# Patient Record
Sex: MALE | Race: WHITE | NOT HISPANIC OR LATINO | Employment: FULL TIME | URBAN - METROPOLITAN AREA
[De-identification: names, ages, dates, MRNs, and addresses within clinical notes are randomized per-mention and may not be internally consistent; named-entity substitution may affect disease eponyms.]

---

## 2017-03-02 ENCOUNTER — TELEPHONE (OUTPATIENT)
Dept: FAMILY MEDICINE | Facility: CLINIC | Age: 52
End: 2017-03-02

## 2017-03-02 NOTE — TELEPHONE ENCOUNTER
Call Regarding Preventive Health Screening Colonoscopy    Attempt 1    Message     Comments: Per patient states he doesn't have the money for it yet, will call back      Outreach   Yanique Velazquez

## 2017-11-01 ENCOUNTER — TELEPHONE (OUTPATIENT)
Dept: FAMILY MEDICINE | Facility: CLINIC | Age: 52
End: 2017-11-01

## 2017-11-01 DIAGNOSIS — F41.1 GAD (GENERALIZED ANXIETY DISORDER): ICD-10-CM

## 2017-11-01 NOTE — TELEPHONE ENCOUNTER
Routing refill request to provider for review/approval because:  Felipa given x1 and patient did not follow up, please advise  A break in medication  Patient needs to be seen because it has been more than 1 year since last office visit.

## 2017-11-02 RX ORDER — ESCITALOPRAM OXALATE 10 MG/1
TABLET ORAL
Qty: 30 TABLET | Refills: 0 | Status: SHIPPED | OUTPATIENT
Start: 2017-11-02 | End: 2017-11-30

## 2017-11-30 ENCOUNTER — OFFICE VISIT (OUTPATIENT)
Dept: FAMILY MEDICINE | Facility: CLINIC | Age: 52
End: 2017-11-30
Payer: COMMERCIAL

## 2017-11-30 VITALS
RESPIRATION RATE: 16 BRPM | TEMPERATURE: 98 F | WEIGHT: 224 LBS | SYSTOLIC BLOOD PRESSURE: 127 MMHG | OXYGEN SATURATION: 96 % | HEART RATE: 74 BPM | HEIGHT: 70 IN | BODY MASS INDEX: 32.07 KG/M2 | DIASTOLIC BLOOD PRESSURE: 78 MMHG

## 2017-11-30 DIAGNOSIS — Z00.01 ENCOUNTER FOR ROUTINE ADULT MEDICAL EXAM WITH ABNORMAL FINDINGS: Primary | ICD-10-CM

## 2017-11-30 DIAGNOSIS — Z12.11 SPECIAL SCREENING FOR MALIGNANT NEOPLASMS, COLON: ICD-10-CM

## 2017-11-30 DIAGNOSIS — F41.1 GAD (GENERALIZED ANXIETY DISORDER): ICD-10-CM

## 2017-11-30 DIAGNOSIS — E78.5 HYPERLIPIDEMIA LDL GOAL <160: ICD-10-CM

## 2017-11-30 DIAGNOSIS — Z12.5 SPECIAL SCREENING FOR MALIGNANT NEOPLASM OF PROSTATE: ICD-10-CM

## 2017-11-30 LAB
CHOLEST SERPL-MCNC: 231 MG/DL
HDLC SERPL-MCNC: 52 MG/DL
LDLC SERPL CALC-MCNC: 146 MG/DL
NONHDLC SERPL-MCNC: 179 MG/DL
TRIGL SERPL-MCNC: 164 MG/DL

## 2017-11-30 PROCEDURE — G0103 PSA SCREENING: HCPCS | Performed by: PHYSICIAN ASSISTANT

## 2017-11-30 PROCEDURE — 99396 PREV VISIT EST AGE 40-64: CPT | Performed by: PHYSICIAN ASSISTANT

## 2017-11-30 PROCEDURE — 36415 COLL VENOUS BLD VENIPUNCTURE: CPT | Performed by: PHYSICIAN ASSISTANT

## 2017-11-30 PROCEDURE — 80061 LIPID PANEL: CPT | Performed by: PHYSICIAN ASSISTANT

## 2017-11-30 RX ORDER — ESCITALOPRAM OXALATE 10 MG/1
10 TABLET ORAL DAILY
Qty: 90 TABLET | Refills: 1 | Status: SHIPPED | OUTPATIENT
Start: 2017-11-30 | End: 2018-06-11

## 2017-11-30 RX ORDER — ATORVASTATIN CALCIUM 10 MG/1
10 TABLET, FILM COATED ORAL DAILY
Qty: 90 TABLET | Refills: 1 | Status: CANCELLED | OUTPATIENT
Start: 2017-11-30

## 2017-11-30 NOTE — LETTER
December 19, 2017      Harjit Medina  [address]  [facility] [address]        Dear ,    We are writing to inform you of your test results.    Your recent psa came back nice and normal. Your cholesterol numbers are still elevated. As discussed at your appt , start taking low dose lipitor. Also , work on a Lower fat, higher fiber diet and consistent exercise. I'd like your cholesterol numbers rechecked in 2-3 mos.    Resulted Orders   Lipid panel reflex to direct LDL Fasting   Result Value Ref Range    Cholesterol 231 (H) <200 mg/dL      Comment:      Desirable:       <200 mg/dl    Triglycerides 164 (H) <150 mg/dL      Comment:      Borderline high:  150-199 mg/dl  High:             200-499 mg/dl  Very high:       >499 mg/dl  Fasting specimen      HDL Cholesterol 52 >39 mg/dL    LDL Cholesterol Calculated 146 (H) <100 mg/dL      Comment:      Above desirable:  100-129 mg/dl  Borderline High:  130-159 mg/dL  High:             160-189 mg/dL  Very high:       >189 mg/dl      Non HDL Cholesterol 179 (H) <130 mg/dL      Comment:      Above Desirable:  130-159 mg/dl  Borderline high:  160-189 mg/dl  High:             190-219 mg/dl  Very high:       >219 mg/dl     PSA, screen   Result Value Ref Range    PSA 0.88 0 - 4 ug/L      Comment:      Assay Method:  Chemiluminescence using Siemens Vista analyzer       If you have any questions or concerns, please call the clinic at [phone].       Sincerely,    Piter Maynard PA-C/char

## 2017-11-30 NOTE — PROGRESS NOTES
SUBJECTIVE:   CC: Harjit Medina is an 52 year old male who presents for preventative health visit.     Healthy Habits:    Do you get at least three servings of calcium containing foods daily (dairy, green leafy vegetables, etc.)? yes    Amount of exercise or daily activities, outside of work: 6 day(s) per week    Problems taking medications regularly Yes never picked up lipid medication    Medication side effects: No    Have you had an eye exam in the past two years? yes    Do you see a dentist twice per year? yes    Do you have sleep apnea, excessive snoring or daytime drowsiness?no          Hyperlipidemia Follow-Up      Rate your low fat/cholesterol diet?: good    Taking statin?  Never picked up lipid medication last year    Other lipid medications/supplements?:  none      Never took atorvastatin. Will see what his cholesterol looks like today. He's been watching his diet and eating healthier. Working out routinely.     Today's PHQ-2 Score: PHQ-2 ( 1999 [employer]) 11/30/2017 11/5/2015   Q1: Little interest or pleasure in doing things 1 0   Q2: Feeling down, depressed or hopeless 1 0   PHQ-2 Score 2 0         Abuse: Current or Past(Physical, Sexual or Emotional)- No  Do you feel safe in your environment - Yes  Social History   Substance Use Topics     Smoking status: Current Every Day Smoker     Types: Cigarettes     Smokeless tobacco: Not on file     Alcohol use Yes     The patient does not drink >3 drinks per day nor >7 drinks per week.    Last PSA:   PSA   Date Value Ref Range Status   11/14/2016 0.68 0 - 4 ug/L Final     Comment:     Assay Method:  Chemiluminescence using Siemens Vista analyzer       Reviewed orders with patient. Reviewed health maintenance and updated orders accordingly - Yes      Reviewed and updated as needed this visit by clinical staff  Tobacco  Allergies  Meds         Reviewed and updated as needed this visit by Provider              ROS:  C: NEGATIVE for fever, chills, change in  "weight  I: NEGATIVE for worrisome rashes, moles or lesions  E: NEGATIVE for vision changes or irritation  ENT: NEGATIVE for ear, mouth and throat problems  R: NEGATIVE for significant cough or SOB  CV: NEGATIVE for chest pain, palpitations or peripheral edema  GI: NEGATIVE for nausea, abdominal pain, heartburn, or change in bowel habits   male: negative for dysuria, hematuria, decreased urinary stream, erectile dysfunction, urethral discharge  M: NEGATIVE for significant arthralgias or myalgia  N: NEGATIVE for weakness, dizziness or paresthesias  P: NEGATIVE for changes in mood or affect    OBJECTIVE:   /78  Pulse 74  Temp 98  F (36.7  C) (Tympanic)  Resp 16  Ht 5' 10\" (1.778 m)  Wt 224 lb (101.6 kg)  SpO2 96%  BMI 32.14 kg/m2  EXAM:  GENERAL: healthy, alert and no distress  EYES: Eyes grossly normal to inspection, PERRL and conjunctivae and sclerae normal  HENT: ear canals and TM's normal, nose and mouth without ulcers or lesions  NECK: no adenopathy, no asymmetry, masses, or scars and thyroid normal to palpation  RESP: lungs clear to auscultation - no rales, rhonchi or wheezes  CV: regular rate and rhythm, normal S1 S2, no S3 or S4, no murmur, click or rub, no peripheral edema and peripheral pulses strong  ABDOMEN: soft, nontender, no hepatosplenomegaly, no masses and bowel sounds normal  MS: no gross musculoskeletal defects noted, no edema  SKIN: no suspicious lesions or rashes  NEURO: Normal strength and tone, mentation intact and speech normal  PSYCH: mentation appears normal, affect normal/bright    ASSESSMENT/PLAN:       ICD-10-CM    1. Encounter for routine adult medical exam with abnormal findings Z00.01 Basic metabolic panel  (Ca, Cl, CO2, Creat, Gluc, K, Na, BUN)     TSH   2. Special screening for malignant neoplasms, colon Z12.11 GASTROENTEROLOGY ADULT REF PROCEDURE ONLY   3. Hyperlipidemia LDL goal <160 E78.5 Lipid panel reflex to direct LDL Fasting   4. NAYELY (generalized anxiety disorder) " "F41.1 escitalopram (LEXAPRO) 10 MG tablet   5. Special screening for malignant neoplasm of prostate Z12.5 PSA, screen       COUNSELING:  Reviewed preventive health counseling, as reflected in patient instructions       Regular exercise       Healthy diet/nutrition         reports that he has been smoking Cigarettes.  He does not have any smokeless tobacco history on file.  Tobacco Cessation Action Plan: pt to work on quitting again  Estimated body mass index is 32.14 kg/(m^2) as calculated from the following:    Height as of this encounter: 5' 10\" (1.778 m).    Weight as of this encounter: 224 lb (101.6 kg).         Counseling Resources:  ATP IV Guidelines  Pooled Cohorts Equation Calculator  FRAX Risk Assessment  ICSI Preventive Guidelines  Dietary Guidelines for Americans, 2010  USDA's MyPlate  ASA Prophylaxis  Lung CA Screening    Piter Maynard PA-C  [facility] [place]  "

## 2017-11-30 NOTE — MR AVS SNAPSHOT
After Visit Summary   11/30/2017    Harjit Medina    MRN: [id number]           Patient Information     Date Of Birth          1965        Visit Information        Provider Department      11/30/2017 9:00 AM Piter Maynard PA-C [facility]        Today's Diagnoses     Encounter for routine adult medical exam with abnormal findings    -  1    Special screening for malignant neoplasms, colon        Hyperlipidemia LDL goal <160        NAYELY (generalized anxiety disorder)        Special screening for malignant neoplasm of prostate          Care Instructions      Preventive Health Recommendations  Male Ages 50 - 64    Yearly exam:             See your health care provider every year in order to  o   Review health changes.   o   Discuss preventive care.    o   Review your medicines if your doctor has prescribed any.     Have a cholesterol test every 5 years, or more frequently if you are at risk for high cholesterol/heart disease.     Have a diabetes test (fasting glucose) every three years. If you are at risk for diabetes, you should have this test more often.     Have a colonoscopy at age 50, or have a yearly FIT test (stool test). These exams will check for colon cancer.      Talk with your health care provider about whether or not a prostate cancer screening test (PSA) is right for you.    You should be tested each year for STDs (sexually transmitted diseases), if you re at risk.     Shots: Get a flu shot each year. Get a tetanus shot every 10 years.     Nutrition:    Eat at least 5 servings of fruits and vegetables daily.     Eat whole-grain bread, whole-wheat pasta and brown rice instead of white grains and rice.     Talk to your provider about Calcium and Vitamin D.     Lifestyle    Exercise for at least 150 minutes a week (30 minutes a day, 5 days a week). This will help you control your weight and prevent disease.     Limit alcohol to one drink per day.     No smoking.      Wear sunscreen to prevent skin cancer.     See your dentist every six months for an exam and cleaning.     See your eye doctor every 1 to 2 years.            Follow-ups after your visit        Additional Services     GASTROENTEROLOGY ADULT REF PROCEDURE ONLY       Last Lab Result: Creatinine (mg/dL)       Date                     Value                 11/14/2016               1.00             ----------  There is no height or weight on file to calculate BMI.     Needed:  No  Language:  English    Patient will be contacted to schedule procedure.     Please be aware that coverage of these services is subject to the terms and limitations of your health insurance plan.  Call member services at your health plan with any benefit or coverage questions.  Any procedures must be performed at a [facility] facility OR coordinated by your clinic's referral office.    Please bring the following with you to your appointment:    (1) Any X-Rays, CTs or MRIs which have been performed.  Contact the facility where they were done to arrange for  prior to your scheduled appointment.    (2) List of current medications   (3) This referral request   (4) Any documents/labs given to you for this referral                  Who to contact     Normal or non-critical lab and imaging results will be communicated to you by MyChart, letter or phone within 4 business days after the clinic has received the results. If you do not hear from us within 7 days, please contact the clinic through [employer] or phone. If you have a critical or abnormal lab result, we will notify you by phone as soon as possible.  Submit refill requests through [employer] or call your pharmacy and they will forward the refill request to us. Please allow 3 business days for your refill to be completed.          If you need to speak with a  for additional information , please call: [phone]             Additional Information About Your Visit    "     [employer] Information     [employer] lets you send messages to your doctor, view your test results, renew your prescriptions, schedule appointments and more. To sign up, go to [email or web address] . Click on \"Log in\" on the left side of the screen, which will take you to the Welcome page. Then click on \"Sign up Now\" on the right side of the page.     You will be asked to enter the access code listed below, as well as some personal information. Please follow the directions to create your username and password.     Your access code is: [email or web address]  Expires: 2018  9:28 AM     Your access code will  in 90 days. If you need help or a new code, please call your [facility] clinic or [phone].        Care EveryWhere ID     This is your Care EveryWhere ID. This could be used by other organizations to access your [facility] medical records  [id number]        Your Vitals Were     Pulse Temperature Respirations Height Pulse Oximetry BMI (Body Mass Index)    74 98  F (36.7  C) (Tympanic) 16 5' 10\" (1.778 m) 96% 32.14 kg/m2       Blood Pressure from Last 3 Encounters:   17 127/78   16 118/72   16 125/86    Weight from Last 3 Encounters:   17 224 lb (101.6 kg)   16 233 lb (105.7 kg)   11/05/15 228 lb (103.4 kg)              We Performed the Following     Eye Exam - HIM Scan     GASTROENTEROLOGY ADULT REF PROCEDURE ONLY     Lipid panel reflex to direct LDL Fasting     PSA, screen          Today's Medication Changes          These changes are accurate as of: 17  9:28 AM.  If you have any questions, ask your nurse or doctor.               These medicines have changed or have updated prescriptions.        Dose/Directions    escitalopram 10 MG tablet   Commonly known as:  LEXAPRO   This may have changed:    - how much to take  - how to take this  - when to take this  - additional instructions   Used for:  NAYELY (generalized anxiety disorder)   Changed by:  Piter Maynard PA-C     "    Dose:  10 mg   Take 1 tablet (10 mg) by mouth daily 1 tablet daily   Quantity:  90 tablet   Refills:  1            Where to get your medicines      These medications were sent to [id number] - [place], [place] - [address] AT [facility] [address])  [address], [place] [address]     Phone:  [phone]     escitalopram 10 MG tablet                Primary Care Provider Office Phone # Fax #    Piter Maynard PA-C [phone]       [address]  [place] [address]        Equal Access to Services     [facility]: Hadii aad ku hadasho Soomaali, waaxda luqadaha, qaybta kaalmada adeegyada, waxlaly annin hayaan adedimitrios meléndez . So [facility] [phone].    ATENCIÓN: Si habla español, tiene a villanueva disposición servicios gratuitos de asistencia lingüística. [facility] [phone].    We comply with applicable federal civil rights laws and [place] laws. We do not discriminate on the basis of race, color, national origin, age, disability, sex, sexual orientation, or gender identity.            Thank you!     Thank you for choosing [facility]  for your care. Our goal is always to provide you with excellent care. Hearing back from our patients is one way we can continue to improve our services. Please take a few minutes to complete the written survey that you may receive in the mail after your visit with us. Thank you!             Your Updated Medication List - Protect others around you: Learn how to safely use, store and throw away your medicines at www.disposemymeds.org.          This list is accurate as of: 11/30/17  9:28 AM.  Always use your most recent med list.                   Brand Name Dispense Instructions for use Diagnosis    atorvastatin 10 MG tablet    LIPITOR    90 tablet    Take 1 tablet (10 mg) by mouth daily    Hyperlipidemia LDL goal <160       escitalopram 10 MG tablet    LEXAPRO    90 tablet    Take 1 tablet (10 mg) by mouth  daily 1 tablet daily    NAYELY (generalized anxiety disorder)       order for DME     1 Device    Equipment being ordered: CPAP and all necessary supplies    BARRINGTON (obstructive sleep apnea)

## 2017-12-01 LAB — PSA SERPL-ACNC: 0.88 UG/L (ref 0–4)

## 2017-12-04 ENCOUNTER — HOSPITAL ENCOUNTER (OUTPATIENT)
Facility: AMBULATORY SURGERY CENTER | Age: 52
End: 2017-12-04
Attending: SURGERY | Admitting: SURGERY
Payer: COMMERCIAL

## 2017-12-28 VITALS — WEIGHT: 223.99 LBS | HEIGHT: 70 IN | BODY MASS INDEX: 32.07 KG/M2

## 2017-12-28 RX ORDER — LIDOCAINE 40 MG/G
CREAM TOPICAL
Status: CANCELLED | OUTPATIENT
Start: 2017-12-28

## 2018-01-04 ENCOUNTER — SURGERY (OUTPATIENT)
Age: 53
End: 2018-01-04

## 2018-01-04 ENCOUNTER — HOSPITAL ENCOUNTER (OUTPATIENT)
Facility: AMBULATORY SURGERY CENTER | Age: 53
Discharge: HOME | End: 2018-01-04
Attending: SURGERY | Admitting: SURGERY
Payer: COMMERCIAL

## 2018-01-04 VITALS
RESPIRATION RATE: 16 BRPM | OXYGEN SATURATION: 96 % | TEMPERATURE: 97.4 F | DIASTOLIC BLOOD PRESSURE: 79 MMHG | SYSTOLIC BLOOD PRESSURE: 116 MMHG

## 2018-01-04 PROCEDURE — G8907 PT DOC NO EVENTS ON DISCHARG: HCPCS

## 2018-01-04 PROCEDURE — 99152 MOD SED SAME PHYS/QHP 5/>YRS: CPT | Performed by: SURGERY

## 2018-01-04 PROCEDURE — 45380 COLONOSCOPY AND BIOPSY: CPT

## 2018-01-04 PROCEDURE — G8918 PT W/O PREOP ORDER IV AB PRO: HCPCS

## 2018-01-04 PROCEDURE — 88305 TISSUE EXAM BY PATHOLOGIST: CPT | Performed by: SURGERY

## 2018-01-04 PROCEDURE — 45380 COLONOSCOPY AND BIOPSY: CPT | Mod: PT | Performed by: SURGERY

## 2018-01-04 RX ORDER — FENTANYL CITRATE 50 UG/ML
INJECTION, SOLUTION INTRAMUSCULAR; INTRAVENOUS PRN
Status: DISCONTINUED | OUTPATIENT
Start: 2018-01-04 | End: 2018-01-04 | Stop reason: HOSPADM

## 2018-01-04 RX ORDER — LIDOCAINE 40 MG/G
CREAM TOPICAL
Status: DISCONTINUED | OUTPATIENT
Start: 2018-01-04 | End: 2018-01-05 | Stop reason: HOSPADM

## 2018-01-04 RX ADMIN — FENTANYL CITRATE 50 MCG: 50 INJECTION, SOLUTION INTRAMUSCULAR; INTRAVENOUS at 08:29

## 2018-01-04 RX ADMIN — FENTANYL CITRATE 100 MCG: 50 INJECTION, SOLUTION INTRAMUSCULAR; INTRAVENOUS at 08:26

## 2018-01-04 RX ADMIN — FENTANYL CITRATE 50 MCG: 50 INJECTION, SOLUTION INTRAMUSCULAR; INTRAVENOUS at 08:35

## 2018-01-08 LAB — COPATH REPORT: NORMAL

## 2018-01-15 LAB — COLONOSCOPY: NORMAL

## 2018-02-19 DIAGNOSIS — F41.1 GAD (GENERALIZED ANXIETY DISORDER): ICD-10-CM

## 2018-02-19 RX ORDER — ESCITALOPRAM OXALATE 10 MG/1
TABLET ORAL
Qty: 30 TABLET | Refills: 0 | OUTPATIENT
Start: 2018-02-19

## 2018-02-19 NOTE — TELEPHONE ENCOUNTER
"Requested Prescriptions   Pending Prescriptions Disp Refills     escitalopram (LEXAPRO) 10 MG tablet [Pharmacy Med Name: ESCITALOPRAM 10MG TABLETS] 30 tablet 0    Last Written Prescription Date:  11/2/17  Last Fill Quantity: 30,  # refills: 0   Last office visit: 11/30/2017 with prescribing provider:  11/30/17   Future Office Visit:   Sig: TAKE ONE-HALF TABLET BY MOUTH DAILY FOR 1 WEEK, THEN INCREASE TO 1 TABLET DAILY THEREAFTER    SSRIs Protocol Passed    2/19/2018  1:40 PM       Passed - Recent or future visit with authorizing provider    Patient had office visit in the last year or has a visit in the next 30 days with authorizing provider.  See \"Patient Info\" tab in inbasket, or \"Choose Columns\" in Meds & Orders section of the refill encounter.            Passed - Patient is age 18 or older        "

## 2018-02-20 NOTE — TELEPHONE ENCOUNTER
"Call to [facility] to discuss our records ,yes she see refill , she apologized \"not sure how that happened \"  "

## 2018-04-26 ENCOUNTER — TELEPHONE (OUTPATIENT)
Dept: FAMILY MEDICINE | Facility: CLINIC | Age: 53
End: 2018-04-26

## 2018-04-26 DIAGNOSIS — K57.32 DIVERTICULITIS OF COLON: ICD-10-CM

## 2018-04-26 RX ORDER — CIPROFLOXACIN 500 MG/1
500 TABLET, FILM COATED ORAL 2 TIMES DAILY
Qty: 28 TABLET | Refills: 0 | Status: SHIPPED | OUTPATIENT
Start: 2018-04-26 | End: 2019-05-01

## 2018-04-26 NOTE — TELEPHONE ENCOUNTER
Spoke with patient and he reports you have seen him for this in the past, (9/2016 OV)  he is asking if you would send in medication for this problem.  The symptoms of cramping and irregular bowel movement started last night.  Pharmacy pended,

## 2018-04-26 NOTE — TELEPHONE ENCOUNTER
Patient notified and voiced understanding and agreement.  Knows to be seen if symptoms change, worsen or persist.  Anel Ramirez RN

## 2018-06-11 DIAGNOSIS — E78.5 HYPERLIPIDEMIA LDL GOAL <160: Primary | ICD-10-CM

## 2018-06-11 DIAGNOSIS — F41.1 GAD (GENERALIZED ANXIETY DISORDER): ICD-10-CM

## 2018-06-11 NOTE — TELEPHONE ENCOUNTER
"Requested Prescriptions   Pending Prescriptions Disp Refills     escitalopram (LEXAPRO) 10 MG tablet [Pharmacy Med Name: ESCITALOPRAM 10MG TABLETS] 90 tablet 0    Last Written Prescription Date:  2-19-18  Last Fill Quantity: 90,  # refills: 0   Last office visit: 11/30/2017 with prescribing provider:  11-30-17   Future Office Visit:   Sig: TAKE 1 TABLET(10 MG) BY MOUTH DAILY    SSRIs Protocol Passed    6/11/2018  8:18 AM       Passed - Recent (12 mo) or future (30 days) visit within the authorizing provider's specialty    Patient had office visit in the last 12 months or has a visit in the next 30 days with authorizing provider or within the authorizing provider's specialty.  See \"Patient Info\" tab in inbasket, or \"Choose Columns\" in Meds & Orders section of the refill encounter.           Passed - Patient is age 18 or older        "

## 2018-06-11 NOTE — LETTER
[facility]  [address] [facility] [address]  Phone: [phone]        June 13, 2018      Harjit DONNA Medina                                                                                                             [address] [facility] [address]        Dear Mr. Medina,    We are concerned about your health care. We recently provided you with a medication refill. Many medications require routine follow-up with your Doctor.      At this time we ask that: You schedule a routine office visit with your physician to follow your Anxiety.    Your prescription: Has been refilled for 1 month so you may have time for the above noted follow-up.      Thank you,    Piter Maynard's Care Team

## 2018-06-12 RX ORDER — ESCITALOPRAM OXALATE 10 MG/1
TABLET ORAL
Qty: 30 TABLET | Refills: 0 | Status: SHIPPED | OUTPATIENT
Start: 2018-06-12 | End: 2018-07-11

## 2018-06-12 NOTE — TELEPHONE ENCOUNTER
Routing refill request to provider for review/approval because:  No flowsheet data found.    Also overdue for recheck fasting lipids. Call to schedule lab appt. And complete NAYELY-7    Lab Results   Component Value Date    CHOL 231 11/30/2017     Lab Results   Component Value Date    HDL 52 11/30/2017     Lab Results   Component Value Date     11/30/2017     Lab Results   Component Value Date    TRIG 164 11/30/2017     Lab Results   Component Value Date    CHOLHDLRATIO 5.4 11/05/2015

## 2018-07-11 DIAGNOSIS — F41.1 GAD (GENERALIZED ANXIETY DISORDER): ICD-10-CM

## 2018-07-11 NOTE — TELEPHONE ENCOUNTER
"Requested Prescriptions   Pending Prescriptions Disp Refills     escitalopram (LEXAPRO) 10 MG tablet [Pharmacy Med Name: ESCITALOPRAM 10MG TABLETS] 30 tablet 0    Last Written Prescription Date:  6-12-18  Last Fill Quantity: 30,  # refills: 0   Last office visit: 11/30/2017 with prescribing provider:  11-30-17   Future Office Visit:   Sig: TAKE 1 TABLET(10 MG) BY MOUTH DAILY    SSRIs Protocol Passed    7/11/2018 10:22 AM       Passed - Recent (12 mo) or future (30 days) visit within the authorizing provider's specialty    Patient had office visit in the last 12 months or has a visit in the next 30 days with authorizing provider or within the authorizing provider's specialty.  See \"Patient Info\" tab in inbasket, or \"Choose Columns\" in Meds & Orders section of the refill encounter.           Passed - Patient is age 18 or older        "

## 2018-07-11 NOTE — TELEPHONE ENCOUNTER
Routing refill request to provider for review/approval because:  Labs not current:  No isaias 7 listed, please call to get info.   No flowsheet data found.

## 2018-07-12 RX ORDER — ESCITALOPRAM OXALATE 10 MG/1
TABLET ORAL
Qty: 90 TABLET | Refills: 0 | Status: SHIPPED | OUTPATIENT
Start: 2018-07-12 | End: 2018-10-20

## 2018-07-12 ASSESSMENT — ANXIETY QUESTIONNAIRES
3. WORRYING TOO MUCH ABOUT DIFFERENT THINGS: SEVERAL DAYS
5. BEING SO RESTLESS THAT IT IS HARD TO SIT STILL: SEVERAL DAYS
7. FEELING AFRAID AS IF SOMETHING AWFUL MIGHT HAPPEN: NOT AT ALL
1. FEELING NERVOUS, ANXIOUS, OR ON EDGE: NOT AT ALL
GAD7 TOTAL SCORE: 6
2. NOT BEING ABLE TO STOP OR CONTROL WORRYING: MORE THAN HALF THE DAYS
6. BECOMING EASILY ANNOYED OR IRRITABLE: MORE THAN HALF THE DAYS

## 2018-07-12 ASSESSMENT — PATIENT HEALTH QUESTIONNAIRE - PHQ9: 5. POOR APPETITE OR OVEREATING: NOT AT ALL

## 2018-07-13 ASSESSMENT — ANXIETY QUESTIONNAIRES: GAD7 TOTAL SCORE: 6

## 2018-10-20 DIAGNOSIS — F41.1 GAD (GENERALIZED ANXIETY DISORDER): ICD-10-CM

## 2018-10-20 NOTE — LETTER
October 24, 2018       Harjit Medina                                                                                                              [address] [facility] [address]        Dear Mr. Medina,    We are concerned about your health care. We recently provided you with a medication refill. Many medications require routine follow-up with your Doctor.      At this time we ask that: You schedule a routine office visit with your physician to follow your Anxiety.    Your prescription: Has been refilled for 1 month so you may have time for the above noted follow-up.      Thank you,    Piter Maynard's Care Team

## 2018-10-22 NOTE — TELEPHONE ENCOUNTER
Routing refill request to provider for review/approval because:  Patient needs to be seen because:  Patient advised to be seen and no showed appoinment

## 2018-10-22 NOTE — TELEPHONE ENCOUNTER
"Requested Prescriptions   Pending Prescriptions Disp Refills     escitalopram (LEXAPRO) 10 MG tablet [Pharmacy Med Name: ESCITALOPRAM 10MG TABLETS] 90 tablet 0    Last Written Prescription Date:  07/26/18  Last Fill Quantity: 90,  # refills: 0   Last office visit: 11/30/2017 with prescribing provider:  TERRANCE Maynard  Future Office Visit:     Sig: TAKE 1 TABLET(10 MG) BY MOUTH DAILY    SSRIs Protocol Failed    10/20/2018 10:17 AM   NAYELY-7 SCORE 7/12/2018   Total Score 6     No flowsheet data found.      Failed - Recent (12 mo) or future (30 days) visit within the authorizing provider's specialty    Patient had office visit in the last 12 months or has a visit in the next 30 days with authorizing provider or within the authorizing provider's specialty.  See \"Patient Info\" tab in inbasket, or \"Choose Columns\" in Meds & Orders section of the refill encounter.             Passed - Patient is age 18 or older          "

## 2018-10-23 RX ORDER — ESCITALOPRAM OXALATE 10 MG/1
TABLET ORAL
Qty: 30 TABLET | Refills: 0 | Status: SHIPPED | OUTPATIENT
Start: 2018-10-23 | End: 2019-05-01

## 2019-05-01 ENCOUNTER — OFFICE VISIT (OUTPATIENT)
Dept: FAMILY MEDICINE | Facility: CLINIC | Age: 54
End: 2019-05-01
Payer: COMMERCIAL

## 2019-05-01 VITALS
HEART RATE: 68 BPM | OXYGEN SATURATION: 99 % | BODY MASS INDEX: 35.07 KG/M2 | DIASTOLIC BLOOD PRESSURE: 84 MMHG | TEMPERATURE: 97.8 F | SYSTOLIC BLOOD PRESSURE: 128 MMHG | RESPIRATION RATE: 16 BRPM | HEIGHT: 70 IN | WEIGHT: 245 LBS

## 2019-05-01 DIAGNOSIS — M76.62 ACHILLES TENDINITIS, LEFT LEG: ICD-10-CM

## 2019-05-01 DIAGNOSIS — H91.90 PERCEIVED HEARING CHANGES: ICD-10-CM

## 2019-05-01 DIAGNOSIS — F41.1 GAD (GENERALIZED ANXIETY DISORDER): ICD-10-CM

## 2019-05-01 DIAGNOSIS — Z13.1 SCREENING FOR DIABETES MELLITUS: ICD-10-CM

## 2019-05-01 DIAGNOSIS — Z00.00 ROUTINE GENERAL MEDICAL EXAMINATION AT A HEALTH CARE FACILITY: Primary | ICD-10-CM

## 2019-05-01 DIAGNOSIS — E66.01 MORBID OBESITY (H): ICD-10-CM

## 2019-05-01 DIAGNOSIS — E78.5 HYPERLIPIDEMIA LDL GOAL <160: ICD-10-CM

## 2019-05-01 DIAGNOSIS — Z12.5 SCREENING PSA (PROSTATE SPECIFIC ANTIGEN): ICD-10-CM

## 2019-05-01 LAB
ANION GAP SERPL CALCULATED.3IONS-SCNC: 9 MMOL/L (ref 3–14)
BUN SERPL-MCNC: 14 MG/DL (ref 7–30)
CALCIUM SERPL-MCNC: 9.2 MG/DL (ref 8.5–10.1)
CHLORIDE SERPL-SCNC: 103 MMOL/L (ref 94–109)
CHOLEST SERPL-MCNC: 214 MG/DL
CO2 SERPL-SCNC: 25 MMOL/L (ref 20–32)
CREAT SERPL-MCNC: 0.91 MG/DL (ref 0.66–1.25)
GFR SERPL CREATININE-BSD FRML MDRD: >90 ML/MIN/{1.73_M2}
GLUCOSE SERPL-MCNC: 88 MG/DL (ref 70–99)
HDLC SERPL-MCNC: 44 MG/DL
LDLC SERPL CALC-MCNC: 119 MG/DL
NONHDLC SERPL-MCNC: 170 MG/DL
POTASSIUM SERPL-SCNC: 4.7 MMOL/L (ref 3.4–5.3)
PSA SERPL-ACNC: 0.68 UG/L (ref 0–4)
SODIUM SERPL-SCNC: 137 MMOL/L (ref 133–144)
TRIGL SERPL-MCNC: 254 MG/DL

## 2019-05-01 PROCEDURE — 99396 PREV VISIT EST AGE 40-64: CPT | Performed by: PHYSICIAN ASSISTANT

## 2019-05-01 PROCEDURE — 80061 LIPID PANEL: CPT | Performed by: PHYSICIAN ASSISTANT

## 2019-05-01 PROCEDURE — G0103 PSA SCREENING: HCPCS | Performed by: PHYSICIAN ASSISTANT

## 2019-05-01 PROCEDURE — 36415 COLL VENOUS BLD VENIPUNCTURE: CPT | Performed by: PHYSICIAN ASSISTANT

## 2019-05-01 PROCEDURE — 80048 BASIC METABOLIC PNL TOTAL CA: CPT | Performed by: PHYSICIAN ASSISTANT

## 2019-05-01 PROCEDURE — 99213 OFFICE O/P EST LOW 20 MIN: CPT | Mod: 25 | Performed by: PHYSICIAN ASSISTANT

## 2019-05-01 RX ORDER — ESCITALOPRAM OXALATE 10 MG/1
10 TABLET ORAL DAILY
Qty: 90 TABLET | Refills: 1 | Status: SHIPPED | OUTPATIENT
Start: 2019-05-01 | End: 2019-05-01

## 2019-05-01 ASSESSMENT — MIFFLIN-ST. JEOR: SCORE: 1954.62

## 2019-05-01 NOTE — PROGRESS NOTES
SUBJECTIVE:   CC: Harjit Medina is an 53 year old male who presents for preventive health visit.     Healthy Habits:    Do you get at least three servings of calcium containing foods daily (dairy, green leafy vegetables, etc.)? yes    Amount of exercise or daily activities, outside of work: 0 day(s) per week    Problems taking medications regularly No    Medication side effects: No    Have you had an eye exam in the past two years? yes    Do you see a dentist twice per year? yes    Do you have sleep apnea, excessive snoring or daytime drowsiness?yes      Depression and Anxiety Follow-Up    Status since last visit: stable refill medication today    Other associated symptoms:None    Complicating factors:     Significant life event: No     Current substance abuse: None  Doing well.   Still noting some irritability.   Noting some hearing changes.       No flowsheet data found.  NAYELY-7 SCORE 7/12/2018   Total Score 6       PHQ-9  English  PHQ-9   Any Language  NAYELY-7  Suicide Assessment Five-step Evaluation and Treatment (SAFE-T)    Today's PHQ-2 Score:   PHQ-2 ( 1999 [employer]) 11/30/2017 11/5/2015   Q1: Little interest or pleasure in doing things 1 0   Q2: Feeling down, depressed or hopeless 1 0   PHQ-2 Score 2 0       Abuse: Current or Past(Physical, Sexual or Emotional)- No  Do you feel safe in your environment? Yes    Social History     Tobacco Use     Smoking status: Former Smoker     Packs/day: 1.00     Types: Cigarettes     Smokeless tobacco: Former User     Quit date: 9/1/2018   Substance Use Topics     Alcohol use: Yes     Comment: couple days per week     If you drink alcohol do you typically have >3 drinks per day or >7 drinks per week? No                      Last PSA:   PSA   Date Value Ref Range Status   11/30/2017 0.88 0 - 4 ug/L Final     Comment:     Assay Method:  Chemiluminescence using Siemens Vista analyzer       Reviewed orders with patient. Reviewed health maintenance and updated orders  "accordingly - Yes      Reviewed and updated as needed this visit by clinical staff  Tobacco  Allergies  Meds  Med Hx  Surg Hx  Fam Hx  Soc Hx        Reviewed and updated as needed this visit by Provider            ROS:  CONSTITUTIONAL: NEGATIVE for fever, chills, change in weight  INTEGUMENTARY/SKIN: NEGATIVE for worrisome rashes, moles or lesions  EYES: NEGATIVE for vision changes or irritation  ENT: NEGATIVE for ear, mouth and throat problems  RESP: NEGATIVE for significant cough or SOB  CV: NEGATIVE for chest pain, palpitations or peripheral edema  GI: NEGATIVE for nausea, abdominal pain, heartburn, or change in bowel habits   male: negative for dysuria, hematuria, decreased urinary stream, erectile dysfunction, urethral discharge  MUSCULOSKELETAL: NEGATIVE for significant arthralgias or myalgia  NEURO: NEGATIVE for weakness, dizziness or paresthesias  PSYCHIATRIC: NEGATIVE for changes in mood or affect    OBJECTIVE:   /84   Pulse 68   Temp 97.8  F (36.6  C) (Tympanic)   Resp 16   Ht 1.765 m (5' 9.5\")   Wt 111.1 kg (245 lb)   SpO2 99%   BMI 35.66 kg/m    EXAM:  GENERAL: healthy, alert and no distress  EYES: Eyes grossly normal to inspection, PERRL and conjunctivae and sclerae normal  HENT: ear canals and TM's normal, nose and mouth without ulcers or lesions  NECK: no adenopathy, no asymmetry, masses, or scars and thyroid normal to palpation  RESP: lungs clear to auscultation - no rales, rhonchi or wheezes  CV: regular rate and rhythm, normal S1 S2, no S3 or S4, no murmur, click or rub, no peripheral edema and peripheral pulses strong  ABDOMEN: soft, nontender, no hepatosplenomegaly, no masses and bowel sounds normal  MS: no gross musculoskeletal defects noted, no edema  SKIN: no suspicious lesions or rashes  NEURO: Normal strength and tone, mentation intact and speech normal  PSYCH: mentation appears normal, affect normal/bright  Left achilles tendon pain. No thickening. " "      ASSESSMENT/PLAN:       ICD-10-CM    1. Routine general medical examination at a health care facility Z00.00    2. NAYELY (generalized anxiety disorder) F41.1 FLUoxetine (PROZAC) 20 MG capsule     DISCONTINUED: escitalopram (LEXAPRO) 10 MG tablet   3. Screening PSA (prostate specific antigen) Z12.5 PSA, screen   4. Screening for diabetes mellitus Z13.1 Basic metabolic panel  (Ca, Cl, CO2, Creat, Gluc, K, Na, BUN)   5. Hyperlipidemia LDL goal <160 E78.5 Lipid panel reflex to direct LDL Fasting   6. Perceived hearing changes H90.5 AUDIOLOGY ADULT REFERRAL   7. Morbid obesity (H) E66.01    8. Achilles tendinitis, left leg M76.62 PODIATRY/FOOT & ANKLE SURGERY REFERRAL       COUNSELING:  Reviewed preventive health counseling, as reflected in patient instructions       Regular exercise       Healthy diet/nutrition    BP Readings from Last 1 Encounters:   05/01/19 128/84     Estimated body mass index is 35.66 kg/m  as calculated from the following:    Height as of this encounter: 1.765 m (5' 9.5\").    Weight as of this encounter: 111.1 kg (245 lb).      Weight management plan: Discussed healthy diet and exercise guidelines    Non smoker. Quit in 8 months ago.    Counseling Resources:  ATP IV Guidelines  Pooled Cohorts Equation Calculator  FRAX Risk Assessment  ICSI Preventive Guidelines  Dietary Guidelines for Americans, 2010  USDA's MyPlate  ASA Prophylaxis  Lung CA Screening    Piter Maynard PA-C  [facility]  "

## 2019-05-01 NOTE — LETTER
May 29, 2019      Harjit Medina  [address]  [place] [address]        Dear ,    We are writing to inform you of your test results.    Your triglyceride levels continue to be minimally elevated, but your cholesterol numbers have really improved (by 30 plus points). At this time continue to really work on a Lower fat, higher fiber diet and consistent exercise.   The rest of your lab work came back nice and normal.     Resulted Orders   Lipid panel reflex to direct LDL Fasting   Result Value Ref Range    Cholesterol 214 (H) <200 mg/dL      Comment:      Desirable:       <200 mg/dl    Triglycerides 254 (H) <150 mg/dL      Comment:      Borderline high:  150-199 mg/dl  High:             200-499 mg/dl  Very high:       >499 mg/dl  Fasting specimen      HDL Cholesterol 44 >39 mg/dL    LDL Cholesterol Calculated 119 (H) <100 mg/dL      Comment:      Above desirable:  100-129 mg/dl  Borderline High:  130-159 mg/dL  High:             160-189 mg/dL  Very high:       >189 mg/dl      Non HDL Cholesterol 170 (H) <130 mg/dL      Comment:      Above Desirable:  130-159 mg/dl  Borderline high:  160-189 mg/dl  High:             190-219 mg/dl  Very high:       >219 mg/dl     Basic metabolic panel  (Ca, Cl, CO2, Creat, Gluc, K, Na, BUN)   Result Value Ref Range    Sodium 137 133 - 144 mmol/L    Potassium 4.7 3.4 - 5.3 mmol/L    Chloride 103 94 - 109 mmol/L    Carbon Dioxide 25 20 - 32 mmol/L    Anion Gap 9 3 - 14 mmol/L    Glucose 88 70 - 99 mg/dL      Comment:      Fasting specimen    Urea Nitrogen 14 7 - 30 mg/dL    Creatinine 0.91 0.66 - 1.25 mg/dL    GFR Estimate >90 >60 mL/min/[1.73_m2]      Comment:      Non  GFR Calc  Starting 12/18/2018, serum creatinine based estimated GFR (eGFR) will be   calculated using the Chronic Kidney Disease Epidemiology Collaboration   (CKD-EPI) equation.      GFR Estimate If Black >90 >60 mL/min/[1.73_m2]      Comment:       GFR  Calc  Starting 12/18/2018, serum creatinine based estimated GFR (eGFR) will be   calculated using the Chronic Kidney Disease Epidemiology Collaboration   (CKD-EPI) equation.      Calcium 9.2 8.5 - 10.1 mg/dL   PSA, screen   Result Value Ref Range    PSA 0.68 0 - 4 ug/L      Comment:      Assay Method:  Chemiluminescence using Siemens Vista analyzer       If you have any questions or concerns, please call the clinic at the number listed above.       Sincerely,        Piter Maynard PA-C/char

## 2019-05-08 ENCOUNTER — OFFICE VISIT (OUTPATIENT)
Dept: AUDIOLOGY | Facility: CLINIC | Age: 54
End: 2019-05-08
Attending: PHYSICIAN ASSISTANT
Payer: COMMERCIAL

## 2019-05-08 DIAGNOSIS — H90.3 SENSORINEURAL HEARING LOSS (SNHL) OF BOTH EARS: Primary | ICD-10-CM

## 2019-05-08 PROCEDURE — 92557 COMPREHENSIVE HEARING TEST: CPT | Performed by: AUDIOLOGIST

## 2019-05-08 PROCEDURE — 92567 TYMPANOMETRY: CPT | Performed by: AUDIOLOGIST

## 2019-05-08 NOTE — PROGRESS NOTES
AUDIOLOGY REPORT    SUMMARY: Audiology visit completed. See audiogram for results.    RECOMMENDATIONS: Follow-up with ENT.    Jeanette Holman  Doctor of Audiology  [place] License # [id number]

## 2019-05-28 ENCOUNTER — OFFICE VISIT (OUTPATIENT)
Dept: OTOLARYNGOLOGY | Facility: CLINIC | Age: 54
End: 2019-05-28
Payer: COMMERCIAL

## 2019-05-28 VITALS — HEART RATE: 85 BPM | SYSTOLIC BLOOD PRESSURE: 134 MMHG | OXYGEN SATURATION: 96 % | DIASTOLIC BLOOD PRESSURE: 80 MMHG

## 2019-05-28 DIAGNOSIS — H90.3 SENSORINEURAL HEARING LOSS (SNHL) OF BOTH EARS: Primary | ICD-10-CM

## 2019-05-28 PROCEDURE — 99202 OFFICE O/P NEW SF 15 MIN: CPT | Performed by: OTOLARYNGOLOGY

## 2019-05-28 ASSESSMENT — ENCOUNTER SYMPTOMS
SPUTUM PRODUCTION: 0
DIZZINESS: 0
STRIDOR: 0
BLURRED VISION: 0
NAUSEA: 0
BRUISES/BLEEDS EASILY: 0
HEARTBURN: 0
VOMITING: 0
DOUBLE VISION: 0
HEMOPTYSIS: 0
TINGLING: 0
COUGH: 0
TREMORS: 0
SINUS PAIN: 0
HEADACHES: 0
CONSTITUTIONAL NEGATIVE: 1

## 2019-05-28 ASSESSMENT — PAIN SCALES - GENERAL: PAINLEVEL: NO PAIN (0)

## 2019-05-28 NOTE — LETTER
5/28/2019         RE: Harjit Medina  [address]  [place] [address]        Dear Colleague,    Thank you for referring your patient, Harjit Medina, to the [facility]. Please see a copy of my visit note below.    HPI    This is a 53 year old patient who has been having hearing impairment for the past several years. States that hearing loss is stable, has bilateral tinnitus. Denies any aural fullness, pressure, ear drainage, dizziness or vertigo. He has no hx of trauma, noise exposure, environmental allergies, ototoxic medication or family hx of hearing impairment.   His hearing test: sloping to severe high frequency SNHL bilaterally, excellent recognition. He has a hx of BARRINGTON and CPAP user.    Review of Systems   Constitutional: Negative.    HENT: Positive for hearing loss and tinnitus. Negative for congestion, ear discharge, ear pain, nosebleeds and sinus pain.    Eyes: Negative for blurred vision and double vision.   Respiratory: Negative for cough, hemoptysis, sputum production and stridor.    Gastrointestinal: Negative for heartburn, nausea and vomiting.   Skin: Negative.    Neurological: Negative for dizziness, tingling, tremors and headaches.   Endo/Heme/Allergies: Negative for environmental allergies. Does not bruise/bleed easily.         Physical Exam   Constitutional: He appears well-developed and well-nourished.   HENT:   Head: Normocephalic and atraumatic.   Right Ear: Tympanic membrane, external ear and ear canal normal. No drainage, swelling or tenderness. No middle ear effusion. Decreased hearing is noted.   Left Ear: Tympanic membrane, external ear and ear canal normal. No drainage, swelling or tenderness.  No middle ear effusion. Decreased hearing is noted.   Nose: Nose normal. No mucosal edema, rhinorrhea or septal deviation.   Mouth/Throat: Uvula is midline, oropharynx is clear and moist and mucous membranes are normal.   Eyes: Pupils are equal, round,  and reactive to light.   Neck: Normal range of motion. Neck supple.     No Spontaneous nystagmus.    A/P  This pleasant patient is having sloping to severe high frequency SNHL bilaterally, excellent recognition. He states that he did have an MRI several years ago that came back normal. Options discussed. His questions were answered. He will try HAs for his hearing impairment.       Again, thank you for allowing me to participate in the care of your patient.        Sincerely,        Deloris Garcia MD

## 2019-05-28 NOTE — NURSING NOTE
Harjit Medina's goals for this visit include:   Chief Complaint   Patient presents with     Consult     Hearing loss       He requests these members of his care team be copied on today's visit information: PCP    PCP: Piter Maynard    Referring Provider:  No referring provider defined for this encounter.    /80 (BP Location: Right arm, Patient Position: Chair, Cuff Size: Adult Large)   Pulse 85   SpO2 96%     Do you need any medication refills at today's visit? None    Anika Carrizales, [facility]

## 2019-05-28 NOTE — PROGRESS NOTES
HPI    This is a 53 year old patient who has been having hearing impairment for the past several years. States that hearing loss is stable, has bilateral tinnitus. Denies any aural fullness, pressure, ear drainage, dizziness or vertigo. He has no hx of trauma, noise exposure, environmental allergies, ototoxic medication or family hx of hearing impairment.   His hearing test: sloping to severe high frequency SNHL bilaterally, excellent recognition. He has a hx of BARRINGTON and CPAP user.    Review of Systems   Constitutional: Negative.    HENT: Positive for hearing loss and tinnitus. Negative for congestion, ear discharge, ear pain, nosebleeds and sinus pain.    Eyes: Negative for blurred vision and double vision.   Respiratory: Negative for cough, hemoptysis, sputum production and stridor.    Gastrointestinal: Negative for heartburn, nausea and vomiting.   Skin: Negative.    Neurological: Negative for dizziness, tingling, tremors and headaches.   Endo/Heme/Allergies: Negative for environmental allergies. Does not bruise/bleed easily.         Physical Exam   Constitutional: He appears well-developed and well-nourished.   HENT:   Head: Normocephalic and atraumatic.   Right Ear: Tympanic membrane, external ear and ear canal normal. No drainage, swelling or tenderness. No middle ear effusion. Decreased hearing is noted.   Left Ear: Tympanic membrane, external ear and ear canal normal. No drainage, swelling or tenderness.  No middle ear effusion. Decreased hearing is noted.   Nose: Nose normal. No mucosal edema, rhinorrhea or septal deviation.   Mouth/Throat: Uvula is midline, oropharynx is clear and moist and mucous membranes are normal.   Eyes: Pupils are equal, round, and reactive to light.   Neck: Normal range of motion. Neck supple.     No Spontaneous nystagmus.    A/P  This pleasant patient is having sloping to severe high frequency SNHL bilaterally, excellent recognition. He states that he did have an MRI several years  ago that came back normal. Options discussed. His questions were answered. He will try HAs for his hearing impairment.

## 2019-05-29 ENCOUNTER — OFFICE VISIT (OUTPATIENT)
Dept: AUDIOLOGY | Facility: CLINIC | Age: 54
End: 2019-05-29
Payer: COMMERCIAL

## 2019-05-29 DIAGNOSIS — H90.3 SENSORINEURAL HEARING LOSS (SNHL) OF BOTH EARS: Primary | ICD-10-CM

## 2019-05-29 PROCEDURE — 92591 HC HEARING AID EXAM BINAURAL: CPT | Performed by: AUDIOLOGIST

## 2019-05-29 NOTE — PROGRESS NOTES
AUDIOLOGY REPORT    SUBJECTIVE: Harjit Medina is a 53 year old male was seen in the Audiology Clinic at  [facility] on 5/29/19 to discuss concerns with hearing and functional communication difficulties. Harjit has been seen previously on 5/08/2019, and results revealed a high frequency sensorineural hearing loss bilaterally.  The patient was medically evaluated and determined to be cleared for binaural hearing aids by Kedar Garcia MD. Harjit notes difficulty with communication in a variety of listening situations.    OBJECTIVE:  Abuse Screening:  Do you feel unsafe at home or work/school? No  Do you feel threatened by someone? No  Does anyone try to keep you from having contact with others, or doing things outside of your home? No  Physical signs of abuse present? No    Patient is a hearing aid candidate. Patient would like to move forward with a hearing aid evaluation today. Therefore, the patient was presented with different options for amplification to help aid in communication. Discussed styles, levels of technology and monaural vs. binaural fitting.     The hearing aid(s) mutually chosen were:  Binaural: Unitron Moxi D Fit 7  COLOR: sylvia  BATTERY SIZE: 312  EARMOLD/TIPS: RITE Size 1  CANAL/ LENGTH: 1      ASSESSMENT:     ICD-10-CM    1. Sensorineural hearing loss (SNHL) of both ears H90.3 HEARING AID EXAM BINAURAL       Reviewed purchase information and warranty information with patient. The 45 day trial period was explained to patient. The patient was given a copy of the [place] [employer] consumer brochure on purchasing hearing instruments. Patient risk factors have been provided to the patient in writing prior to the sale of the hearing aid per FDA regulation. The risk factors are also available in the User Instructional Booklet to be presented on the day of the hearing aid fitting. Hearing aid(s) ordered. Hearing aid evaluation completed.    PLAN: Harjit  is scheduled to return in 2-3 weeks for a hearing aid fitting and programming. Purchase agreement will be completed on that date. Please contact this clinic with any questions or concerns.      Zainab Holman.  Doctor of Audiology  [place] License # [id number]

## 2019-06-25 ENCOUNTER — TELEPHONE (OUTPATIENT)
Dept: AUDIOLOGY | Facility: CLINIC | Age: 54
End: 2019-06-25

## 2019-06-25 NOTE — TELEPHONE ENCOUNTER
[facility] Call Center    Phone Message    May a detailed message be left on voicemail: yes    Reason for Call: Other: Patient called and stated he has made several attemps to  hearing aides. Patient states, he was advised he would receive a call when hearing aides are availible for . Patient states he has been waiting since May and no call recieved. Patient request call back, please advise.      Action Taken: Message routed to:  Adult Clinics: ENT p [phone]

## 2019-06-27 NOTE — TELEPHONE ENCOUNTER
Called patient back and apologized for the delay in connecting back.    Informed patient that Lopez Josesito is out of office this week but will be back on Monday. Patient reports that he has not received his hearing aids and has not heard anything. Writer informed that she will notify Lopez as soon as he returns and we will look into . Patient states he is okay to wait till Monday and is appreciative of the call back.    No further questions or concerns.

## 2019-07-10 ENCOUNTER — OFFICE VISIT (OUTPATIENT)
Dept: AUDIOLOGY | Facility: CLINIC | Age: 54
End: 2019-07-10
Payer: COMMERCIAL

## 2019-07-10 DIAGNOSIS — H90.3 BILATERAL SENSORINEURAL HEARING LOSS: Primary | ICD-10-CM

## 2019-07-10 PROCEDURE — V5011 HEARING AID FITTING/CHECKING: HCPCS | Performed by: AUDIOLOGIST

## 2019-07-10 PROCEDURE — V5020 CONFORMITY EVALUATION: HCPCS | Performed by: AUDIOLOGIST

## 2019-07-10 PROCEDURE — 92593 HC HEARING AID CHECK, BINAURAL: CPT | Performed by: AUDIOLOGIST

## 2019-07-10 PROCEDURE — V5261 HEARING AID, DIGIT, BIN, BTE: HCPCS | Performed by: AUDIOLOGIST

## 2019-07-10 PROCEDURE — V5160 DISPENSING FEE BINAURAL: HCPCS | Performed by: AUDIOLOGIST

## 2019-07-10 NOTE — PROGRESS NOTES
AUDIOLOGY REPORT    SUBJECTIVE: Harjit Medina, a 53 year old male, was seen in the Audiology Clinic at [facility] today for a Binaural hearing aid fitting. Previous results have revealed a bilateralsensorineural hearing loss. The patient was given medical clearance to pursue amplification by  Kedar Garcia MD.      OBJECTIVE:  Prior to fitting, a hearing aid check was performed to ensure device functionality. The hearing aid conformity evaluation was completed.The hearing aids were placed and they provided a good fit. Real-ear-probe-microphone measurements were completed on the [employer] system and were a good match to NAL-NL2 target with soft sounds audible, moderate sounds comfortable, and loud sounds below discomfort. UCLs are verified through maximum power output measures and demonstrate appropriate limiting of loud inputs. Mr. Medina was oriented to proper hearing aid use, care, cleaning (no water, dry brush), batteries (size 312, insertion/removal, toxicity, low-battery signal), aid insertion/removal, user booklet, warranty information, storage cases, and other hearing aid details. The patient confirmed understanding of hearing aid use and care, and showed proper insertion of hearing aid and batteries while in the office today. Mr. Medina reported good volume and sound quality today.    EAR(S) FIT: Binaural  MA HEARING AID MAKE: Right: Unitron; Left: Unitron  MA HEARING AID MODEL #: Right:  ; Left:    HEARING AID STYLE: Right: BTE; Left: BTE  DOME SIZE: Right:  medium; Left::  medium   LENGTH: Right:  1; Left:  1  SERIAL NUMBERS: Right: [id number]; Left: [id number]  WARRANTY END DATE: Right: 6/28/2023; Left:: 6/28/2023      CHARGES:   Hearing Aid Check: Binaural, 51745, $81.00  Dispensing Fee: Binaural, , $500.00  Fit/Orientation: Binaural, , $322.00  Hearing Aid Conformity Evaluation: , Qty:2 $174  Hearing Aid Digital: Binaural, BTE,   $4523    ASSESSMENT: Binaural hearing aid fitting completed today. Verification measures were performed. The 45 day trial period was explained to patient, and they expressed understanding. Mr. Medina signed the Hearing Aid Purchase Agreement and was given a copy, as well as details on his hearing aids. Patient was counseled that exact out of pocket amounts cannot be determined for hearing aid claims being sent to insurance. Any insurance coverage information presented to the patient is an estimate only, and is not a guarantee of payment. Patient has been advised to check with their own insurance.    PLAN: Mr. Medina will return for follow-up in 2-3 weeks for a hearing aid review appointment. Please call this clinic with questions regarding today's appointment.    Zainab Holman.  Doctor of Audiology  [place] License # [id number]

## 2019-07-10 NOTE — PATIENT INSTRUCTIONS

## 2019-08-07 ENCOUNTER — OFFICE VISIT (OUTPATIENT)
Dept: AUDIOLOGY | Facility: CLINIC | Age: 54
End: 2019-08-07
Payer: COMMERCIAL

## 2019-08-07 DIAGNOSIS — H90.3 SENSORINEURAL HEARING LOSS (SNHL) OF BOTH EARS: Primary | ICD-10-CM

## 2019-08-07 PROCEDURE — V5299 HEARING SERVICE: HCPCS | Performed by: AUDIOLOGIST

## 2019-08-07 NOTE — PROGRESS NOTES
AUDIOLOGY REPORT    SUBJECTIVE:Harjit Medina is a 53 year old male who was seen in the Audiology Clinic at the [facility] on 8/7/2019  for a follow-up check regarding the fitting of new hearing aids. Previous results have revealed a mild to severe high frequency sensorineural hearing loss bilaterally.  The patient has been seen previously in this clinic and was fit with Unitron Moxi D hearing aids on 7/10/2019.  Harjit reports good sound quality with the hearing aid(s).     OBJECTIVE:   The International Outcome Inventory-Hearing Aids (IOI-HA) was administered today.The patient s responses to the 7 questions can be compared to normative data relative to how others are performing with their hearing aids, as well as focusing audiologic care and counseling.This patient s Quality of Life score (Question 7) was 5, which is above normative average.    Reviewed 45 day trial period, care, cleaning (no water, dry brush), batteries (size 13) insertion/removal, toxicity, low-battery signal), aid insertion/removal, volume adjustment (if applicable), user booklet, warranty information, storage cases, and other hearing aid details.         No charge visit today (in warranty hearing aid check).     ASSESSMENT: A follow-up appointment for hearing aid fitting was completed today. IOI-HA administered today. Changes to hearing aid was completed as outlined above.     PLAN:Harjit will return for follow-up as needed, or at least every 4-6 months for cleaning and assessment of hearing aid.  . Please call this clinic with any questions regarding today s appointment.    Zainab Holman.  Doctor of Audiology  [place] License # [id number]

## 2020-02-24 ENCOUNTER — TELEPHONE (OUTPATIENT)
Dept: FAMILY MEDICINE | Facility: CLINIC | Age: 55
End: 2020-02-24

## 2020-02-24 NOTE — TELEPHONE ENCOUNTER
Patient was due for a follow up appointment 11/1/19.  Last Office Visit 5/1/19.     I assisted patient in scheduling a Depression follow up and to discuss Chantix with Piter Maynard PA-C on 3/4/20.     Molly Gunderson RN BSN

## 2020-03-03 NOTE — PROGRESS NOTES
Subjective     Harjit Medina is a 54 year old male who presents to clinic today for the following health issues:    HPI   Depression Followup    How are you doing with your depression since your last visit? No change    Are you having other symptoms that might be associated with depression? No    Have you had a significant life event?  No     Are you feeling anxious or having panic attacks?   No    Do you have any concerns with your use of alcohol or other drugs? No  Doing well overall  Still noting some irritability. Denies insomnia. No depression . Not suicidal. No panic attacks.   Social History     Tobacco Use     Smoking status: Current Every Day Smoker     Packs/day: 1.00     Types: Cigarettes     Smokeless tobacco: Former User     Quit date: 9/1/2018   Substance Use Topics     Alcohol use: Yes     Comment: couple days per week     Drug use: No     No flowsheet data found.  NAYELY-7 SCORE 7/12/2018   Total Score 6     No flowsheet data found.  NAYELY-7  7/12/2018   1. Feeling nervous, anxious, or on edge 0   2. Not being able to stop or control worrying 2   3. Worrying too much about different things 1   4. Trouble relaxing 0   5. Being so restless that it is hard to sit still 1   6. Becoming easily annoyed or irritable 2   7. Feeling afraid, as if something awful might happen 0   NAYELY-7 Total Score 6     .    Suicide Assessment Five-step Evaluation and Treatment (SAFE-T)      How many servings of fruits and vegetables do you eat daily?  0-1    On average, how many sweetened beverages do you drink each day (Examples: soda, juice, sweet tea, etc.  Do NOT count diet or artificially sweetened beverages)?   2    How many days per week do you exercise enough to make your heart beat faster? 6    How many minutes a day do you exercise enough to make your heart beat faster? 60 or more    How many days per week do you miss taking your medication? 0    Tobacco Cessation  Wants to try chantix.   Smoking less than a ppd.  "    Reviewed and updated as needed this visit by Provider         Review of Systems   ROS COMP: Constitutional, HEENT, cardiovascular, pulmonary, GI, , musculoskeletal, neuro, skin, endocrine and psych systems are negative, except as otherwise noted.      Objective    /85   Pulse 72   Temp 97.7  F (36.5  C) (Tympanic)   Resp 16   Ht 1.755 m (5' 9.09\")   Wt 106.1 kg (234 lb)   SpO2 96%   BMI 34.46 kg/m    Body mass index is 34.46 kg/m .  Physical Exam   Eye exam - right eye normal lid, conjunctiva, cornea, pupil and fundus, left eye normal lid, conjunctiva, cornea, pupil and fundus.  Thyroid not palpable, not enlarged, no nodules detected.  CHEST:chest clear to IPPA, no tachypnea, retractions or cyanosis and S1, S2 normal, no murmur, no gallop, rate regular.    Harjit was seen today for depression and smoking cessation.    Diagnoses and all orders for this visit:    Encounter for smoking cessation counseling  -     varenicline (CHANTIX AL) 0.5 MG X 11 & 1 MG X 42 tablet; Take 0.5 mg tab daily for 3 days, THEN 0.5 mg tab twice daily for 4 days, THEN 1 mg twice daily.  -     varenicline (CHANTIX) 1 MG tablet; Take 1 tablet (1 mg) by mouth 2 times daily    NAYELY (generalized anxiety disorder)  -     FLUoxetine (PROZAC) 40 MG capsule; Take 1 capsule (40 mg) by mouth daily      Nayely stable for the most part. Will try increasing his prozac from 20 to 40 mg daily.  work on lifestyle modification  Recheck in 6 mos   "

## 2020-03-04 ENCOUNTER — OFFICE VISIT (OUTPATIENT)
Dept: FAMILY MEDICINE | Facility: CLINIC | Age: 55
End: 2020-03-04
Payer: COMMERCIAL

## 2020-03-04 VITALS
DIASTOLIC BLOOD PRESSURE: 85 MMHG | SYSTOLIC BLOOD PRESSURE: 126 MMHG | HEART RATE: 72 BPM | RESPIRATION RATE: 16 BRPM | OXYGEN SATURATION: 96 % | WEIGHT: 234 LBS | TEMPERATURE: 97.7 F | BODY MASS INDEX: 34.66 KG/M2 | HEIGHT: 69 IN

## 2020-03-04 DIAGNOSIS — F41.1 GAD (GENERALIZED ANXIETY DISORDER): ICD-10-CM

## 2020-03-04 DIAGNOSIS — Z71.6 ENCOUNTER FOR SMOKING CESSATION COUNSELING: Primary | ICD-10-CM

## 2020-03-04 PROCEDURE — 99214 OFFICE O/P EST MOD 30 MIN: CPT | Performed by: PHYSICIAN ASSISTANT

## 2020-03-04 RX ORDER — VARENICLINE TARTRATE 1 MG/1
1 TABLET, FILM COATED ORAL 2 TIMES DAILY
Qty: 60 TABLET | Refills: 3 | Status: SHIPPED | OUTPATIENT
Start: 2020-03-04 | End: 2021-05-07

## 2020-03-04 RX ORDER — FLUOXETINE 40 MG/1
40 CAPSULE ORAL DAILY
Qty: 90 CAPSULE | Refills: 1 | Status: SHIPPED | OUTPATIENT
Start: 2020-03-04 | End: 2020-09-25

## 2020-03-04 ASSESSMENT — MIFFLIN-ST. JEOR: SCORE: 1893.29

## 2020-03-04 ASSESSMENT — PATIENT HEALTH QUESTIONNAIRE - PHQ9: SUM OF ALL RESPONSES TO PHQ QUESTIONS 1-9: 2

## 2020-09-25 DIAGNOSIS — F41.1 GAD (GENERALIZED ANXIETY DISORDER): ICD-10-CM

## 2020-09-25 RX ORDER — FLUOXETINE 40 MG/1
40 CAPSULE ORAL DAILY
Qty: 90 CAPSULE | Refills: 1 | Status: SHIPPED | OUTPATIENT
Start: 2020-09-25 | End: 2021-05-12

## 2021-05-07 ENCOUNTER — OFFICE VISIT (OUTPATIENT)
Dept: FAMILY MEDICINE | Facility: CLINIC | Age: 56
End: 2021-05-07
Payer: COMMERCIAL

## 2021-05-07 ENCOUNTER — NURSE TRIAGE (OUTPATIENT)
Dept: NURSING | Facility: CLINIC | Age: 56
End: 2021-05-07

## 2021-05-07 VITALS
TEMPERATURE: 97.6 F | SYSTOLIC BLOOD PRESSURE: 130 MMHG | HEART RATE: 83 BPM | HEIGHT: 69 IN | WEIGHT: 245 LBS | RESPIRATION RATE: 18 BRPM | BODY MASS INDEX: 36.29 KG/M2 | OXYGEN SATURATION: 98 % | DIASTOLIC BLOOD PRESSURE: 84 MMHG

## 2021-05-07 DIAGNOSIS — H10.9 BACTERIAL CONJUNCTIVITIS: Primary | ICD-10-CM

## 2021-05-07 PROCEDURE — 99213 OFFICE O/P EST LOW 20 MIN: CPT | Performed by: NURSE PRACTITIONER

## 2021-05-07 RX ORDER — POLYMYXIN B SULFATE AND TRIMETHOPRIM 1; 10000 MG/ML; [USP'U]/ML
1-2 SOLUTION OPHTHALMIC EVERY 4 HOURS
Qty: 10 ML | Refills: 0 | Status: ON HOLD | OUTPATIENT
Start: 2021-05-07 | End: 2021-11-06

## 2021-05-07 ASSESSMENT — MIFFLIN-ST. JEOR: SCORE: 1936.69

## 2021-05-07 NOTE — TELEPHONE ENCOUNTER
Pt is calling in about waking up today and his left eye had a lot of dried drainage in it, and his sclera is reddened in that left eye. Pt denies fever, or blurry vision. Pt reports the left eye was painful yesterday, but is better today. Pt denies redness of the eyelids. Pt also reports he has had sinus drainage for the past week.   Care advice given, and per protocol pt should be evaluated in the clinic today. Pt agrees with plan, and was transferred to scheduling. Pt was able to get an appointment for today at 220 pm in the clinic. Pt was advised to call back if discharge increases, if eyelid becomes red or swollen, if pain increases, or if symptoms worsen. Pt verbalized understanding.    Elian Cotto RN on 5/7/2021 at 12:27 PM      Reason for Disposition    Lots of yellow or green nasal discharge    Additional Information    Negative: Eye exposure to chemical or fumes    Negative: Redness of white of eye (sclera), but no pus or only a small amount of brief pus    Negative: SEVERE pain (e.g., excruciating)    Negative: Patient sounds very sick or weak to the triager    Negative: MODERATE eye pain (e.g., interferes with normal activities)    Negative: Blurred vision    Negative: Cloudy spot or sore seen on the cornea (clear part of the eye)    Negative: Eyelids are very swollen (shut or almost)    Negative: Eyelid (outer) is very red and painful (or tender to touch)    Negative: Fever > 103 F (39.4 C)    Negative: Discharge from penis    Negative: New or abnormal vaginal discharge    Negative: Using antibiotic eyedrops > 3 days but pus persists    Protocols used: EYE - PUS OR [id number]

## 2021-05-07 NOTE — PROGRESS NOTES
"Assessment & Plan     Bacterial conjunctivitis  Home care instructions were reviewed with the patient. The risks, benefits and treatment options of prescribed medications or other treatments have been discussed with the patient. The patient verbalized their understanding and should call or follow up if no improvement or if they develop further problems.    - trimethoprim-polymyxin b (POLYTRIM) 60358-1.1 UNIT/ML-% ophthalmic solution; Place 1-2 drops into both eyes every 4 hours             BMI:   Estimated body mass index is 36.18 kg/m  as calculated from the following:    Height as of this encounter: 1.753 m (5' 9\").    Weight as of this encounter: 111.1 kg (245 lb).   Weight management plan: Patient was referred to their PCP to discuss a diet and exercise plan.    Patient Instructions       Patient Education     Conjunctivitis Caused by Infection     Wash hands often to help prevent spreading infection.   Infections are caused by viruses or bacteria. Treatment includes keeping your eyes and hands clean. Your healthcare provider may prescribe eye drops and tell you to stay home from work or school if you re contagious. Untreated infections can be serious. It's important to see your provider for a diagnosis.   Viral infections  A cold, flu, or other virus can spread to your eyes. This causes a watery discharge. Your eyes may burn or itch and get red. Your eyelids may also be puffy and sore.   Treatment  Most viral infections go away on their own. Artificial tears and warm compresses can relieve symptoms. Your healthcare provider may also prescribe eye drops. A viral infection can be very contagious and spread quickly. To prevent this, wash your hands often. Use a separate tissue to wipe each eye. Don t touch your eyes or share bedding or towels. Use a new, clean washcloth every time you use one. Throw away eye cosmetics, especially mascara. Never use someone else's eye cosmetics. If you use contact lenses, follow " your healthcare provider's instructions on proper lens care.    Bacterial infections  Bacterial infections often happen in one eye. There may be a watery or a thick discharge from the eye. These infections can cause serious damage to your eye if not treated promptly.   Treatment  Your healthcare provider may prescribe eye drops or ointment to kill the bacteria. Use the medicine for the number of days it is prescribed. Don't stop using it when the symptoms improve. Other tips:     Warm compresses can help keep the eyelids clean.    Wash your hands often to keep the bacteria from spreading.    Use a separate tissue to wipe each eye.    Don't touch your eyes or share bedding or towels.    Use a new, clean washcloth every time you use one.    Throw away eye cosmetics, especially mascara.    Never use someone else's eye cosmetics.    If you use contact lenses, follow your healthcare provider's instructions on proper lens care.  [employer] last reviewed this educational content on 4/1/2020 2000-2021 [employer]. All rights reserved. This information is not intended as a substitute for professional medical care. Always follow your healthcare professional's instructions.               No follow-ups on file.    VIANCA Grady CNP  [facility]    Prisca     Harjit Medina is a 55 year old male who presents to clinic today for the following health issues accompanied by his :    HPI     Eye(s) Problem  Onset/Duration: yesterday   Description:   Location: Left eye.   Pain: YES  Redness: YES  Accompanying Signs & Symptoms:  Discharge/mattering: no  Swelling: YES  Visual changes: no  Fever: no  Nasal Congestion: YES  Bothered by bright lights: YES  History:  Trauma: no  Foreign body exposure: no   Wearing contacts: no  Therapies tried and outcome: Eye drops     Patient states he has been ill for about 3 weeks with a head cold he is congested in sinuses. He has had sinus drainage.  "He is having clear sinus mucus.     He states he started to have pain in the eye (left) that is somewhat painful in back of eye. Denies blurred or vision changes.         Review of Systems   Constitutional, HEENT, cardiovascular, pulmonary, GI, , musculoskeletal, neuro, skin, endocrine and psych systems are negative, except as otherwise noted.      Objective    /84   Pulse 83   Temp 97.6  F (36.4  C) (Temporal)   Resp 18   Ht 1.753 m (5' 9\")   Wt 111.1 kg (245 lb)   SpO2 98%   BMI 36.18 kg/m    Body mass index is 36.18 kg/m .  Physical Exam   GENERAL: healthy, alert and no distress  EYES: PERRL, EOMI, visual fields normal and conjunctiva/corneas- conjunctival injection each eye with purulent drainage  HENT: ear canals and TM's normal, nose and mouth without ulcers or lesions  NECK: no adenopathy, no asymmetry, masses, or scars and thyroid normal to palpation  RESP: lungs clear to auscultation - no rales, rhonchi or wheezes  CV: regular rate and rhythm, normal S1 S2, no S3 or S4, no murmur, click or rub, no peripheral edema and peripheral pulses strong          "

## 2021-05-07 NOTE — PATIENT INSTRUCTIONS
Patient Education     Conjunctivitis Caused by Infection     Wash hands often to help prevent spreading infection.   Infections are caused by viruses or bacteria. Treatment includes keeping your eyes and hands clean. Your healthcare provider may prescribe eye drops and tell you to stay home from work or school if you re contagious. Untreated infections can be serious. It's important to see your provider for a diagnosis.   Viral infections  A cold, flu, or other virus can spread to your eyes. This causes a watery discharge. Your eyes may burn or itch and get red. Your eyelids may also be puffy and sore.   Treatment  Most viral infections go away on their own. Artificial tears and warm compresses can relieve symptoms. Your healthcare provider may also prescribe eye drops. A viral infection can be very contagious and spread quickly. To prevent this, wash your hands often. Use a separate tissue to wipe each eye. Don t touch your eyes or share bedding or towels. Use a new, clean washcloth every time you use one. Throw away eye cosmetics, especially mascara. Never use someone else's eye cosmetics. If you use contact lenses, follow your healthcare provider's instructions on proper lens care.    Bacterial infections  Bacterial infections often happen in one eye. There may be a watery or a thick discharge from the eye. These infections can cause serious damage to your eye if not treated promptly.   Treatment  Your healthcare provider may prescribe eye drops or ointment to kill the bacteria. Use the medicine for the number of days it is prescribed. Don't stop using it when the symptoms improve. Other tips:     Warm compresses can help keep the eyelids clean.    Wash your hands often to keep the bacteria from spreading.    Use a separate tissue to wipe each eye.    Don't touch your eyes or share bedding or towels.    Use a new, clean washcloth every time you use one.    Throw away eye cosmetics, especially mascara.    Never  use someone else's eye cosmetics.    If you use contact lenses, follow your healthcare provider's instructions on proper lens care.  [employer] last reviewed this educational content on 4/1/2020 2000-2021 [employer]. All rights reserved. This information is not intended as a substitute for professional medical care. Always follow your healthcare professional's instructions.

## 2021-05-12 ENCOUNTER — OFFICE VISIT (OUTPATIENT)
Dept: FAMILY MEDICINE | Facility: CLINIC | Age: 56
End: 2021-05-12
Payer: COMMERCIAL

## 2021-05-12 ENCOUNTER — IMMUNIZATION (OUTPATIENT)
Dept: NURSING | Facility: CLINIC | Age: 56
End: 2021-05-12
Payer: COMMERCIAL

## 2021-05-12 VITALS
HEART RATE: 87 BPM | BODY MASS INDEX: 35.56 KG/M2 | WEIGHT: 240.8 LBS | TEMPERATURE: 97.2 F | DIASTOLIC BLOOD PRESSURE: 84 MMHG | OXYGEN SATURATION: 94 % | SYSTOLIC BLOOD PRESSURE: 136 MMHG | RESPIRATION RATE: 20 BRPM

## 2021-05-12 DIAGNOSIS — Z00.00 ROUTINE GENERAL MEDICAL EXAMINATION AT A HEALTH CARE FACILITY: Primary | ICD-10-CM

## 2021-05-12 DIAGNOSIS — Z13.1 SCREENING FOR DIABETES MELLITUS: ICD-10-CM

## 2021-05-12 DIAGNOSIS — Z13.220 LIPID SCREENING: ICD-10-CM

## 2021-05-12 DIAGNOSIS — Z12.5 SCREENING FOR PROSTATE CANCER: ICD-10-CM

## 2021-05-12 DIAGNOSIS — N52.8 OTHER MALE ERECTILE DYSFUNCTION: ICD-10-CM

## 2021-05-12 PROCEDURE — 91301 PR COVID VAC MODERNA 100 MCG/0.5 ML IM: CPT

## 2021-05-12 PROCEDURE — 99396 PREV VISIT EST AGE 40-64: CPT | Performed by: PHYSICIAN ASSISTANT

## 2021-05-12 PROCEDURE — 0011A PR COVID VAC MODERNA 100 MCG/0.5 ML IM: CPT

## 2021-05-12 RX ORDER — SILDENAFIL CITRATE 20 MG/1
TABLET ORAL
Qty: 30 TABLET | Refills: 11 | Status: SHIPPED | OUTPATIENT
Start: 2021-05-12 | End: 2021-07-26

## 2021-05-12 RX ORDER — SILDENAFIL CITRATE 20 MG/1
TABLET ORAL
Qty: 30 TABLET | Refills: 11 | Status: SHIPPED | OUTPATIENT
Start: 2021-05-12 | End: 2021-05-12

## 2021-05-12 NOTE — PROGRESS NOTES
3  SUBJECTIVE:   CC: Harjit Medina is an 55 year old male who presents for preventive health visit.       Patient has been advised of split billing requirements and indicates understanding: Yes  Healthy Habits:    Do you get at least three servings of calcium containing foods daily (dairy, green leafy vegetables, etc.)? yes    Amount of exercise or daily activities, outside of work: walks two miles a day    Problems taking medications regularly No    Medication side effects: No    Have you had an eye exam in the past two years? yes    Do you see a dentist twice per year? yes    Do you have sleep apnea, excessive snoring or daytime drowsiness?yes      Today's PHQ-2 Score:   PHQ-2 ( 1999 Pfizer) 5/12/2021 5/7/2021   Q1: Little interest or pleasure in doing things 0 0   Q2: Feeling down, depressed or hopeless 1 0   PHQ-2 Score 1 0       Abuse: Current or Past(Physical, Sexual or Emotional)- No  Do you feel safe in your environment? Yes    Have you ever done Advance Care Planning? (For example, a Health Directive, POLST, or a discussion with a medical provider or your loved ones about your wishes): No, advance care planning information given to patient to review.  Patient declined advance care planning discussion at this time.    Social History     Tobacco Use     Smoking status: Former Smoker     Packs/day: 1.00     Types: Cigarettes     Smokeless tobacco: Former User     Quit date: 9/1/2018   Substance Use Topics     Alcohol use: Yes     Comment: occ -      If you drink alcohol do you typically have >3 drinks per day or >7 drinks per week? Not Applicable                      Last PSA:   PSA   Date Value Ref Range Status   05/01/2019 0.68 0 - 4 ug/L Final     Comment:     Assay Method:  Chemiluminescence using Siemens Vista analyzer       Reviewed orders with patient. Reviewed health maintenance and updated orders accordingly - Yes  Lab work is in process  Labs reviewed in EPIC  BP Readings from Last 3  Encounters:   05/12/21 136/84   05/07/21 130/84   03/04/20 126/85    Wt Readings from Last 3 Encounters:   05/12/21 109.2 kg (240 lb 12.8 oz)   05/07/21 111.1 kg (245 lb)   03/04/20 106.1 kg (234 lb)                  Patient Active Problem List   Diagnosis     Hyperlipidemia LDL goal <160     NAYELY (generalized anxiety disorder)     Tobacco use disorder     BARR[place] (obstructive sleep apnea)     Obesity (BMI 35.0-39.9) with comorbidity (H)     Past Surgical History:   Procedure Laterality Date     COLONOSCOPY N/A 1/4/2018    Procedure: COMBINED COLONOSCOPY, SINGLE OR MULTIPLE BIOPSY/POLYPECTOMY BY BIOPSY;;  Surgeon: Leeroy Jacobsen MD;  Location: [facility] OR     COLONOSCOPY WITH CO2 INSUFFLATION N/A 1/4/2018    Procedure: COLONOSCOPY WITH CO2 INSUFFLATION;  COLON-SCREENING / LIMON ;  Surgeon: Leeroy Jacobsen MD;  Location: [facility] OR       Social History     Tobacco Use     Smoking status: Former Smoker     Packs/day: 1.00     Types: Cigarettes     Smokeless tobacco: Former User     Quit date: 9/1/2018   Substance Use Topics     Alcohol use: Yes     Comment: occ -      Family History   Problem Relation Age of Onset     Dementia Mother      Other Cancer Father         brain      Diabetes Brother          Current Outpatient Medications   Medication Sig Dispense Refill     order for DME Equipment being ordered: CPAP and all necessary supplies (Patient not taking: Reported on 5/12/2021) 1 Device 0     trimethoprim-polymyxin b (POLYTRIM) 58111-7.1 UNIT/ML-% ophthalmic solution Place 1-2 drops into both eyes every 4 hours 10 mL 0     No Known Allergies  Recent Labs   Lab Test 05/01/19  1044 11/30/17  0941 11/14/16  0952 11/05/15  0936   * 146* 157* 144*   HDL 44 52 42 42   TRIG 254* 164* 155* 197*   CR 0.91  --  1.00 0.98   GFRESTIMATED >90  --  79 81   GFRESTBLACK >90  --  >90   GFR Calc   >90   GFR Calc     POTASSIUM 4.7  --  4.2 4.8   TSH  --   --   --  2.69        Reviewed and  updated as needed this visit by clinical staff  Tobacco  Allergies  Meds   Med Hx  Surg Hx  Fam Hx  Soc Hx        Reviewed and updated as needed this visit by Provider                Past Medical History:   Diagnosis Date     NAYELY (generalized anxiety disorder)      Sleep apnea     cpap      Past Surgical History:   Procedure Laterality Date     COLONOSCOPY N/A 1/4/2018    Procedure: COMBINED COLONOSCOPY, SINGLE OR MULTIPLE BIOPSY/POLYPECTOMY BY BIOPSY;;  Surgeon: Leeroy Jacobsen MD;  Location: [facility] OR     COLONOSCOPY WITH CO2 INSUFFLATION N/A 1/4/2018    Procedure: COLONOSCOPY WITH CO2 INSUFFLATION;  COLON-SCREENING / LIMON ;  Surgeon: Leeroy Jacobsen MD;  Location: [facility] OR       ROS:  CONSTITUTIONAL: NEGATIVE for fever, chills, change in weight  INTEGUMENTARY/SKIN: NEGATIVE for worrisome rashes, moles or lesions  EYES: NEGATIVE for vision changes or irritation  ENT: NEGATIVE for ear, mouth and throat problems  RESP: NEGATIVE for significant cough or SOB  CV: NEGATIVE for chest pain, palpitations or peripheral edema  GI: NEGATIVE for nausea, abdominal pain, heartburn, or change in bowel habits   male: negative for dysuria, hematuria, decreased urinary stream, erectile dysfunction, urethral discharge  MUSCULOSKELETAL: NEGATIVE for significant arthralgias or myalgia  NEURO: NEGATIVE for weakness, dizziness or paresthesias  PSYCHIATRIC: NEGATIVE for changes in mood or affect    OBJECTIVE:   /84   Pulse 87   Temp 97.2  F (36.2  C) (Tympanic)   Resp 20   Wt 109.2 kg (240 lb 12.8 oz)   SpO2 94%   BMI 35.56 kg/m    EXAM:  GENERAL: healthy, alert and no distress  EYES: Eyes grossly normal to inspection, PERRL and conjunctivae and sclerae normal  HENT: ear canals and TM's normal, nose and mouth without ulcers or lesions  NECK: no adenopathy, no asymmetry, masses, or scars and thyroid normal to palpation  RESP: lungs clear to auscultation - no rales, rhonchi or wheezes  CV: regular rate and  "rhythm, normal S1 S2, no S3 or S4, no murmur, click or rub, no peripheral edema and peripheral pulses strong  ABDOMEN: soft, nontender, no hepatosplenomegaly, no masses and bowel sounds normal  MS: no gross musculoskeletal defects noted, no edema  SKIN: no suspicious lesions or rashes  NEURO: Normal strength and tone, mentation intact and speech normal  PSYCH: mentation appears normal, affect normal/bright    Diagnostic Test Results:  Labs reviewed in Epic    ASSESSMENT/PLAN:       ICD-10-CM    1. Routine general medical examination at a health care facility  Z00.00        Patient has been advised of split billing requirements and indicates understanding: Yes  COUNSELING:  Reviewed preventive health counseling, as reflected in patient instructions       Regular exercise       Healthy diet/nutrition    Estimated body mass index is 35.56 kg/m  as calculated from the following:    Height as of 5/7/21: 1.753 m (5' 9\").    Weight as of this encounter: 109.2 kg (240 lb 12.8 oz).    Weight management plan: Discussed healthy diet and exercise guidelines    He reports that he has quit smoking. His smoking use included cigarettes. He smoked 1.00 pack per day. He quit smokeless tobacco use about 2 years ago.      Counseling Resources:  ATP IV Guidelines  Pooled Cohorts Equation Calculator  FRAX Risk Assessment  ICSI Preventive Guidelines  Dietary Guidelines for Americans, 2010  USDA's MyPlate  ASA Prophylaxis  Lung CA Screening    [facility]  "

## 2021-06-09 ENCOUNTER — IMMUNIZATION (OUTPATIENT)
Dept: NURSING | Facility: CLINIC | Age: 56
End: 2021-06-09
Attending: FAMILY MEDICINE
Payer: COMMERCIAL

## 2021-06-09 PROCEDURE — 91301 PR COVID VAC MODERNA 100 MCG/0.5 ML IM: CPT

## 2021-06-09 PROCEDURE — 0012A PR COVID VAC MODERNA 100 MCG/0.5 ML IM: CPT

## 2021-07-26 ENCOUNTER — OFFICE VISIT (OUTPATIENT)
Dept: FAMILY MEDICINE | Facility: CLINIC | Age: 56
End: 2021-07-26
Payer: COMMERCIAL

## 2021-07-26 VITALS
DIASTOLIC BLOOD PRESSURE: 85 MMHG | WEIGHT: 235.8 LBS | BODY MASS INDEX: 34.82 KG/M2 | TEMPERATURE: 97.3 F | RESPIRATION RATE: 20 BRPM | SYSTOLIC BLOOD PRESSURE: 130 MMHG | OXYGEN SATURATION: 93 % | HEART RATE: 67 BPM

## 2021-07-26 DIAGNOSIS — L82.0 INFLAMED SEBORRHEIC KERATOSIS: ICD-10-CM

## 2021-07-26 DIAGNOSIS — Z72.0 NICOTINE ABUSE: Primary | ICD-10-CM

## 2021-07-26 DIAGNOSIS — N52.8 OTHER MALE ERECTILE DYSFUNCTION: ICD-10-CM

## 2021-07-26 DIAGNOSIS — F41.1 GAD (GENERALIZED ANXIETY DISORDER): ICD-10-CM

## 2021-07-26 PROCEDURE — 99214 OFFICE O/P EST MOD 30 MIN: CPT | Mod: 25 | Performed by: PHYSICIAN ASSISTANT

## 2021-07-26 PROCEDURE — 17110 DESTRUCTION B9 LES UP TO 14: CPT | Performed by: PHYSICIAN ASSISTANT

## 2021-07-26 RX ORDER — ESCITALOPRAM OXALATE 10 MG/1
TABLET ORAL
Qty: 30 TABLET | Refills: 1 | Status: SHIPPED | OUTPATIENT
Start: 2021-07-26 | End: 2022-10-06

## 2021-07-26 RX ORDER — SILDENAFIL CITRATE 20 MG/1
TABLET ORAL
Qty: 30 TABLET | Refills: 11 | Status: SHIPPED | OUTPATIENT
Start: 2021-07-26 | End: 2021-08-25

## 2021-07-26 RX ORDER — VARENICLINE TARTRATE 1 MG/1
1 TABLET, FILM COATED ORAL 2 TIMES DAILY
Qty: 60 TABLET | Refills: 2 | Status: ON HOLD | OUTPATIENT
Start: 2021-07-26 | End: 2021-11-06

## 2021-07-26 NOTE — PROGRESS NOTES
Subjective   Harjit is a 55 year old who presents for the following health issues    HPI     Derm Concern  - spots head on head   - has discussed with Piter before  - hunt told him to have one spot checks, getting larger  Likely a seborrheic keratosis.     No taste/smell since April  - has talked to Piter about this before    Smoking Cessation  - would like Chantix  30 pyrhx of smoking.  Anxiety Follow-Up    How are you doing with your anxiety since your last visit? quick temper    Are you having other symptoms that might be associated with anxiety? No    Have you had a significant life event? No     Are you feeling depressed? No    Do you have any concerns with your use of alcohol or other drugs? No  No depression. No anhedonia. Anxiety stable.   Social History     Tobacco Use     Smoking status: Current Every Day Smoker     Packs/day: 1.00     Types: Cigarettes     Smokeless tobacco: Current User     Last attempt to quit: 9/1/2018   Vaping Use     Vaping Use: Never used   Substance Use Topics     Alcohol use: Yes     Comment: occ -      Drug use: No     NAYELY-7 SCORE 7/12/2018   Total Score 6     PHQ 3/4/2020   PHQ-9 Total Score 2   Q9: Thoughts of better off dead/self-harm past 2 weeks Not at all     Last PHQ-9 3/4/2020   1.  Little interest or pleasure in doing things 0   2.  Feeling down, depressed, or hopeless 1   3.  Trouble falling or staying asleep, or sleeping too much 0   4.  Feeling tired or having little energy 0   5.  Poor appetite or overeating 0   6.  Feeling bad about yourself 0   7.  Trouble concentrating 1   8.  Moving slowly or restless 0   Q9: Thoughts of better off dead/self-harm past 2 weeks 0   PHQ-9 Total Score 2     NAYELY-7  7/12/2018   1. Feeling nervous, anxious, or on edge 0   2. Not being able to stop or control worrying 2   3. Worrying too much about different things 1   4. Trouble relaxing 0   5. Being so restless that it is hard to sit still 1   6. Becoming easily annoyed or  irritable 2   7. Feeling afraid, as if something awful might happen 0   NAYELY-7 Total Score 6         How many servings of fruits and vegetables do you eat daily?  0-1    On average, how many sweetened beverages do you drink each day (Examples: soda, juice, sweet tea, etc.  Do NOT count diet or artificially sweetened beverages)?   3    How many days per week do you exercise enough to make your heart beat faster? 7    How many minutes a day do you exercise enough to make your heart beat faster? 60 or more  How many days per week do you miss taking your medication? NA    What makes it hard for you to take your medications?  NA      Review of Systems   Constitutional, HEENT, cardiovascular, pulmonary, GI, , musculoskeletal, neuro, skin, endocrine and psych systems are negative, except as otherwise noted.      Objective    /85   Pulse 67   Temp 97.3  F (36.3  C) (Tympanic)   Resp 20   Wt 107 kg (235 lb 12.8 oz)   SpO2 93%   BMI 34.82 kg/m    Body mass index is 34.82 kg/m .  Physical Exam   2cm diameter pigmented lesion on scalp. Suspect seborrheic keratosis.   Lesion was tx'd with 2 ftc's of cryotherapy.   Eye exam - right eye normal lid, conjunctiva, cornea, pupil and fundus, left eye normal lid, conjunctiva, cornea, pupil and fundus.  Thyroid not palpable, not enlarged, no nodules detected.  CHEST:chest clear to IPPA, no tachypnea, retractions or cyanosis and S1, S2 normal, no murmur, no gallop, rate regular.    Harjit was seen today for derm problem, recheck, anxiety and smoking cessation.    Diagnoses and all orders for this visit:    Nicotine abuse  -     CT Chest Lung Cancer Scrn Low Dose wo; Future  -     varenicline (CHANTIX AL) 0.5 MG X 11 & 1 MG X 42 tablet; Take 0.5 mg tab daily for 3 days, THEN 0.5 mg tab twice daily for 4 days, THEN 1 mg twice daily.  -     varenicline (CHANTIX) 1 MG tablet; Take 1 tablet (1 mg) by mouth 2 times daily    Inflamed seborrheic keratosis  -     DESTRUCT BENIGN  LESION, UP TO 14    NAYELY (generalized anxiety disorder)  -     escitalopram (LEXAPRO) 10 MG tablet; Take 1/2 tab daily for 1 week, then increase to 1 full tab daily thereafter.    Other male erectile dysfunction  -     sildenafil (REVATIO) 20 MG tablet; 2-5 tabs 1/2 to 4 hours prior to relations    recheck in 1 mos via a virtual visit (re: his anxiety).  work on lifestyle modification.

## 2021-07-28 ENCOUNTER — ANCILLARY PROCEDURE (OUTPATIENT)
Dept: CT IMAGING | Facility: CLINIC | Age: 56
End: 2021-07-28
Attending: PHYSICIAN ASSISTANT
Payer: COMMERCIAL

## 2021-07-28 DIAGNOSIS — Z72.0 NICOTINE ABUSE: ICD-10-CM

## 2021-07-28 PROCEDURE — 71271 CT THORAX LUNG CANCER SCR C-: CPT | Mod: TC | Performed by: RADIOLOGY

## 2021-07-29 DIAGNOSIS — I25.10 CORONARY ARTERY CALCIFICATION SEEN ON CAT SCAN: ICD-10-CM

## 2021-07-29 DIAGNOSIS — Z82.49 FAMILY HISTORY OF ISCHEMIC HEART DISEASE: Primary | ICD-10-CM

## 2021-07-30 ENCOUNTER — TELEPHONE (OUTPATIENT)
Dept: FAMILY MEDICINE | Facility: CLINIC | Age: 56
End: 2021-07-30

## 2021-07-30 NOTE — TELEPHONE ENCOUNTER
I contacted pharmacy ([id number] - [place], [place] - [address]    Patient picked up the Escitalopram (LEXAPRO) 10 MG tablet this morning.     Molly Gunderson RN BSN  [facility]

## 2021-07-30 NOTE — TELEPHONE ENCOUNTER
Patient calling to have rx for escitalopram (LEXAPRO) 10 MG tablet called into Pharmacy. Per pharmacy they did not receive the script that was sent on 7/26/2021.

## 2021-08-25 ENCOUNTER — OFFICE VISIT (OUTPATIENT)
Dept: FAMILY MEDICINE | Facility: CLINIC | Age: 56
End: 2021-08-25
Payer: COMMERCIAL

## 2021-08-25 DIAGNOSIS — F41.1 GAD (GENERALIZED ANXIETY DISORDER): ICD-10-CM

## 2021-08-25 DIAGNOSIS — N52.8 OTHER MALE ERECTILE DYSFUNCTION: ICD-10-CM

## 2021-08-25 DIAGNOSIS — R41.840 INATTENTION: Primary | ICD-10-CM

## 2021-08-25 PROCEDURE — 99214 OFFICE O/P EST MOD 30 MIN: CPT | Performed by: PHYSICIAN ASSISTANT

## 2021-08-25 RX ORDER — SILDENAFIL CITRATE 20 MG/1
TABLET ORAL
Qty: 30 TABLET | Refills: 11 | Status: ON HOLD | OUTPATIENT
Start: 2021-08-25 | End: 2023-03-21

## 2021-08-25 ASSESSMENT — ANXIETY QUESTIONNAIRES
GAD7 TOTAL SCORE: 5
6. BECOMING EASILY ANNOYED OR IRRITABLE: SEVERAL DAYS
7. FEELING AFRAID AS IF SOMETHING AWFUL MIGHT HAPPEN: NOT AT ALL
1. FEELING NERVOUS, ANXIOUS, OR ON EDGE: SEVERAL DAYS
IF YOU CHECKED OFF ANY PROBLEMS ON THIS QUESTIONNAIRE, HOW DIFFICULT HAVE THESE PROBLEMS MADE IT FOR YOU TO DO YOUR WORK, TAKE CARE OF THINGS AT HOME, OR GET ALONG WITH OTHER PEOPLE: SOMEWHAT DIFFICULT
2. NOT BEING ABLE TO STOP OR CONTROL WORRYING: SEVERAL DAYS
3. WORRYING TOO MUCH ABOUT DIFFERENT THINGS: SEVERAL DAYS
5. BEING SO RESTLESS THAT IT IS HARD TO SIT STILL: SEVERAL DAYS

## 2021-08-25 ASSESSMENT — PATIENT HEALTH QUESTIONNAIRE - PHQ9
SUM OF ALL RESPONSES TO PHQ QUESTIONS 1-9: 5
5. POOR APPETITE OR OVEREATING: NOT AT ALL

## 2021-08-25 NOTE — PROGRESS NOTES
Prisca Lombardi is a 55 year old who presents for the following health issues   HPI     Anxiety Follow-Up    How are you doing with your anxiety since your last visit? Improved and still has some moments    Are you having other symptoms that might be associated with anxiety? No    Have you had a significant life event? No     Are you feeling depressed? Yes:  blah moments every once in awhile    Do you have any concerns with your use of alcohol or other drugs? No  Overall doing well. Forgetful at times. Forgets names. Mom had dementia. No ha's or dizziness. No syncope. No unilateral paresthesias/paresis. History of inattention. Has never medicated. Task completion issues. Overwhelmed easily. He's always felt he's had adhd.     Social History     Tobacco Use     Smoking status: Current Every Day Smoker     Packs/day: 1.00     Types: Cigarettes     Smokeless tobacco: Current User     Last attempt to quit: 9/1/2018   Vaping Use     Vaping Use: Never used   Substance Use Topics     Alcohol use: Yes     Comment: occ -      Drug use: No     NAYELY-7 SCORE 7/12/2018 8/25/2021   Total Score 6 5     PHQ 3/4/2020 8/25/2021   PHQ-9 Total Score 2 5   Q9: Thoughts of better off dead/self-harm past 2 weeks Not at all Not at all     Last PHQ-9 8/25/2021   1.  Little interest or pleasure in doing things 0   2.  Feeling down, depressed, or hopeless 0   3.  Trouble falling or staying asleep, or sleeping too much 0   4.  Feeling tired or having little energy 2   5.  Poor appetite or overeating 0   6.  Feeling bad about yourself 0   7.  Trouble concentrating 3   8.  Moving slowly or restless 0   Q9: Thoughts of better off dead/self-harm past 2 weeks 0   PHQ-9 Total Score 5   Difficulty at work, home, or with people Somewhat difficult     NAYELY-7  8/25/2021   1. Feeling nervous, anxious, or on edge 1   2. Not being able to stop or control worrying 1   3. Worrying too much about different things 1   4. Trouble relaxing 0   5. Being  so restless that it is hard to sit still 1   6. Becoming easily annoyed or irritable 1   7. Feeling afraid, as if something awful might happen 0   NAYELY-7 Total Score 5   If you checked any problems, how difficult have they made it for you to do your work, take care of things at home, or get along with other people? Somewhat difficult           Review of Systems   Constitutional, HEENT, cardiovascular, pulmonary, GI, , musculoskeletal, neuro, skin, endocrine and psych systems are negative, except as otherwise noted.      Objective    There were no vitals taken for this visit.  There is no height or weight on file to calculate BMI.  Physical Exam   Eye exam - right eye normal lid, conjunctiva, cornea, pupil and fundus, left eye normal lid, conjunctiva, cornea, pupil and fundus.  Thyroid not palpable, not enlarged, no nodules detected.  CHEST:chest clear to IPPA, no tachypnea, retractions or cyanosis and S1, S2 normal, no murmur, no gallop, rate regular.    Harjit was seen today for recheck medication.    Diagnoses and all orders for this visit:    Inattention  -     MENTAL HEALTH REFERRAL  - Adult; Assessments and Testing; ADHD; [facility] [phone]; We will contact you to schedule the appointment or please call with any questions; Future    NAYELY (generalized anxiety disorder)    Other male erectile dysfunction  -     sildenafil (REVATIO) 20 MG tablet; 2-5 tabs 1/2 to 4 hours prior to relations      work on lifestyle modification  Continue lexapro for now.   Recheck after adhd evaluation.

## 2021-08-26 ASSESSMENT — ANXIETY QUESTIONNAIRES: GAD7 TOTAL SCORE: 5

## 2021-08-30 NOTE — PROGRESS NOTES
Chief Complaint: Coronary artery calcifications on CT     HPI: Harjit Medina is a 56 year old male with a past medical history of obstructive sleep apnea (on CPAP) who was referred to me for evaluation of incidentally discovered coronary artery calcifications on lung cancer screening CT by Mr. Piter Maynard.     Briefly, Mr. Medina had a lung cancer screening CT earlier this summer. This was unremarkable for high-risk lung lesions but he was found to have extensive coronary artery calcifications. In view of this, he was referred to Cardiology for further evaluation.    At the time of the consultation, Mr. Medina notes that he has had brief periods of chest tightness while sitting down.  The chest tightness lasts for less than a minute on each episode and he has about one episode per week.  He noticed the first episode about 6 months ago.  He has no relieving or exacerbating factors.  There is no radiation.  Mr. Medina notes an absence of chest pain with exertion, dyspnea at rest or with exertion, PND, orthopnea, peripheral edema, palpitations, lightheadedness or syncope.  Mr. Mercedes notes that he has 2 large Syriac jeffery's which he takes walking at least 2 miles per day.  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  Mr. Mednia has never had any exertional chest tightness during these activities.    A comprehensive ROS was done and the details are included above in the HPI.    Past Medical History:  - No prior history of hypertension, T2DM, dyslipidemia, CAD, TIA/stroke, vascular aneurysms, PAD  Past Medical History:   Diagnosis Date     NAYELY (generalized anxiety disorder)      Sleep apnea     cpap       Past Surgical History:    Past Surgical History:   Procedure Laterality Date     COLONOSCOPY N/A 1/4/2018    Procedure: COMBINED COLONOSCOPY, SINGLE OR MULTIPLE BIOPSY/POLYPECTOMY BY BIOPSY;;  Surgeon: Leeroy Jacobsen MD;  Location:  OR      COLONOSCOPY WITH CO2 INSUFFLATION N/A 1/4/2018    Procedure: COLONOSCOPY WITH CO2 INSUFFLATION;  COLON-SCREENING / LIMON ;  Surgeon: Leeroy Jacobsen MD;  Location:  OR       Drug History:  Home cardiac meds: None.  Current Outpatient Medications   Medication Sig Dispense Refill     aspirin (ASA) 81 MG EC tablet Take 1 tablet (81 mg) by mouth daily 90 tablet 3     atorvastatin (LIPITOR) 40 MG tablet Take 1 tablet (40 mg) by mouth daily 90 tablet 3     escitalopram (LEXAPRO) 10 MG tablet Take 1/2 tab daily for 1 week, then increase to 1 full tab daily thereafter. 30 tablet 1     order for DME Equipment being ordered: CPAP and all necessary supplies 1 Device 0     sildenafil (REVATIO) 20 MG tablet 2-5 tabs 1/2 to 4 hours prior to relations (Patient not taking: Reported on 9/1/2021) 30 tablet 11     trimethoprim-polymyxin b (POLYTRIM) 86831-4.1 UNIT/ML-% ophthalmic solution Place 1-2 drops into both eyes every 4 hours (Patient not taking: Reported on 7/26/2021) 10 mL 0     varenicline (CHANTIX AL) 0.5 MG X 11 & 1 MG X 42 tablet Take 0.5 mg tab daily for 3 days, THEN 0.5 mg tab twice daily for 4 days, THEN 1 mg twice daily. 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   Social History     Tobacco Use     Smoking status: Current Every Day Smoker     Packs/day: 1.00     Types: Cigarettes     Smokeless tobacco: Current User     Last attempt to quit: 9/1/2018   Substance Use Topics     Alcohol use: Yes     Comment: occ -        No Known Allergies      Physical Examination:  Vitals: /83  (BP Location: Left arm, Patient Position: Sitting, Cuff Size: Adult Large)   Pulse 62   Wt 106.7 kg (235 lb 4.8 oz)   SpO2 96%   BMI 34.75 kg/m    BMI= Body mass index is 34.75 kg/m .    GENERAL: Healthy, alert and no distress  Eyes: No xanthelasmas.  NECK: No palpable neck masses/goitre and no evidence of retrosternal goitre.   ENT: Moist mucosal membranes.  RESPIRATORY: No signs of resp distress. Lungs CTAB.  CARDIOVASCULAR:  No JVD, regular, normal S1+S2 without added sounds or murmurs.  ABDOMEN: ND, soft, non-tender, normal bowel sounds.  EXTREMITIES: Warm, well-perfused, no edema.   NEUROLOGY: GCS 15/15, no focal deficits.  VASC: No carotid bruits. Carotid, radial, brachial, popliteal, dorsalis pedis and posterior tibialis pulses are normal in volumes and symmetric bilaterally.   SKIN: No ecchymoses, no rashes.  PSYCH: Cooperative, pleasant affect.       Investigations:  I personally viewed and interpreted the following investigations:    Labs:    CBC RESULTS:  Lab Results   Component Value Date    WBC 15.0 (H) 09/27/2016    RBC 4.81 09/27/2016    HGB 15.8 09/27/2016    HCT 46.1 09/27/2016    MCV 96 09/27/2016    MCH 32.8 09/27/2016    MCHC 34.3 09/27/2016    RDW 12.8 09/27/2016     09/27/2016       CMP RESULTS:  Lab Results   Component Value Date     05/01/2019    POTASSIUM 4.7 05/01/2019    CHLORIDE 103 05/01/2019    CO2 25 05/01/2019    ANIONGAP 9 05/01/2019    GLC 88 05/01/2019    BUN 14 05/01/2019    CR 0.91 05/01/2019    GFRESTIMATED >90 05/01/2019    GFRESTBLACK >90 05/01/2019    NIYA 9.2 05/01/2019          LIPIDS:  Lab Results   Component Value Date    CHOL 214 05/01/2019     Lab Results   Component Value Date    HDL 44 05/01/2019     Lab Results   Component Value Date     05/01/2019     Lab Results   Component Value Date    TRIG 254 05/01/2019     Lab Results   Component Value Date    CHOLHDLRATIO 5.4 11/05/2015       Recent Tests:    Electrocardiogram (09/01/2021):  Normal  sinus rhythm, normal axis, normal intervals, poor R wave progression.      CT Lung Cancer Screening 07/28/2021:                                                                   IMPRESSION:   1. ACR Assessment Category:  Lung-RADS Category 2. Benign appearance or behavior. Recommendation: Continue annual screening, if clinically relevant (please order exam code IMG 2290). .   2. Significant Incidental Finding(s):  Category S: Yes.  *  Extensive atherosclerotic vascular calcification of the coronary arteries and thoracic aorta.    *  Hepatic steatosis.  *  Cholelithiasis without CT evidence of acute cholecystitis.      Assessment and Plan:   Harjit Medina is a 56 year old male with a past medical history of smoking and incidentally discovered coronary calcification on a lung cancer screening CT was initially referred to me in September 2021 for evaluation of the coronary calcifications.    By my visual assessment, it seems that Mr. Garcías coronary calcifications are severe.  I had an extensive discussion with him about the implications and the nature of these coronary artery calcifications.  He noted that he is extremely interested in changing his lifestyle to prevent the progression of his coronary calcifications.  We discussed cardiovascular lifestyle strategies pertaining to stopping smoking, exercise, and diet.  We also discussed the benefits and drawbacks of starting aspirin and statin therapy, and he was agreeable to starting both.    Separately, I am a little concerned about Mr. Medina's chest pain given his aforementioned heavy smoking history and established coronary artery calcification, even though the pain is non-anginal in nature.  In order to formally quantify the coronary calcifications and evaluate for luminal coronary disease that may be causing  his chest pain, I will refer him for coronary CTA.  I had an extensive discussion with him about the nature of this test including its  benefits and drawbacks. I specifically detailed the drawbacks of contrast exposure, radiation exposure, the need for possible invasive coronary angiography to confirm results, and the possibility of incidental findings on noncoronary portion of exam.  He was agreeable with continuing. Although his CAC is severe, I think that it is scattered enough that a luminal evaluation should be possible.      Problems:  1. Coronary artery disease, diagnosed as CAC on lung cancer screening CT in 07/2021  2. Current smoking  3. Obesity    Plan:  - Start ASA 81 mg a24h and atorvastatin 40 mg q24h  - Coronary CTA to quantify CAC and determine degree of luminal stenosis     Chart review time: 20  Visit time: 40  Total time spent: 60  >50% of this 60-minute visit were spent with the patient on in-person counseling and discussion regarding CAC/CAD ,smoking, obesity.     Ramiro Costa [facility], [place]    Cardiovascular Division  Pager [phone]    CC  Patient Care Team:  Piter Maynard PA-C as PCP - General (Physician Assistant)  Piter Maynard PA-C as Assigned PCP

## 2021-08-31 ENCOUNTER — TELEPHONE (OUTPATIENT)
Dept: CARDIOLOGY | Facility: CLINIC | Age: 56
End: 2021-08-31

## 2021-09-01 ENCOUNTER — OFFICE VISIT (OUTPATIENT)
Dept: CARDIOLOGY | Facility: CLINIC | Age: 56
End: 2021-09-01
Attending: PHYSICIAN ASSISTANT
Payer: COMMERCIAL

## 2021-09-01 VITALS
OXYGEN SATURATION: 96 % | BODY MASS INDEX: 34.75 KG/M2 | DIASTOLIC BLOOD PRESSURE: 83 MMHG | WEIGHT: 235.3 LBS | HEART RATE: 62 BPM | SYSTOLIC BLOOD PRESSURE: 132 MMHG

## 2021-09-01 DIAGNOSIS — I25.10 CORONARY ARTERY CALCIFICATION SEEN ON CAT SCAN: ICD-10-CM

## 2021-09-01 DIAGNOSIS — Z82.49 FAMILY HISTORY OF ISCHEMIC HEART DISEASE: ICD-10-CM

## 2021-09-01 PROCEDURE — 93000 ELECTROCARDIOGRAM COMPLETE: CPT | Performed by: STUDENT IN AN ORGANIZED HEALTH CARE EDUCATION/TRAINING PROGRAM

## 2021-09-01 PROCEDURE — 99205 OFFICE O/P NEW HI 60 MIN: CPT | Performed by: STUDENT IN AN ORGANIZED HEALTH CARE EDUCATION/TRAINING PROGRAM

## 2021-09-01 RX ORDER — ATORVASTATIN CALCIUM 40 MG/1
40 TABLET, FILM COATED ORAL DAILY
Qty: 90 TABLET | Refills: 3 | Status: ON HOLD | OUTPATIENT
Start: 2021-09-01 | End: 2021-11-06

## 2021-09-01 NOTE — PATIENT INSTRUCTIONS
You were seen at the [facility] Cardiology clinic today.   You saw Dr. Ramiro Costa, [facility], [place].    The following is a summary of your office visit today:    1. Start aspirin 81 mg once daily and atorvastatin 40 mg once daily  2. Get coronary CT (CT of heart's arteries) to evaluate for blockages in 1-2 months   3. Follow-up with me in three months after CT     Nurse contact information:    Cardiology Care Coordinators: Luann Hesetr RN and Eleonora Busch RN      Phone: [phone]   Fax: [phone]      HOW TO CHECK YOUR BLOOD PRESSURE AT HOME:     Avoid eating, smoking, and exercising for at least 30 minutes before taking a reading.    Be sure you have taken your BP medication at least 2-3 hours before you check it.     Sit quietly for 10 minutes before a reading.     Sit in a chair with your feet flat on the floor. Rest your  arm on a table so that the arm cuff is at the same level as your heart.    Remain still during the reading.    Record your blood pressure and pulse in a log and bring to your next appointment.     If you have had any blood work, imaging or other testing completed we will be in touch within 1-2 weeks regarding the results. If you have any questions, concerns or need to schedule a follow up, please contact us at [phone]. If you are needing refills please contact your pharmacy. For urgent after hour care please call the [facility] at [phone] or the [facility] at [phone] and ask to speak to the on-call Cardiologist.    It was a pleasure meeting with you today. Please let us know if there is anything else we can do for you so that we can be sure you are leaving completely satisfied with your care experience.     Your Cardiology Team at [facility]  RN Care Coordinators: Ralf ALFONSO: Natty

## 2021-10-13 ENCOUNTER — HOSPITAL ENCOUNTER (OUTPATIENT)
Dept: CT IMAGING | Facility: CLINIC | Age: 56
End: 2021-10-13
Attending: STUDENT IN AN ORGANIZED HEALTH CARE EDUCATION/TRAINING PROGRAM
Payer: COMMERCIAL

## 2021-10-13 ENCOUNTER — HOSPITAL ENCOUNTER (OUTPATIENT)
Facility: CLINIC | Age: 56
Setting detail: OBSERVATION
Discharge: HOME | End: 2021-10-13
Attending: EMERGENCY MEDICINE | Admitting: INTERNAL MEDICINE
Payer: COMMERCIAL

## 2021-10-13 ENCOUNTER — APPOINTMENT (OUTPATIENT)
Dept: CARDIOLOGY | Facility: CLINIC | Age: 56
End: 2021-10-13
Attending: PHYSICIAN ASSISTANT
Payer: COMMERCIAL

## 2021-10-13 VITALS
HEART RATE: 57 BPM | SYSTOLIC BLOOD PRESSURE: 123 MMHG | OXYGEN SATURATION: 97 % | DIASTOLIC BLOOD PRESSURE: 83 MMHG | RESPIRATION RATE: 16 BRPM

## 2021-10-13 VITALS
HEART RATE: 81 BPM | TEMPERATURE: 98.4 F | SYSTOLIC BLOOD PRESSURE: 137 MMHG | OXYGEN SATURATION: 97 % | RESPIRATION RATE: 16 BRPM | DIASTOLIC BLOOD PRESSURE: 79 MMHG

## 2021-10-13 DIAGNOSIS — I25.10 CORONARY ARTERY CALCIFICATION SEEN ON CAT SCAN: ICD-10-CM

## 2021-10-13 DIAGNOSIS — I20.0 UNSTABLE ANGINA (H): ICD-10-CM

## 2021-10-13 DIAGNOSIS — I25.110 ATHEROSCLEROSIS OF NATIVE CORONARY ARTERY WITH UNSTABLE ANGINA PECTORIS, UNSPECIFIED WHETHER NATIVE OR TRANSPLANTED HEART (H): ICD-10-CM

## 2021-10-13 DIAGNOSIS — I25.118 CORONARY ARTERY DISEASE OF NATIVE ARTERY OF NATIVE HEART WITH STABLE ANGINA PECTORIS (H): Primary | ICD-10-CM

## 2021-10-13 DIAGNOSIS — F17.210 CIGARETTE SMOKER: ICD-10-CM

## 2021-10-13 DIAGNOSIS — Z11.52 ENCOUNTER FOR SCREENING LABORATORY TESTING FOR SEVERE ACUTE RESPIRATORY SYNDROME CORONAVIRUS 2 (SARS-COV-2): ICD-10-CM

## 2021-10-13 LAB
ACT BLD: 263 SECONDS (ref 74–150)
ACT BLD: 276 SECONDS (ref 74–150)
ACT BLD: 308 SECONDS (ref 74–150)
ALBUMIN SERPL-MCNC: 3.9 G/DL (ref 3.4–5)
ALP SERPL-CCNC: 92 U/L (ref 40–150)
ALT SERPL W P-5'-P-CCNC: 37 U/L (ref 0–70)
ANION GAP SERPL CALCULATED.3IONS-SCNC: 4 MMOL/L (ref 3–14)
AST SERPL W P-5'-P-CCNC: 40 U/L (ref 0–45)
ATRIAL RATE - MUSE: 57 BPM
BASOPHILS # BLD AUTO: 0.1 10E3/UL (ref 0–0.2)
BASOPHILS NFR BLD AUTO: 1 %
BILIRUB SERPL-MCNC: 1.7 MG/DL (ref 0.2–1.3)
BUN SERPL-MCNC: 8 MG/DL (ref 7–30)
CALCIUM SERPL-MCNC: 8.7 MG/DL (ref 8.5–10.1)
CHLORIDE BLD-SCNC: 101 MMOL/L (ref 94–109)
CHOLEST SERPL-MCNC: 147 MG/DL
CO2 SERPL-SCNC: 31 MMOL/L (ref 20–32)
CREAT BLD-MCNC: 1.1 MG/DL (ref 0.7–1.3)
CREAT SERPL-MCNC: 0.87 MG/DL (ref 0.66–1.25)
DIASTOLIC BLOOD PRESSURE - MUSE: NORMAL MMHG
EOSINOPHIL # BLD AUTO: 0.3 10E3/UL (ref 0–0.7)
EOSINOPHIL NFR BLD AUTO: 3 %
ERYTHROCYTE [DISTWIDTH] IN BLOOD BY AUTOMATED COUNT: 13.1 % (ref 10–15)
GFR SERPL CREATININE-BSD FRML MDRD: >60 ML/MIN/1.73M2
GFR SERPL CREATININE-BSD FRML MDRD: >90 ML/MIN/1.73M2
GLUCOSE BLD-MCNC: 94 MG/DL (ref 70–99)
HBA1C MFR BLD: 5.9 % (ref 0–5.6)
HCT VFR BLD AUTO: 48.7 % (ref 40–53)
HDLC SERPL-MCNC: 44 MG/DL
HGB BLD-MCNC: 16.7 G/DL (ref 13.3–17.7)
HOLD SPECIMEN: NORMAL
IMM GRANULOCYTES # BLD: 0.1 10E3/UL
IMM GRANULOCYTES NFR BLD: 1 %
INTERPRETATION ECG - MUSE: NORMAL
LDLC SERPL CALC-MCNC: 69 MG/DL
LVEF ECHO: NORMAL
LYMPHOCYTES # BLD AUTO: 1.9 10E3/UL (ref 0.8–5.3)
LYMPHOCYTES NFR BLD AUTO: 20 %
MCH RBC QN AUTO: 32.2 PG (ref 26.5–33)
MCHC RBC AUTO-ENTMCNC: 34.3 G/DL (ref 31.5–36.5)
MCV RBC AUTO: 94 FL (ref 78–100)
MONOCYTES # BLD AUTO: 0.6 10E3/UL (ref 0–1.3)
MONOCYTES NFR BLD AUTO: 6 %
NEUTROPHILS # BLD AUTO: 6.7 10E3/UL (ref 1.6–8.3)
NEUTROPHILS NFR BLD AUTO: 69 %
NONHDLC SERPL-MCNC: 103 MG/DL
NRBC # BLD AUTO: 0 10E3/UL
NRBC BLD AUTO-RTO: 0 /100
P AXIS - MUSE: 25 DEGREES
PLATELET # BLD AUTO: 271 10E3/UL (ref 150–450)
POTASSIUM BLD-SCNC: 4.6 MMOL/L (ref 3.4–5.3)
PR INTERVAL - MUSE: 208 MS
PROT SERPL-MCNC: 7.6 G/DL (ref 6.8–8.8)
QRS DURATION - MUSE: 100 MS
QT - MUSE: 414 MS
QTC - MUSE: 402 MS
R AXIS - MUSE: 74 DEGREES
RBC # BLD AUTO: 5.19 10E6/UL (ref 4.4–5.9)
SARS-COV-2 RNA RESP QL NAA+PROBE: NEGATIVE
SODIUM SERPL-SCNC: 136 MMOL/L (ref 133–144)
SYSTOLIC BLOOD PRESSURE - MUSE: NORMAL MMHG
T AXIS - MUSE: 30 DEGREES
TRIGL SERPL-MCNC: 170 MG/DL
TROPONIN I SERPL-MCNC: <0.015 UG/L (ref 0–0.04)
VENTRICULAR RATE- MUSE: 57 BPM
WBC # BLD AUTO: 9.6 10E3/UL (ref 4–11)

## 2021-10-13 PROCEDURE — 99153 MOD SED SAME PHYS/QHP EA: CPT | Performed by: INTERNAL MEDICINE

## 2021-10-13 PROCEDURE — G0378 HOSPITAL OBSERVATION PER HR: HCPCS

## 2021-10-13 PROCEDURE — 99285 EMERGENCY DEPT VISIT HI MDM: CPT | Mod: 25 | Performed by: EMERGENCY MEDICINE

## 2021-10-13 PROCEDURE — 250N000011 HC RX IP 250 OP 636: Performed by: INTERNAL MEDICINE

## 2021-10-13 PROCEDURE — 250N000013 HC RX MED GY IP 250 OP 250 PS 637: Performed by: INTERNAL MEDICINE

## 2021-10-13 PROCEDURE — C1725 CATH, TRANSLUMIN NON-LASER: HCPCS | Performed by: INTERNAL MEDICINE

## 2021-10-13 PROCEDURE — 75574 CT ANGIO HRT W/3D IMAGE: CPT

## 2021-10-13 PROCEDURE — C1769 GUIDE WIRE: HCPCS | Performed by: INTERNAL MEDICINE

## 2021-10-13 PROCEDURE — 83036 HEMOGLOBIN GLYCOSYLATED A1C: CPT | Performed by: PHYSICIAN ASSISTANT

## 2021-10-13 PROCEDURE — 272N000002 HC OR SUPPLY OTHER OPNP: Performed by: INTERNAL MEDICINE

## 2021-10-13 PROCEDURE — 82565 ASSAY OF CREATININE: CPT

## 2021-10-13 PROCEDURE — 84484 ASSAY OF TROPONIN QUANT: CPT | Performed by: EMERGENCY MEDICINE

## 2021-10-13 PROCEDURE — 93010 ELECTROCARDIOGRAM REPORT: CPT | Performed by: INTERNAL MEDICINE

## 2021-10-13 PROCEDURE — 214N000001 HC R&B CCU UMMC

## 2021-10-13 PROCEDURE — 999N000054 HC STATISTIC EKG NON-CHARGEABLE

## 2021-10-13 PROCEDURE — 75574 CT ANGIO HRT W/3D IMAGE: CPT | Mod: 26 | Performed by: INTERNAL MEDICINE

## 2021-10-13 PROCEDURE — 85025 COMPLETE CBC W/AUTO DIFF WBC: CPT | Performed by: EMERGENCY MEDICINE

## 2021-10-13 PROCEDURE — 99236 HOSP IP/OBS SAME DATE HI 85: CPT | Mod: 25 | Performed by: INTERNAL MEDICINE

## 2021-10-13 PROCEDURE — 250N000009 HC RX 250: Performed by: INTERNAL MEDICINE

## 2021-10-13 PROCEDURE — 93005 ELECTROCARDIOGRAM TRACING: CPT | Performed by: EMERGENCY MEDICINE

## 2021-10-13 PROCEDURE — 80061 LIPID PANEL: CPT | Performed by: PHYSICIAN ASSISTANT

## 2021-10-13 PROCEDURE — 99152 MOD SED SAME PHYS/QHP 5/>YRS: CPT | Performed by: INTERNAL MEDICINE

## 2021-10-13 PROCEDURE — 85347 COAGULATION TIME ACTIVATED: CPT

## 2021-10-13 PROCEDURE — 272N000001 HC OR GENERAL SUPPLY STERILE: Performed by: INTERNAL MEDICINE

## 2021-10-13 PROCEDURE — 999N000054 HC STATISTIC EKG NON-CHARGEABLE: Performed by: EMERGENCY MEDICINE

## 2021-10-13 PROCEDURE — 93005 ELECTROCARDIOGRAM TRACING: CPT

## 2021-10-13 PROCEDURE — C1887 CATHETER, GUIDING: HCPCS | Performed by: INTERNAL MEDICINE

## 2021-10-13 PROCEDURE — C9600 PERC DRUG-EL COR STENT SING: HCPCS | Mod: LD | Performed by: INTERNAL MEDICINE

## 2021-10-13 PROCEDURE — 250N000013 HC RX MED GY IP 250 OP 250 PS 637: Performed by: EMERGENCY MEDICINE

## 2021-10-13 PROCEDURE — 93306 TTE W/DOPPLER COMPLETE: CPT | Mod: 26 | Performed by: INTERNAL MEDICINE

## 2021-10-13 PROCEDURE — 82040 ASSAY OF SERUM ALBUMIN: CPT | Performed by: EMERGENCY MEDICINE

## 2021-10-13 PROCEDURE — 93010 ELECTROCARDIOGRAM REPORT: CPT | Mod: 76 | Performed by: INTERNAL MEDICINE

## 2021-10-13 PROCEDURE — C9803 HOPD COVID-19 SPEC COLLECT: HCPCS | Performed by: EMERGENCY MEDICINE

## 2021-10-13 PROCEDURE — U0003 INFECTIOUS AGENT DETECTION BY NUCLEIC ACID (DNA OR RNA); SEVERE ACUTE RESPIRATORY SYNDROME CORONAVIRUS 2 (SARS-COV-2) (CORONAVIRUS DISEASE [COVID-19]), AMPLIFIED PROBE TECHNIQUE, MAKING USE OF HIGH THROUGHPUT TECHNOLOGIES AS DESCRIBED BY CMS-2020-01-R: HCPCS | Performed by: EMERGENCY MEDICINE

## 2021-10-13 PROCEDURE — C1761 HC OR CATH, TRANS INTRA LITHO/CORONARY: HCPCS | Performed by: INTERNAL MEDICINE

## 2021-10-13 PROCEDURE — 250N000013 HC RX MED GY IP 250 OP 250 PS 637: Performed by: PHYSICIAN ASSISTANT

## 2021-10-13 PROCEDURE — 93454 CORONARY ARTERY ANGIO S&I: CPT | Performed by: INTERNAL MEDICINE

## 2021-10-13 PROCEDURE — C9602 PERC D-E COR STENT ATHER S: HCPCS | Performed by: INTERNAL MEDICINE

## 2021-10-13 PROCEDURE — 36415 COLL VENOUS BLD VENIPUNCTURE: CPT | Performed by: EMERGENCY MEDICINE

## 2021-10-13 PROCEDURE — 999N000128 HC STATISTIC PERIPHERAL IV START W/O US GUIDANCE

## 2021-10-13 PROCEDURE — C1894 INTRO/SHEATH, NON-LASER: HCPCS | Performed by: INTERNAL MEDICINE

## 2021-10-13 PROCEDURE — 93458 L HRT ARTERY/VENTRICLE ANGIO: CPT | Mod: 59 | Performed by: INTERNAL MEDICINE

## 2021-10-13 PROCEDURE — C1874 STENT, COATED/COV W/DEL SYS: HCPCS | Performed by: INTERNAL MEDICINE

## 2021-10-13 PROCEDURE — 93306 TTE W/DOPPLER COMPLETE: CPT

## 2021-10-13 DEVICE — STENT CORONARY DES SYNERGY XD MR US 3.00X16MM H7493941816300: Type: IMPLANTABLE DEVICE | Status: FUNCTIONAL

## 2021-10-13 DEVICE — STENT CORONARY DES SYNERGY XD MR US 3.50X12MM H7493941812350: Type: IMPLANTABLE DEVICE | Status: FUNCTIONAL

## 2021-10-13 DEVICE — STENT CORONARY DES SYNERGY XD MR US 2.75X38MM H7493941838270: Type: IMPLANTABLE DEVICE | Status: FUNCTIONAL

## 2021-10-13 DEVICE — STENT CORONARY SYNERGY XD MR US 2.50X48MM H7493941848250: Type: IMPLANTABLE DEVICE | Status: FUNCTIONAL

## 2021-10-13 RX ORDER — IOPAMIDOL 755 MG/ML
120 INJECTION, SOLUTION INTRAVASCULAR ONCE
Status: COMPLETED | OUTPATIENT
Start: 2021-10-13 | End: 2021-10-13

## 2021-10-13 RX ORDER — LIDOCAINE 40 MG/G
CREAM TOPICAL
Status: DISCONTINUED | OUTPATIENT
Start: 2021-10-13 | End: 2021-10-13 | Stop reason: HOSPADM

## 2021-10-13 RX ORDER — ACETAMINOPHEN 650 MG/1
650 SUPPOSITORY RECTAL EVERY 4 HOURS PRN
Status: DISCONTINUED | OUTPATIENT
Start: 2021-10-13 | End: 2021-10-13 | Stop reason: HOSPADM

## 2021-10-13 RX ORDER — NICOTINE 21 MG/24HR
1 PATCH, TRANSDERMAL 24 HOURS TRANSDERMAL DAILY PRN
Status: DISCONTINUED | OUTPATIENT
Start: 2021-10-13 | End: 2021-10-13 | Stop reason: HOSPADM

## 2021-10-13 RX ORDER — ASPIRIN 81 MG/1
324 TABLET, CHEWABLE ORAL ONCE
Status: COMPLETED | OUTPATIENT
Start: 2021-10-13 | End: 2021-10-13

## 2021-10-13 RX ORDER — IVABRADINE 5 MG/1
5-15 TABLET, FILM COATED ORAL
Status: DISCONTINUED | OUTPATIENT
Start: 2021-10-13 | End: 2021-10-14 | Stop reason: HOSPADM

## 2021-10-13 RX ORDER — ONDANSETRON 4 MG/1
4 TABLET, ORALLY DISINTEGRATING ORAL EVERY 6 HOURS PRN
Status: DISCONTINUED | OUTPATIENT
Start: 2021-10-13 | End: 2021-10-13

## 2021-10-13 RX ORDER — HYDRALAZINE HYDROCHLORIDE 20 MG/ML
INJECTION INTRAMUSCULAR; INTRAVENOUS
Status: DISCONTINUED | OUTPATIENT
Start: 2021-10-13 | End: 2021-10-13 | Stop reason: HOSPADM

## 2021-10-13 RX ORDER — ONDANSETRON 4 MG/1
4 TABLET, ORALLY DISINTEGRATING ORAL EVERY 6 HOURS PRN
Status: DISCONTINUED | OUTPATIENT
Start: 2021-10-13 | End: 2021-10-13 | Stop reason: HOSPADM

## 2021-10-13 RX ORDER — ASPIRIN 81 MG/1
81 TABLET ORAL DAILY
Status: DISCONTINUED | OUTPATIENT
Start: 2021-10-13 | End: 2021-10-13

## 2021-10-13 RX ORDER — PROCHLORPERAZINE MALEATE 5 MG
10 TABLET ORAL EVERY 6 HOURS PRN
Status: DISCONTINUED | OUTPATIENT
Start: 2021-10-13 | End: 2021-10-13 | Stop reason: HOSPADM

## 2021-10-13 RX ORDER — ONDANSETRON 2 MG/ML
4 INJECTION INTRAMUSCULAR; INTRAVENOUS
Status: DISCONTINUED | OUTPATIENT
Start: 2021-10-13 | End: 2021-10-13

## 2021-10-13 RX ORDER — NITROGLYCERIN 0.4 MG/1
TABLET SUBLINGUAL
Qty: 25 TABLET | Refills: 11 | Status: SHIPPED | OUTPATIENT
Start: 2021-10-13

## 2021-10-13 RX ORDER — PROCHLORPERAZINE 25 MG
25 SUPPOSITORY, RECTAL RECTAL EVERY 12 HOURS PRN
Status: DISCONTINUED | OUTPATIENT
Start: 2021-10-13 | End: 2021-10-13 | Stop reason: HOSPADM

## 2021-10-13 RX ORDER — NITROGLYCERIN 0.4 MG/1
.4-.8 TABLET SUBLINGUAL
Status: DISCONTINUED | OUTPATIENT
Start: 2021-10-13 | End: 2021-10-13

## 2021-10-13 RX ORDER — SODIUM CHLORIDE 9 MG/ML
INJECTION, SOLUTION INTRAVENOUS CONTINUOUS
Status: DISCONTINUED | OUTPATIENT
Start: 2021-10-13 | End: 2021-10-13 | Stop reason: HOSPADM

## 2021-10-13 RX ORDER — NALOXONE HYDROCHLORIDE 0.4 MG/ML
0.2 INJECTION, SOLUTION INTRAMUSCULAR; INTRAVENOUS; SUBCUTANEOUS
Status: DISCONTINUED | OUTPATIENT
Start: 2021-10-13 | End: 2021-10-13 | Stop reason: HOSPADM

## 2021-10-13 RX ORDER — DOBUTAMINE HYDROCHLORIDE 200 MG/100ML
2-20 INJECTION INTRAVENOUS CONTINUOUS PRN
Status: DISCONTINUED | OUTPATIENT
Start: 2021-10-13 | End: 2021-10-13 | Stop reason: HOSPADM

## 2021-10-13 RX ORDER — ATROPINE SULFATE 0.1 MG/ML
0.5 INJECTION INTRAVENOUS
Status: DISCONTINUED | OUTPATIENT
Start: 2021-10-13 | End: 2021-10-13 | Stop reason: HOSPADM

## 2021-10-13 RX ORDER — ACYCLOVIR 200 MG/1
0-1 CAPSULE ORAL
Status: DISCONTINUED | OUTPATIENT
Start: 2021-10-13 | End: 2021-10-14 | Stop reason: HOSPADM

## 2021-10-13 RX ORDER — ACETAMINOPHEN 325 MG/1
650 TABLET ORAL EVERY 4 HOURS PRN
Status: DISCONTINUED | OUTPATIENT
Start: 2021-10-13 | End: 2021-10-13 | Stop reason: HOSPADM

## 2021-10-13 RX ORDER — TIROFIBAN HYDROCHLORIDE 50 UG/ML
0.07 INJECTION INTRAVENOUS CONTINUOUS PRN
Status: DISCONTINUED | OUTPATIENT
Start: 2021-10-13 | End: 2021-10-13 | Stop reason: HOSPADM

## 2021-10-13 RX ORDER — HEPARIN SODIUM 10000 [USP'U]/100ML
100-1000 INJECTION, SOLUTION INTRAVENOUS CONTINUOUS PRN
Status: DISCONTINUED | OUTPATIENT
Start: 2021-10-13 | End: 2021-10-13 | Stop reason: HOSPADM

## 2021-10-13 RX ORDER — DIPHENHYDRAMINE HYDROCHLORIDE 50 MG/ML
25-50 INJECTION INTRAMUSCULAR; INTRAVENOUS
Status: DISCONTINUED | OUTPATIENT
Start: 2021-10-13 | End: 2021-10-14 | Stop reason: HOSPADM

## 2021-10-13 RX ORDER — ONDANSETRON 2 MG/ML
4 INJECTION INTRAMUSCULAR; INTRAVENOUS EVERY 6 HOURS PRN
Status: DISCONTINUED | OUTPATIENT
Start: 2021-10-13 | End: 2021-10-13 | Stop reason: HOSPADM

## 2021-10-13 RX ORDER — ARGATROBAN 1 MG/ML
350 INJECTION, SOLUTION INTRAVENOUS
Status: DISCONTINUED | OUTPATIENT
Start: 2021-10-13 | End: 2021-10-13 | Stop reason: HOSPADM

## 2021-10-13 RX ORDER — IOPAMIDOL 755 MG/ML
INJECTION, SOLUTION INTRAVASCULAR
Status: DISCONTINUED | OUTPATIENT
Start: 2021-10-13 | End: 2021-10-13 | Stop reason: HOSPADM

## 2021-10-13 RX ORDER — NITROGLYCERIN 20 MG/100ML
10-200 INJECTION INTRAVENOUS CONTINUOUS PRN
Status: DISCONTINUED | OUTPATIENT
Start: 2021-10-13 | End: 2021-10-13 | Stop reason: HOSPADM

## 2021-10-13 RX ORDER — ASPIRIN 81 MG/1
81 TABLET ORAL DAILY
Status: DISCONTINUED | OUTPATIENT
Start: 2021-10-14 | End: 2021-10-13 | Stop reason: HOSPADM

## 2021-10-13 RX ORDER — NALOXONE HYDROCHLORIDE 0.4 MG/ML
0.4 INJECTION, SOLUTION INTRAMUSCULAR; INTRAVENOUS; SUBCUTANEOUS
Status: DISCONTINUED | OUTPATIENT
Start: 2021-10-13 | End: 2021-10-13 | Stop reason: HOSPADM

## 2021-10-13 RX ORDER — DOPAMINE HYDROCHLORIDE 160 MG/100ML
2-20 INJECTION, SOLUTION INTRAVENOUS CONTINUOUS PRN
Status: DISCONTINUED | OUTPATIENT
Start: 2021-10-13 | End: 2021-10-13 | Stop reason: HOSPADM

## 2021-10-13 RX ORDER — DIPHENHYDRAMINE HCL 25 MG
25 CAPSULE ORAL
Status: DISCONTINUED | OUTPATIENT
Start: 2021-10-13 | End: 2021-10-14 | Stop reason: HOSPADM

## 2021-10-13 RX ORDER — ONDANSETRON 2 MG/ML
4 INJECTION INTRAMUSCULAR; INTRAVENOUS EVERY 6 HOURS PRN
Status: DISCONTINUED | OUTPATIENT
Start: 2021-10-13 | End: 2021-10-13

## 2021-10-13 RX ORDER — ESCITALOPRAM OXALATE 10 MG/1
10 TABLET ORAL DAILY
Status: DISCONTINUED | OUTPATIENT
Start: 2021-10-14 | End: 2021-10-13 | Stop reason: HOSPADM

## 2021-10-13 RX ORDER — NITROGLYCERIN 0.4 MG/1
0.4 TABLET SUBLINGUAL EVERY 5 MIN PRN
Status: DISCONTINUED | OUTPATIENT
Start: 2021-10-13 | End: 2021-10-13

## 2021-10-13 RX ORDER — FLUMAZENIL 0.1 MG/ML
0.2 INJECTION, SOLUTION INTRAVENOUS
Status: DISCONTINUED | OUTPATIENT
Start: 2021-10-13 | End: 2021-10-13 | Stop reason: HOSPADM

## 2021-10-13 RX ORDER — METOPROLOL TARTRATE 1 MG/ML
5 INJECTION, SOLUTION INTRAVENOUS
Status: DISCONTINUED | OUTPATIENT
Start: 2021-10-13 | End: 2021-10-13 | Stop reason: HOSPADM

## 2021-10-13 RX ORDER — OXYCODONE HYDROCHLORIDE 5 MG/1
5 TABLET ORAL EVERY 4 HOURS PRN
Status: DISCONTINUED | OUTPATIENT
Start: 2021-10-13 | End: 2021-10-13 | Stop reason: HOSPADM

## 2021-10-13 RX ORDER — HEPARIN SODIUM 1000 [USP'U]/ML
INJECTION, SOLUTION INTRAVENOUS; SUBCUTANEOUS
Status: DISCONTINUED | OUTPATIENT
Start: 2021-10-13 | End: 2021-10-13 | Stop reason: HOSPADM

## 2021-10-13 RX ORDER — POLYETHYLENE GLYCOL 3350 17 G/17G
17 POWDER, FOR SOLUTION ORAL DAILY PRN
Status: DISCONTINUED | OUTPATIENT
Start: 2021-10-13 | End: 2021-10-13 | Stop reason: HOSPADM

## 2021-10-13 RX ORDER — ATORVASTATIN CALCIUM 40 MG/1
40 TABLET, FILM COATED ORAL DAILY
Status: DISCONTINUED | OUTPATIENT
Start: 2021-10-13 | End: 2021-10-13 | Stop reason: HOSPADM

## 2021-10-13 RX ORDER — ARGATROBAN 1 MG/ML
150 INJECTION, SOLUTION INTRAVENOUS
Status: DISCONTINUED | OUTPATIENT
Start: 2021-10-13 | End: 2021-10-13 | Stop reason: HOSPADM

## 2021-10-13 RX ORDER — OXYCODONE HYDROCHLORIDE 10 MG/1
10 TABLET ORAL EVERY 4 HOURS PRN
Status: DISCONTINUED | OUTPATIENT
Start: 2021-10-13 | End: 2021-10-13 | Stop reason: HOSPADM

## 2021-10-13 RX ORDER — ASPIRIN 81 MG/1
81 TABLET, CHEWABLE ORAL ONCE
Status: DISCONTINUED | OUTPATIENT
Start: 2021-10-13 | End: 2021-10-13 | Stop reason: CLARIF

## 2021-10-13 RX ORDER — METOPROLOL TARTRATE 1 MG/ML
5-15 INJECTION, SOLUTION INTRAVENOUS
Status: DISCONTINUED | OUTPATIENT
Start: 2021-10-13 | End: 2021-10-13

## 2021-10-13 RX ORDER — NITROGLYCERIN 5 MG/ML
VIAL (ML) INTRAVENOUS
Status: DISCONTINUED | OUTPATIENT
Start: 2021-10-13 | End: 2021-10-13 | Stop reason: HOSPADM

## 2021-10-13 RX ORDER — FENTANYL CITRATE 50 UG/ML
25 INJECTION, SOLUTION INTRAMUSCULAR; INTRAVENOUS
Status: DISCONTINUED | OUTPATIENT
Start: 2021-10-13 | End: 2021-10-13 | Stop reason: HOSPADM

## 2021-10-13 RX ORDER — EPTIFIBATIDE 2 MG/ML
180 INJECTION, SOLUTION INTRAVENOUS EVERY 10 MIN PRN
Status: DISCONTINUED | OUTPATIENT
Start: 2021-10-13 | End: 2021-10-13 | Stop reason: HOSPADM

## 2021-10-13 RX ORDER — MAGNESIUM HYDROXIDE/ALUMINUM HYDROXICE/SIMETHICONE 120; 1200; 1200 MG/30ML; MG/30ML; MG/30ML
30 SUSPENSION ORAL EVERY 4 HOURS PRN
Status: DISCONTINUED | OUTPATIENT
Start: 2021-10-13 | End: 2021-10-13 | Stop reason: HOSPADM

## 2021-10-13 RX ORDER — HYDRALAZINE HYDROCHLORIDE 20 MG/ML
10 INJECTION INTRAMUSCULAR; INTRAVENOUS EVERY 4 HOURS PRN
Status: DISCONTINUED | OUTPATIENT
Start: 2021-10-13 | End: 2021-10-13 | Stop reason: HOSPADM

## 2021-10-13 RX ORDER — NITROGLYCERIN 0.4 MG/1
0.4 TABLET SUBLINGUAL EVERY 5 MIN PRN
Status: DISCONTINUED | OUTPATIENT
Start: 2021-10-13 | End: 2021-10-13 | Stop reason: HOSPADM

## 2021-10-13 RX ORDER — ACETAMINOPHEN 325 MG/1
650 TABLET ORAL EVERY 4 HOURS PRN
Status: DISCONTINUED | OUTPATIENT
Start: 2021-10-13 | End: 2021-10-13

## 2021-10-13 RX ORDER — ASPIRIN 81 MG/1
81 TABLET ORAL DAILY
Status: DISCONTINUED | OUTPATIENT
Start: 2021-10-14 | End: 2021-10-13

## 2021-10-13 RX ORDER — NICARDIPINE HYDROCHLORIDE 2.5 MG/ML
INJECTION INTRAVENOUS
Status: DISCONTINUED | OUTPATIENT
Start: 2021-10-13 | End: 2021-10-13 | Stop reason: HOSPADM

## 2021-10-13 RX ORDER — FENTANYL CITRATE 50 UG/ML
INJECTION, SOLUTION INTRAMUSCULAR; INTRAVENOUS
Status: DISCONTINUED | OUTPATIENT
Start: 2021-10-13 | End: 2021-10-13 | Stop reason: HOSPADM

## 2021-10-13 RX ORDER — METHYLPREDNISOLONE SODIUM SUCCINATE 125 MG/2ML
125 INJECTION, POWDER, LYOPHILIZED, FOR SOLUTION INTRAMUSCULAR; INTRAVENOUS
Status: DISCONTINUED | OUTPATIENT
Start: 2021-10-13 | End: 2021-10-14 | Stop reason: HOSPADM

## 2021-10-13 RX ORDER — EPTIFIBATIDE 2 MG/ML
1 INJECTION, SOLUTION INTRAVENOUS CONTINUOUS PRN
Status: DISCONTINUED | OUTPATIENT
Start: 2021-10-13 | End: 2021-10-13 | Stop reason: HOSPADM

## 2021-10-13 RX ORDER — METOPROLOL TARTRATE 25 MG/1
25-100 TABLET, FILM COATED ORAL
Status: COMPLETED | OUTPATIENT
Start: 2021-10-13 | End: 2021-10-13

## 2021-10-13 RX ADMIN — IOPAMIDOL 120 ML: 755 INJECTION, SOLUTION INTRAVENOUS at 09:23

## 2021-10-13 RX ADMIN — ASPIRIN 81 MG CHEWABLE TABLET 324 MG: 81 TABLET CHEWABLE at 12:01

## 2021-10-13 RX ADMIN — NITROGLYCERIN 0.8 MG: 0.4 TABLET SUBLINGUAL at 09:27

## 2021-10-13 RX ADMIN — ATORVASTATIN CALCIUM 40 MG: 40 TABLET, FILM COATED ORAL at 14:16

## 2021-10-13 RX ADMIN — METOPROLOL TARTRATE 50 MG: 50 TABLET, FILM COATED ORAL at 08:10

## 2021-10-13 ASSESSMENT — ENCOUNTER SYMPTOMS
NAUSEA: 0
FEVER: 0
VOMITING: 0
DIARRHEA: 0
CHILLS: 0
ABDOMINAL PAIN: 0
SHORTNESS OF BREATH: 1

## 2021-10-13 NOTE — ED TRIAGE NOTES
Patient awake and alert. Respirations even and unlabored. Skin warm and dry. Chest pain 5/10. Chest tightness intermittently over the last 2 weeks and worsening last night becoming painful. Patient came today for a CTA coronary, and found to have multivessel disease. Sent to the emergency room for further work up.

## 2021-10-13 NOTE — Clinical Note
The first balloon was inserted into the circumflex and middle circumflex.Max pressure = 14 serena. Total duration = 10 seconds.

## 2021-10-13 NOTE — DISCHARGE INSTRUCTIONS
Going home after a Coronary Angiogram with Stent Placement    PROCEDURE SITE:  It is normal to have soreness and small bruising at the puncture site as well as mild tingling in your hand for up to 3 days. You may shower but please do not use a hot tub, bath tub or pool for 2 days. Do not apply any lotion or powder near the site for 3 days. For 3 days do not use your affected hand/arm to support your weight (like rising up out of a chair) or lifting >5 pounds.    MEDICATIONS:  1. You have begun Brilinta (ticagrelor). This medication is essential for your stent(s). Do not stop it without speaking first to your heart doctor or their nurse- this includes if you have concerns about side effects or cost. Also, if another health provider tells you to stop it, let them know they need to discuss it with your heart doctor.   2. If you are on Metformin (Glucophage) or any medications that contain this, you should not take it for at least 48 hours (2 days) from the time of your procedure. If you have baseline kidney problems, you may be instructed to also have a lab test drawn in 2-3 days before getting the okay to restart it.  3. If you have not been continued on other medications you were previously taking at home it is probably for reason though please discuss your concerns with any of your doctors.     DIET:  We recommend a diet low in saturated fat, trans fat and cholesterol. In addition it will be helpful to be cautious of sodium intake and carbohydrates. Try to increase the amount of lean meats you eat like fish and chicken, but avoid frying; and reduce the amount of red meat you eat. Eat more fresh fruits and vegetables and try to avoid canned and processed food. Please reference the handouts you received for more specific information.    CARDIAC REHAB:  After the initial healing process of the procedure site, we recommend cardiac rehabilitation for all heart attack and stent patients. Cardiac rehabilitation will help  you:  - Rebuild stamina, strength and balance.  - Learn how to participate in activities safely, as well as help you regain confidence to do so.  - Return to activities of daily living and leisure.  You should receive a call from them within the next week, but if you do not or you would like to call you can contact their central scheduling at [phone]    OTHER INFORMATION:  1. If you are a smoker, quitting smoking will be one of the most important things you can do for yourself. There are nicotine replacement options or medications they might be able to be prescribed. Please discuss this with your doctors. Consider calling the [employer]uit[employer] at [phone] ([phone]) as they can offer ongoing support after discharge.    CALL YOUR DOCTOR IF:  -You have a large or growing lump/bump around the procedure site  -The site is red, swollen, hot, tender or has drainage  -You have hives, a rash or unusual itching  -You have increasing or worsening shortness of breath or chest pain    FOLLOW UP:  We prefer you to follow up with your primary care provider within one week. You will be arranged to return to cath lab for staged intervention of another vessel in your heart. Our  will call you with this time. Please be sure to take all your medications the morning of your procedure, nothing to eat or drink 6 hours prior to procedure.     Should you need to contact us:  Cardiology clinic for scheduling or triage nurse questions/concerns:  [phone]

## 2021-10-13 NOTE — Clinical Note
The first balloon was inserted into the left anterior descending and proximal left anterior descending.Max pressure = 16 serena. Total duration = 10 seconds.     Max pressure = 16 serena. Total duration = 10 seconds.    Balloon reinflated a second time: Max pressure = 16 serena. Total duration = 10 seconds. Max pressure = 16 serena. Total duration = 10 seconds.  Balloon reinflated a fourth time: Max pressure = 16 serena. Total duration = 10 seconds.

## 2021-10-13 NOTE — Clinical Note
Stent deployed in the middle left anterior descending. Max pressure = 12 serena. Total duration = 10 seconds.

## 2021-10-13 NOTE — PROGRESS NOTES
Pt here for CTA coronary. Pt arrived with 5/10 chest pain. Informed Dr. Cortes who advised to proceed with the CTA. EKG was done and was reviewed with Dr. Cortes. Pt was given Metoprolol 50 mg po and Nitroglycerin 0.8mg sublingual for the CTA coronary. Pt tolerated the test. Per Dr. Cortes pt is to go to the ER for further evaluation based on the results of the CTA coronary and intermittent chest pain. Pt brought to ER in a wheelchair for further workup.

## 2021-10-13 NOTE — ED PROVIDER NOTES
[place] EMERGENCY DEPARTMENT ([facility])  10/13/21  ED 5  11:43 AM   History     Chief Complaint   Patient presents with     Chest Pain     The history is provided by the patient, medical records and the spouse.     Harjit Medina is a 56 year old male with history of obstructive sleep apnea, recently diagnosed coronary artery calcifications who presents from radiology for further evaluation of chest pain.  He had a lung cancer screening exam that showed extensive coronary artery calcifications.  Given his family history of ischemic heart disease as well as his known history of smoking, he was scheduled to have a CT coronary angiogram today.  He had presented to radiology clinic and there reported 5/10 chest pain.  He had an EKG performed and they continue with the CTA.   Based on results of the CTA and the chest pain he was sent to the ED for further evaluation of unstable angina. CTA results are pasted below, notable for severe obstructive three-vessel CAD.  He did receive 50 mg of metoprolol and 0.8 mg of nitroglycerin sublingual prior to arrival.  He is on daily Aspirin, but did not take it today. He states his chest pain was bad last night. Today the chest pain is more of a chest pressure, but does spike when he gets up from laying down. The discomfort is ongoing at this time. No fevers, chills, cough, abdominal pain, diarrhea. He is accompanied by wife who noted that Cardiology suspected that his calcification might be too extensive for stenting. Patient has not eaten since yesterday. He is on 81 mg aspirin, but has not taken it today.     CTA ANGIOGRAM CORONARY ARTERY   Examination Date: 10/13/2021 10:09 AM   Indication: Chest tightness; Coronary artery calcification seen on CAT scan   IMPRESSION:  1.  Severe obstructive three-vessel CAD, not including left main. Patient sent to the ED due to unstable angina.  2.  Total Agatston score 1382 placing the patient in the 99 percentile when  compared to age and gender matched control group.  3.  Please review Radiology report for incidental noncardiac findings that will follow separately.       I have reviewed the Medications, Allergies, Past Medical and Surgical History, and Social History in the [employer] system.    PAST MEDICAL HISTORY:   Past Medical History:   Diagnosis Date     NAYELY (generalized anxiety disorder)      Sleep apnea     cpap       PAST SURGICAL HISTORY:   Past Surgical History:   Procedure Laterality Date     COLONOSCOPY N/A 1/4/2018    Procedure: COMBINED COLONOSCOPY, SINGLE OR MULTIPLE BIOPSY/POLYPECTOMY BY BIOPSY;;  Surgeon: Leeroy Jacobsen MD;  Location: [facility] OR     COLONOSCOPY WITH CO2 INSUFFLATION N/A 1/4/2018    Procedure: COLONOSCOPY WITH CO2 INSUFFLATION;  COLON-SCREENING / LIMON ;  Surgeon: Leeroy Jacobsen MD;  Location: [facility] OR       Past medical history, past surgical history, medications, and allergies were reviewed with the patient. Additional pertinent items: None    FAMILY HISTORY:   Family History   Problem Relation Age of Onset     Dementia Mother      Other Cancer Father         brain      Diabetes Brother        SOCIAL HISTORY:   Social History     Tobacco Use     Smoking status: Current Every Day Smoker     Packs/day: 1.00     Types: Cigarettes     Smokeless tobacco: Current User     Last attempt to quit: 9/1/2018   Substance Use Topics     Alcohol use: Yes     Comment: occ -        Social history was reviewed with the patient. Additional pertinent items: None    Patient's Medications   New Prescriptions    No medications on file   Previous Medications    ASPIRIN (ASA) 81 MG EC TABLET    Take 1 tablet (81 mg) by mouth daily    ATORVASTATIN (LIPITOR) 40 MG TABLET    Take 1 tablet (40 mg) by mouth daily    ESCITALOPRAM (LEXAPRO) 10 MG TABLET    Take 1/2 tab daily for 1 week, then increase to 1 full tab daily thereafter.    ORDER FOR DME    Equipment being ordered: CPAP and all necessary supplies    SILDENAFIL  (REVATIO) 20 MG TABLET    2-5 tabs 1/2 to 4 hours prior to relations    TRIMETHOPRIM-POLYMYXIN B (POLYTRIM) 31024-2.1 UNIT/ML-% OPHTHALMIC SOLUTION    Place 1-2 drops into both eyes every 4 hours    VARENICLINE (CHANTIX AL) 0.5 MG X 11 & 1 MG X 42 TABLET    Take 0.5 mg tab daily for 3 days, THEN 0.5 mg tab twice daily for 4 days, THEN 1 mg twice daily.    VARENICLINE (CHANTIX) 1 MG TABLET    Take 1 tablet (1 mg) by mouth 2 times daily   Modified Medications    No medications on file   Discontinued Medications    No medications on file        No Known Allergies     Review of Systems   Constitutional: Negative for chills and fever.   Respiratory: Positive for shortness of breath.    Cardiovascular: Positive for chest pain (chest discomfort, chest pressure).   Gastrointestinal: Negative for abdominal pain, diarrhea, nausea and vomiting.   All other systems reviewed and are negative.    A complete review of systems was performed with pertinent positives and negatives noted in the HPI, and all other systems negative.    Physical Exam   BP: (!) 150/95  Pulse: 56  Temp: 98.4  F (36.9  C)  Resp: 18  SpO2: 98 %      GEN:  Well developed, no acute distress  HEENT:  EOMI, Mucous membranes are moist.   Cardio:  RRR, no murmur, radial pulses equal bilaterally  PULM:  Lungs clear, good air movement, no wheezes, rales   Abd:  Soft, normal bowel sounds, no focal tenderness  Musculoskeletal:  normal range of motion, no lower extremity swelling or calf tenderness  Neuro:  Alert and oriented X3, Follows commands, moving all extremities spontaneously   Skin:  Warm, dry    ED Course        Procedures            EKG Interpretation:      Interpreted by Gladys Oh MD  Time reviewed: 10:50  Symptoms at time of EKG: chest pressure   Rhythm: Sinus bradycardia  Rate: 57  Axis: normal  Ectopy: none  Conduction: normal  ST Segments/ T Waves: No ST-T wave changes  Q Waves: none  Comparison to prior: Compared to EKG done earlier  today, the incomplete right bundle branch block is no longer seen.  No other changes    Clinical Impression: Sinus bradycardia          Lab results reviewed by myself and shown below:   Results for orders placed or performed during the hospital encounter of 10/13/21 (from the past 24 hour(s))   EKG 12 lead   Result Value Ref Range    Systolic Blood Pressure  mmHg    Diastolic Blood Pressure  mmHg    Ventricular Rate 57 BPM    Atrial Rate 57 BPM    OK Interval 208 ms    QRS Duration 100 ms     ms    QTc 402 ms    P Axis 25 degrees    R AXIS 74 degrees    T Axis 30 degrees    Interpretation ECG Sinus bradycardia  Otherwise normal ECG      [place] Draw    Narrative    The following orders were created for panel order [place] Draw.  Procedure                               Abnormality         Status                     ---------                               -----------         ------                     Extra Blue Top Tube[[id number]]                              Final result               Extra Red Top Tube[[id number]]                               Final result               Extra Green Top (Lithium...[[id number]]                      Final result               Extra Purple Top Tube[[id number]]                            Final result                 Please view results for these tests on the individual orders.   Extra Blue Top Tube   Result Value Ref Range    Hold Specimen JIC    Extra Red Top Tube   Result Value Ref Range    Hold Specimen JIC    Extra Green Top (Lithium Heparin) Tube   Result Value Ref Range    Hold Specimen JIC    Extra Purple Top Tube   Result Value Ref Range    Hold Specimen JIC    Comprehensive metabolic panel   Result Value Ref Range    Sodium 136 133 - 144 mmol/L    Potassium 4.6 3.4 - 5.3 mmol/L    Chloride 101 94 - 109 mmol/L    Carbon Dioxide (CO2) 31 20 - 32 mmol/L    Anion Gap 4 3 - 14 mmol/L    Urea Nitrogen 8 7 - 30 mg/dL    Creatinine 0.87 0.66 - 1.25 mg/dL    Calcium 8.7 8.5 - 10.1  mg/dL    Glucose 94 70 - 99 mg/dL    Alkaline Phosphatase 92 40 - 150 U/L    AST 40 0 - 45 U/L    ALT 37 0 - 70 U/L    Protein Total 7.6 6.8 - 8.8 g/dL    Albumin 3.9 3.4 - 5.0 g/dL    Bilirubin Total 1.7 (H) 0.2 - 1.3 mg/dL    GFR Estimate >90 >60 mL/min/1.73m2   CBC with platelets differential    Narrative    The following orders were created for panel order CBC with platelets differential.  Procedure                               Abnormality         Status                     ---------                               -----------         ------                     CBC with platelets and d...[[id number]]  Abnormal            Final result                 Please view results for these tests on the individual orders.   Troponin I   Result Value Ref Range    Troponin I <0.015 0.000 - 0.045 ug/L   CBC with platelets and differential   Result Value Ref Range    WBC Count 9.6 4.0 - 11.0 10e3/uL    RBC Count 5.19 4.40 - 5.90 10e6/uL    Hemoglobin 16.7 13.3 - 17.7 g/dL    Hematocrit 48.7 40.0 - 53.0 %    MCV 94 78 - 100 fL    MCH 32.2 26.5 - 33.0 pg    MCHC 34.3 31.5 - 36.5 g/dL    RDW 13.1 10.0 - 15.0 %    Platelet Count 271 150 - 450 10e3/uL    % Neutrophils 69 %    % Lymphocytes 20 %    % Monocytes 6 %    % Eosinophils 3 %    % Basophils 1 %    % Immature Granulocytes 1 %    NRBCs per 100 WBC 0 <1 /100    Absolute Neutrophils 6.7 1.6 - 8.3 10e3/uL    Absolute Lymphocytes 1.9 0.8 - 5.3 10e3/uL    Absolute Monocytes 0.6 0.0 - 1.3 10e3/uL    Absolute Eosinophils 0.3 0.0 - 0.7 10e3/uL    Absolute Basophils 0.1 0.0 - 0.2 10e3/uL    Absolute Immature Granulocytes 0.1 (H) <=0.0 10e3/uL    Absolute NRBCs 0.0 10e3/uL   Lipid panel reflex to direct LDL   Result Value Ref Range    Cholesterol 147 <200 mg/dL    Triglycerides 170 (H) <150 mg/dL    Direct Measure HDL 44 >=40 mg/dL    LDL Cholesterol Calculated 69 <=100 mg/dL    Non HDL Cholesterol 103 <130 mg/dL    Narrative    Cholesterol  Desirable:  <200  mg/dL    Triglycerides  Normal:  Less than 150 mg/dL  Borderline High:  150-199 mg/dL  High:  200-499 mg/dL  Very High:  Greater than or equal to 500 mg/dL    Direct Measure HDL  Female:  Greater than or equal to 50 mg/dL   Male:  Greater than or equal to 40 mg/dL    LDL Cholesterol  Desirable:  <100mg/dL  Above Desirable:  100-129 mg/dL   Borderline High:  130-159 mg/dL   High:  160-189 mg/dL   Very High:  >= 190 mg/dL    Non HDL Cholesterol  Desirable:  130 mg/dL  Above Desirable:  130-159 mg/dL  Borderline High:  160-189 mg/dL  High:  190-219 mg/dL  Very High:  Greater than or equal to 220 mg/dL   Hemoglobin A1c   Result Value Ref Range    Hemoglobin A1C 5.9 (H) 0.0 - 5.6 %       Patient was given:  Medications   lidocaine 1 % 0.1-1 mL (has no administration in time range)   lidocaine (LMX4) cream (has no administration in time range)   sodium chloride (PF) 0.9% PF flush 3 mL (has no administration in time range)   sodium chloride (PF) 0.9% PF flush 3 mL (has no administration in time range)   medication instruction (has no administration in time range)   nitroGLYcerin (NITROSTAT) sublingual tablet 0.4 mg (has no administration in time range)   alum & mag hydroxide-simethicone (MAALOX) suspension 30 mL (has no administration in time range)   acetaminophen (TYLENOL) tablet 650 mg (has no administration in time range)   acetaminophen (TYLENOL) Suppository 650 mg (has no administration in time range)   polyethylene glycol (MIRALAX) Packet 17 g (has no administration in time range)   ondansetron (ZOFRAN-ODT) ODT tab 4 mg (has no administration in time range)     Or   ondansetron (ZOFRAN) injection 4 mg (has no administration in time range)   prochlorperazine (COMPAZINE) injection 10 mg (has no administration in time range)     Or   prochlorperazine (COMPAZINE) tablet 10 mg (has no administration in time range)     Or   prochlorperazine (COMPAZINE) suppository 25 mg (has no administration in time range)   nicotine  Patch in Place ( Transdermal Patch Free Period 10/13/21 1331)   nicotine (NICODERM CQ) 21 MG/24HR 24 hr patch 1 patch (has no administration in time range)   atorvastatin (LIPITOR) tablet 40 mg (has no administration in time range)   escitalopram (LEXAPRO) tablet 10 mg (has no administration in time range)   aspirin EC tablet 81 mg (has no administration in time range)   aspirin (ASA) chewable tablet 324 mg (324 mg Oral Given 10/13/21 1201)     Patient was discussed with cardiology.  Patient will be admitted to the interventional cardiology service for likely angiogram in the near future.  The cardiology service is also ordered an echocardiogram which is being done in the ED at this time.  Patient otherwise has remained stable and reports that his chest discomfort is only a mild chest pressure, no ongoing chest pain.         Assessments & Plan (with Medical Decision Making)   Patient presents with chest pain that started last night and a CT coronary angiogram done earlier today that showing multivessel disease.  Patient will likely need coronary angiogram, possible stenting.  He will be admitted to the interventional cardiology service for further evaluation and treatment.  At this time, the radiology consult regarding the CT chest images is not yet available.  The cardiology report regarding the CT coronary angiogram does comment that the ascending aorta is normal in size.  There is normal pulmonary venous anatomy of all 4 pulmonary veins draining in the left atrium.  No sign of aortic dissection.  Doubt PE given the intermittent nature of the chest discomfort.    I have reviewed the nursing notes.    I have reviewed the findings, diagnosis, plan and need for follow up with the patient.    New Prescriptions    No medications on file       Final diagnoses:   Unstable angina (H)     I, Nydia Choudhury, am serving as a trained medical scribe to document services personally performed by Gladys Oh MD based  on the provider's statements to me on October 13, 2021.  This document has been checked and approved by the attending provider.    I, Gladys Oh MD, was physically present and have reviewed and verified the accuracy of this note documented by Nydia Choudhury, medical scribe.      Gladys Oh MD     10/13/2021   [facility] EMERGENCY DEPARTMENT     Gladys Oh MD  10/13/21 [id number]

## 2021-10-13 NOTE — Clinical Note
The first balloon was inserted into the circumflex and proximal circumflex.Max pressure = 14 serena. Total duration = 10 seconds.

## 2021-10-13 NOTE — Clinical Note
Stent deployed in the middle left anterior descending. Max pressure = 14 serena. Total duration = 10 seconds.

## 2021-10-13 NOTE — PRE-PROCEDURE
GENERAL PRE-PROCEDURE:   Procedure:  Coronary angiogram with possible PCI  Date/Time:  10/13/2021 2:46 PM    Verbal consent obtained?: Yes    Written consent obtained?: Yes    Risks and benefits: Risks, benefits and alternatives were discussed    DC Plan: Appropriate discharge home plan in place for patients who are going home after procedure   Consent given by:  Patient  Patient states understanding of procedure being performed: Yes    Patient's understanding of procedure matches consent: Yes    Procedure consent matches procedure scheduled: Yes    Expected level of sedation:  Moderate  Appropriately NPO:  Yes  Mallampati  :  Grade 3- soft palate visible, posterior pharyngeal wall not visible  Lungs:  Lungs clear with good breath sounds bilaterally  Heart:  Normal heart sounds and rate  History & Physical reviewed:  History and physical reviewed and no updates needed  Statement of review:  I have reviewed the lab findings, diagnostic data, medications, and the plan for sedation    VIANCA Shields, CNP  [facility]

## 2021-10-13 NOTE — Clinical Note
The first balloon was inserted into the left anterior descending and proximal left anterior descending.Max pressure = 12 serena. Total duration = 10 seconds.

## 2021-10-13 NOTE — PLAN OF CARE
D/admit from [facility] and may or may not go home tonight per report. TR band on right hand with 12 ml with hand has good cms.  He reportedly had chest pain for a month, CT angio showed multi-vessel disease. History of [place], anxiety CCL PTCA to LAD and Circ x2, and he needs more repair in the future. He had 1600 Heparin and 10 mg Hydralazine for 's He was on 2 L or RA. He ate and drank and voided. Denies chest pain or acid reflux at this time. Wants to go home later tonight-reviewed form TR band off and site is flat and soft.He says BP always >140, but BP level ok before discharge Walked Has 2 bags belongings, walked to car so family member can drive home He picked up his meds He has stent card.

## 2021-10-13 NOTE — H&P
[facility]   Cardiology Service  Same Day Admission and Discharge  H&P and Discharge Note      Harjit Medina MRN# [id number]   YOB: 1965 Age: 56 year old       Admission Date: 10/13/2021  Discharge Date: 10/13/2021    HPI:   Harjit Medina is a 56 year old male with a history of BARRINGTON with CPAP use, NAYELY, obesity, and ongoing tobacco use. He was initially seen in the Cardiology Clinic by Dr. Costa on 9/1/21 for evaluation of coronary artery calcification that was incidentally discovered on lung cancer screening CT. At appointment, he endorsed chest brief periods of chest pressure that occurred at rest but did not endorse any history of exertional chest pain or dyspnea. Due to the presence of extensive coronary artery calcification, he was recommended to undergo coronary CTA.     He presented for coronary CTA today. Prior to the onset of the test, he endorsed 5/10 left sided chest pain. CTA was performed and revealed severe, 3 vessel CAD not involving the left main. Patient continued to have intermittent chest pain so it was recommended that he be transferred to the ED for further evaluation. Chest pain was mildly improved with nitroglycerin but not completely relieved. In the ED, EKG was unremarkable and troponin negative. Patient was admitted to [facility] for further evaluation and management of chest pain with concern for unstable angina given his severe CAD.     Over the last week couple weeks, he has experienced intermittent, resting chest pain that occurs only at rest. He describes it as a muscle cramp in his left chest. He primarily notices it while sitting or lying down. Last night, he experienced a more severe episode of chest pain that occurred while lying in bed. No associated dyspnea, dizziness/lightheadedness, nausea, diaphoresis, or palpitations. The chest pain he experienced this morning was similar but less severe. No radiation of the pain.  He denies a  history of exertional chest pain or dyspnea and reports that he walks his dogs at least 2 miles daily without difficulty.  His wife has noticed that he has become more short of breath walking up stairs in the last few months. No personal history of HTN, HLD, DM2 or excessive alcohol use. He is a current smoker with 30 pack year history. No family history of CAD.     Past Medical History:   Diagnosis Date     NAYELY (generalized anxiety disorder)      Sleep apnea     cpap       Past Surgical History:   Procedure Laterality Date     COLONOSCOPY N/A 1/4/2018    Procedure: COMBINED COLONOSCOPY, SINGLE OR MULTIPLE BIOPSY/POLYPECTOMY BY BIOPSY;;  Surgeon: Leeroy Jacobsen MD;  Location: [facility] OR     COLONOSCOPY WITH CO2 INSUFFLATION N/A 1/4/2018    Procedure: COLONOSCOPY WITH CO2 INSUFFLATION;  COLON-SCREENING / LIMON ;  Surgeon: Leeroy Jacobsen MD;  Location: [facility] OR       0.9% sodium chloride BOLUS  0.9% sodium chloride BOLUS  diphenhydrAMINE (BENADRYL) capsule 25 mg  diphenhydrAMINE (BENADRYL) injection 25-50 mg  [COMPLETED] iopamidol (ISOVUE-370) solution 120 mL  ivabradine (CORLANOR) tablet 5-15 mg  lidocaine (cardiac) (XYLOCAINE) injection 100 mg  methylPREDNISolone sodium succinate (solu-MEDROL) injection 125 mg  metoprolol (LOPRESSOR) injection 5-15 mg  [COMPLETED] metoprolol tartrate (LOPRESSOR) tablet  mg  nitroGLYcerin (NITROSTAT) sublingual tablet 0.4-0.8 mg  ondansetron (ZOFRAN) injection 4 mg  sodium chloride (PF) 0.9% PF flush 5-10 mL  sodium chloride bacteriostatic 0.9 % flush 0-1 mL    aspirin (ASA) 81 MG EC tablet, Take 1 tablet (81 mg) by mouth daily  atorvastatin (LIPITOR) 40 MG tablet, Take 1 tablet (40 mg) by mouth daily  escitalopram (LEXAPRO) 10 MG tablet, Take 1/2 tab daily for 1 week, then increase to 1 full tab daily thereafter. (Patient taking differently: Take 10 mg by mouth daily Take 1/2 tab daily for 1 week, then increase to 1 full tab daily thereafter.)  order for DME,  Equipment being ordered: CPAP and all necessary supplies  sildenafil (REVATIO) 20 MG tablet, 2-5 tabs 1/2 to 4 hours prior to relations (Patient not taking: Reported on 9/1/2021)  trimethoprim-polymyxin b (POLYTRIM) 35175-1.1 UNIT/ML-% ophthalmic solution, Place 1-2 drops into both eyes every 4 hours (Patient not taking: Reported on 7/26/2021)  varenicline (CHANTIX AL) 0.5 MG X 11 & 1 MG X 42 tablet, Take 0.5 mg tab daily for 3 days, THEN 0.5 mg tab twice daily for 4 days, THEN 1 mg twice daily. (Patient not taking: Reported on 8/25/2021)  varenicline (CHANTIX) 1 MG tablet, Take 1 tablet (1 mg) by mouth 2 times daily (Patient not taking: Reported on 8/25/2021)        Family History   Problem Relation Age of Onset     Dementia Mother      Other Cancer Father         brain      Diabetes Brother        Social History     Tobacco Use     Smoking status: Current Every Day Smoker     Packs/day: 1.00     Types: Cigarettes     Smokeless tobacco: Current User     Last attempt to quit: 9/1/2018   Substance Use Topics     Alcohol use: Yes     Comment: occ -        No Known Allergies      ROS:   CONSTITUTIONAL:No report of fevers or chills  RESPIRATORY: No cough, wheezing, SOB, or hemoptysis  CARDIOVASCULAR: see HPI  MUSCULO-SKELETAL: No joint pain/swelling, no muscle pain  NEURO: No paresthesias, syncope, pre-syncope, lightheadness, dizziness or vertigo  ENDOCRINE: No temperature intolerance, no skin/hair changes  PSYCHIATRIC: No change in mood, feeling down/anxious, no change in sleep or appetite  GI: no melena or hematochezia, no change in bowel habits  : no hematuria or dysuria, no hesitancy, dribbling or incontinence  HEME: no easy bruising or bleeding, no history of anemia, no history of blood clots  SKIN: no rashes or sores, no unusual itching      Physical Examination:  Vitals: BP (!) 147/91   Pulse 58   Temp 98.4  F (36.9  C) (Oral)   Resp 16   SpO2 94%   BMI= There is no height or weight on file to calculate  BMI.    GENERAL APPEARANCE: Alert, interactive. Healthy and comfortable appearing. Accompanied by his wife.   HEENT: Normocephalic, atraumatic. Sclera clear. No xanthelasmas.   NECK: increased neck girth  CHEST: Normal work of breathing. Lungs clear to auscultation without rales, rhonchi or wheezes, no use of accessory muscles, no retractions  CARDIOVASCULAR: mild bradycardia, regular rhythm, normal S1 and S2, no S3 or S4 and no murmur, click or rub. Radial and pedal pulses 2+ bilaterally.   ABDOMEN: Round but not distended.   EXTREMITIES: warm and well perfused, no lower extremity edema  NEURO: alert and oriented to person/place/time, normal speech, moves all extremities  PSYCH: Mood and affect are appropriate  SKIN: no ecchymoses, no rashes      Laboratory:  CMP  Recent Labs   Lab 10/13/21  1055 10/13/21  0828     --    POTASSIUM 4.6  --    CHLORIDE 101  --    CO2 31  --    ANIONGAP 4  --    GLC 94  --    BUN 8  --    CR 0.87 1.1   GFRESTIMATED >90 >60   NIYA 8.7  --    PROTTOTAL 7.6  --    ALBUMIN 3.9  --    BILITOTAL 1.7*  --    ALKPHOS 92  --    AST 40  --    ALT 37  --      CBC  Recent Labs   Lab 10/13/21  1055   WBC 9.6   RBC 5.19   HGB 16.7   HCT 48.7   MCV 94   MCH 32.2   MCHC 34.3   RDW 13.1          Lab Results   Component Value Date    TROPONIN <0.015 10/13/2021         EKG 10/13/21      TTE 10/13/21:  Interpretation Summary  Global and regional left ventricular function is normal with an EF of 60-65%.  No regional wall motion abnormalities are seen.  Right ventricular function, chamber size, wall motion, and thickness are  normal.  No significant valvular abnormalities were noted.  Previous study not available for comparison.      Assessment and plan:   Harjit Medina is a 56 year old male with a history of BARRINGTON with CPAP use, NAYELY, obesity, and ongoing tobacco use with severe, multivessel CAD on coronary CTA who was admitted for chest pain concerning for unstable angina.     # Chest pain  concerning for unstable angina   # Severe, multivessel CAD on CTA   # Ongoing tobacco use  # Obesity  # Pre-diabetes, Hgb A1c 5.9%  Patient presents with several week history of left sided chest pain occurring at rest and no prior history of exertional dyspnea or angina. The chest pain is rather atypical but given his coronary CTA with severe, 3 vessel CAD, there is increased concern for unstable angina. Fortunately, EKG and troponin exclude STEMI. Echocardiogram performed today revealed normal LVEF of 60-65% with no regional wall motion abnormalities. With the extensive nature of his CAD as well as ongoing chest pain, it was recommended that he proceed with coronary angiography. Received aspirin 324 mg in the ED. No heparin since not ACS. Coronary angiogram confirmed severe, multivessel CAD involving the LAD, LCx, and RCA. PCI was performed with VIOLETTE to the LAD and LCx with plan for patient to return in 1 month for staged intervention to the RCA. Given that this was not an ACS event and procedure was uncomplicated, patient was able to discharge home after meeting post-procedure discharge criteria.   - DAPT: aspirin 81 mg (indefinitely) + Brilinta 90 mg BID (at least 12 months)  - statin: continue atorvastatin 40 mg   - smoking cessation encouraged. Patient has a prescription for Chantix but has not started.   - weight loss and heart healthy diety encouraged  - Cardiac rehab  - follow up with Interventional Cardiology [facility] or Dr. Zaragoza in 1-2 weeks  - plan to return for PCI to RCA in 1 month     # NAYELY  Continue PTA escitalopram    # BARRINGTON with CPAP use    Pertinent Procedures:  Coronary angiogram    Consults:  Cardiac rehab    Medication Changes:  START Brilinta     Discharge medications:     Current Discharge Medication List      START taking these medications    Details   nitroGLYcerin (NITROSTAT) 0.4 MG sublingual tablet For chest pain place 1 tablet under the tongue every 5 minutes for 3 doses. If symptoms  persist 5 minutes after 1st dose call 911.  Qty: 25 tablet, Refills: 11    Associated Diagnoses: Coronary artery disease of native artery of native heart with stable angina pectoris (H)      ticagrelor (BRILINTA) 90 MG tablet Take 1 tablet (90 mg) by mouth 2 times daily Dose to start tomorrow morning.  Qty: 180 tablet, Refills: 3    Associated Diagnoses: Unstable angina (H)         CONTINUE these medications which have NOT CHANGED    Details   aspirin (ASA) 81 MG EC tablet Take 1 tablet (81 mg) by mouth daily  Qty: 90 tablet, Refills: 3    Associated Diagnoses: Coronary artery calcification seen on CAT scan      atorvastatin (LIPITOR) 40 MG tablet Take 1 tablet (40 mg) by mouth daily  Qty: 90 tablet, Refills: 3    Associated Diagnoses: Coronary artery calcification seen on CAT scan      escitalopram (LEXAPRO) 10 MG tablet Take 1/2 tab daily for 1 week, then increase to 1 full tab daily thereafter.  Qty: 30 tablet, Refills: 1    Associated Diagnoses: NAYELY (generalized anxiety disorder)      order for DME Equipment being ordered: CPAP and all necessary supplies  Qty: 1 Device, Refills: 0    Associated Diagnoses: BARRINGTON (obstructive sleep apnea)      sildenafil (REVATIO) 20 MG tablet 2-5 tabs 1/2 to 4 hours prior to relations  Qty: 30 tablet, Refills: 11    Associated Diagnoses: Other male erectile dysfunction      trimethoprim-polymyxin b (POLYTRIM) 80684-2.1 UNIT/ML-% ophthalmic solution Place 1-2 drops into both eyes every 4 hours  Qty: 10 mL, Refills: 0    Associated Diagnoses: Bacterial conjunctivitis      varenicline (CHANTIX AL) 0.5 MG X 11 & 1 MG X 42 tablet Take 0.5 mg tab daily for 3 days, THEN 0.5 mg tab twice daily for 4 days, THEN 1 mg twice daily.  Qty: 53 tablet, Refills: 0    Associated Diagnoses: Nicotine abuse      varenicline (CHANTIX) 1 MG tablet Take 1 tablet (1 mg) by mouth 2 times daily  Qty: 60 tablet, Refills: 2    Associated Diagnoses: Nicotine abuse             Follow-up:  Interventional  Cardiology [facility] or Dr. Zaragoza in 1-2 weeks  Staged PCI to RCA in 1 month      Patient seen and discussed with Dr. Miki Zaragoza. Documentation above represents joint decision making.     Gladys Garcia PA-C  [facility] Cardiology  [phone]

## 2021-10-13 NOTE — Clinical Note
The first balloon was inserted into the circumflex and proximal circumflex.Max pressure = 16 serena. Total duration = 10 seconds.

## 2021-10-13 NOTE — Clinical Note
The first balloon was inserted into the circumflex and middle circumflex.Max pressure = 12 serena. Total duration = 10 seconds.

## 2021-10-13 NOTE — Clinical Note
The first balloon was inserted into the left anterior descending and middle left anterior descending.Max pressure = 12 serena. Total duration = 10 seconds.

## 2021-10-13 NOTE — Clinical Note
The first balloon was inserted into the left anterior descending and proximal left anterior descending.Max pressure = 16 serena. Total duration = 10 seconds.     Max pressure = 20 serena. Total duration = 10 seconds.  Balloon reinflated a fourth time: Max pressure = 20 serena. Total duration = 10 seconds.

## 2021-10-13 NOTE — Clinical Note
dry, intact, no bleeding and no hematoma. 6Fr sheath removed from R radial artery. TR band applied w/ 12cc air. See Epic flowsheet for details.

## 2021-10-13 NOTE — Clinical Note
The first balloon was inserted into the left anterior descending and middle left anterior descending.Max pressure = 12 serena. Total duration = 10 seconds.     Max pressure = 12 serena. Total duration = 10 seconds.    Balloon reinflated a second time: Max pressure = 12 serena. Total duration = 10 seconds.

## 2021-10-13 NOTE — Clinical Note
The first balloon was inserted into the circumflex and middle circumflex.Max pressure = 16 serena. Total duration = 10 seconds.     Max pressure = 14 serena. Total duration = 10 seconds.    Balloon reinflated a second time: Max pressure = 14 serena. Total duration = 10 seconds.

## 2021-10-13 NOTE — Clinical Note
Stent deployed in the proximal left anterior descending. Max pressure = 14 serena. Total duration = 10 seconds.

## 2021-10-13 NOTE — Clinical Note
Max pressure = 4 serena. Total duration = 10 seconds.     Max pressure = 4 serena. Total duration = 10 seconds.   3.0 x 12 ShockWave.  Balloon reinflated a second time: Max pressure = 4 serena. Total duration = 10 seconds.  Balloon reinflated a third time: Max pressure = 4 serena. Total duration = 10 seconds.

## 2021-10-14 LAB
ATRIAL RATE - MUSE: 57 BPM
ATRIAL RATE - MUSE: 69 BPM
DIASTOLIC BLOOD PRESSURE - MUSE: NORMAL MMHG
DIASTOLIC BLOOD PRESSURE - MUSE: NORMAL MMHG
INTERPRETATION ECG - MUSE: NORMAL
INTERPRETATION ECG - MUSE: NORMAL
P AXIS - MUSE: 38 DEGREES
P AXIS - MUSE: 47 DEGREES
PR INTERVAL - MUSE: 208 MS
PR INTERVAL - MUSE: 210 MS
QRS DURATION - MUSE: 100 MS
QRS DURATION - MUSE: 98 MS
QT - MUSE: 392 MS
QT - MUSE: 414 MS
QTC - MUSE: 402 MS
QTC - MUSE: 420 MS
R AXIS - MUSE: 65 DEGREES
R AXIS - MUSE: 88 DEGREES
SYSTOLIC BLOOD PRESSURE - MUSE: NORMAL MMHG
SYSTOLIC BLOOD PRESSURE - MUSE: NORMAL MMHG
T AXIS - MUSE: 38 DEGREES
T AXIS - MUSE: 49 DEGREES
VENTRICULAR RATE- MUSE: 57 BPM
VENTRICULAR RATE- MUSE: 69 BPM

## 2021-10-18 ENCOUNTER — TELEPHONE (OUTPATIENT)
Dept: FAMILY MEDICINE | Facility: CLINIC | Age: 56
End: 2021-10-18

## 2021-10-18 NOTE — TELEPHONE ENCOUNTER
Reason for Call:  Other     Detailed comments: Patient wanted to thank provider for the healthcare he provided him.     Phone Number Patient can be reached at: Home number on file [phone] (home)    Best Time:     Can we leave a detailed message on this number? YES    Call taken on 10/18/2021 at 8:39 AM by Camille Barahona

## 2021-10-19 NOTE — DISCHARGE SUMMARY
[address] [address]  p: [phone]    Discharge Summary: Cardiology Service    Harjit Medina MRN# [id number]   YOB: 1965 Age: 56 year old       Admission Date: 10/13/2021  Discharge Date: 10/13/21    Discharge Diagnoses:  # Chest pain concerning for unstable angina   # Severe, multivessel CAD on CTA   # Ongoing tobacco use  # Obesity  # Pre-diabetes, Hgb A1c 5.9%  # NAYELY  # BARRINGTON with CPAP use    Brief HPI:  Harjit Medina is a 56 year old male with a history of BARRINGTON with CPAP use, NAYELY, obesity, and ongoing tobacco use. He was initially seen in the Cardiology Clinic by Dr. Costa on 9/1/21 for evaluation of coronary artery calcification that was incidentally discovered on lung cancer screening CT. At appointment, he endorsed chest brief periods of chest pressure that occurred at rest but did not endorse any history of exertional chest pain or dyspnea. Due to the presence of extensive coronary artery calcification, he was recommended to undergo coronary CTA.      He presented for coronary CTA today. Prior to the onset of the test, he endorsed 5/10 left sided chest pain. CTA was performed and revealed severe, 3 vessel CAD not involving the left main. Patient continued to have intermittent chest pain so it was recommended that he be transferred to the ED for further evaluation. Chest pain was mildly improved with nitroglycerin but not completely relieved. In the ED, EKG was unremarkable and troponin negative. Patient was admitted to [facility] for further evaluation and management of chest pain with concern for unstable angina given his severe CAD.      Over the last week couple weeks, he has experienced intermittent, resting chest pain that occurs only at rest. He describes it as a muscle cramp in his left chest. He primarily notices it while sitting or lying down. Last night, he experienced a more severe episode of chest pain that  occurred while lying in bed. No associated dyspnea, dizziness/lightheadedness, nausea, diaphoresis, or palpitations. The chest pain he experienced this morning was similar but less severe. No radiation of the pain.  He denies a history of exertional chest pain or dyspnea and reports that he walks his dogs at least 2 miles daily without difficulty.  His wife has noticed that he has become more short of breath walking up stairs in the last few months. No personal history of HTN, HLD, DM2 or excessive alcohol use. He is a current smoker with 30 pack year history. No family history of CAD.     Hospital Course by Diagnosis:  # Chest pain concerning for unstable angina   # Severe, multivessel CAD on CTA   # Ongoing tobacco use  # Obesity  # Pre-diabetes, Hgb A1c 5.9%  Patient presents with several week history of left sided chest pain occurring at rest and no prior history of exertional dyspnea or angina. The chest pain is rather atypical but given his coronary CTA with severe, 3 vessel CAD, there is increased concern for unstable angina. Fortunately, EKG and troponin exclude STEMI. Echocardiogram performed today revealed normal LVEF of 60-65% with no regional wall motion abnormalities. With the extensive nature of his CAD as well as ongoing chest pain, it was recommended that he proceed with coronary angiography. Received aspirin 324 mg in the ED. No heparin since not ACS. Coronary angiogram confirmed severe, multivessel CAD involving the LAD, LCx, and RCA. PCI was performed with VIOLETTE to the LAD and LCx with plan for patient to return in 1 month for staged intervention to the RCA. Given that this was not an ACS event and procedure was uncomplicated, patient was able to discharge home after meeting post-procedure discharge criteria.   - DAPT: aspirin 81 mg (indefinitely) + Brilinta 90 mg BID (at least 12 months)  - statin: continue atorvastatin 40 mg   - smoking cessation encouraged. Patient has a prescription for Chantix  but has not started.   - weight loss and heart healthy diety encouraged  - Cardiac rehab  - follow up with Interventional Cardiology KEISHA or Dr. Zaragoza in 1-2 weeks  - plan to return for PCI to RCA in 1 month      # NAYELY  Continue PTA escitalopram     # BARRINGTON with CPAP use      Pertinent Procedures:  Coronary angiogram     Consults:  Cardiac rehab    Discharge medications:   Discharge Medication List as of 10/13/2021  6:26 PM      START taking these medications    Details   nitroGLYcerin (NITROSTAT) 0.4 MG sublingual tablet For chest pain place 1 tablet under the tongue every 5 minutes for 3 doses. If symptoms persist 5 minutes after 1st dose call 911., Disp-25 tablet, R-11, E-Prescribe      ticagrelor (BRILINTA) 90 MG tablet Take 1 tablet (90 mg) by mouth 2 times daily Dose to start tomorrow morning., Disp-180 tablet, R-3, E-Prescribe         CONTINUE these medications which have NOT CHANGED    Details   aspirin (ASA) 81 MG EC tablet Take 1 tablet (81 mg) by mouth daily, Disp-90 tablet, R-3, E-Prescribe      atorvastatin (LIPITOR) 40 MG tablet Take 1 tablet (40 mg) by mouth daily, Disp-90 tablet, R-3, E-Prescribe      escitalopram (LEXAPRO) 10 MG tablet Take 1/2 tab daily for 1 week, then increase to 1 full tab daily thereafter., Disp-30 tablet, R-1, E-Prescribe      order for DME Equipment being ordered: CPAP and all necessary suppliesDisp-1 Device, R-0, Local Print      sildenafil (REVATIO) 20 MG tablet 2-5 tabs 1/2 to 4 hours prior to relations, Disp-30 tablet, R-11, Local Print      trimethoprim-polymyxin b (POLYTRIM) 86078-5.1 UNIT/ML-% ophthalmic solution Place 1-2 drops into both eyes every 4 hours, Disp-10 mL, R-0, E-Prescribe      varenicline (CHANTIX AL) 0.5 MG X 11 & 1 MG X 42 tablet Take 0.5 mg tab daily for 3 days, THEN 0.5 mg tab twice daily for 4 days, THEN 1 mg twice daily., Disp-53 tablet, R-0, E-Prescribe      varenicline (CHANTIX) 1 MG tablet Take 1 tablet (1 mg) by mouth 2 times daily, Disp-60  tablet, R-2, E-Prescribe             Follow-up:  Interventional Cardiology KEISHA or Dr. Zaragoza in 1-2 weeks  Staged PCI to RCA in 1 month    Labs or imaging requiring follow-up after discharge:  None    Code status:  Full    Condition on discharge  Vitals: BP (!) 147/91   Pulse 58   Temp 98.4  F (36.9  C) (Oral)   Resp 16   SpO2 94%   BMI= There is no height or weight on file to calculate BMI.     GENERAL APPEARANCE: Alert, interactive. Healthy and comfortable appearing. Accompanied by his wife.   HEENT: Normocephalic, atraumatic. Sclera clear. No xanthelasmas.   NECK: increased neck girth  CHEST: Normal work of breathing. Lungs clear to auscultation without rales, rhonchi or wheezes, no use of accessory muscles, no retractions  CARDIOVASCULAR: mild bradycardia, regular rhythm, normal S1 and S2, no S3 or S4 and no murmur, click or rub. Radial and pedal pulses 2+ bilaterally.   ABDOMEN: Round but not distended.   EXTREMITIES: warm and well perfused, no lower extremity edema  NEURO: alert and oriented to person/place/time, normal speech, moves all extremities  PSYCH: Mood and affect are appropriate  SKIN: no ecchymoses, no rashes    Imaging with results:  TTE 10/13/21:  Interpretation Summary  Global and regional left ventricular function is normal with an EF of 60-65%.  No regional wall motion abnormalities are seen.  Right ventricular function, chamber size, wall motion, and thickness are  normal.  No significant valvular abnormalities were noted.  Previous study not available for comparison.    CTA coronary 10/13/21  IMPRESSION:  1.  Severe obstructive three-vessel CAD, not including left main.  Patient sent to the ED due to unstable angina.  2.  Total Agatston score 1382 placing the patient in the 99 percentile  when compared to age and gender matched control group.  3.  Please review Radiology report for incidental noncardiac findings  that will follow separately.     FINDINGS:  CORONARY CALCIUM SCORE  Total  Agatston calcium score: 1382   Left main: 23  Left anterior descendin  Left circumflex: 155  Right coronary artery: 542   This places the patient in the 99 percentile when compared to age and  gender matched control group.     CORONARY ANGIOGRAPHY  DOMINANCE: Right dominant system.   Normal coronary origins and course.     LEFT MAIN:   The left main arises normally from the left coronary cusp and is  patent with minimal calcific plaque.     LEFT ANTERIOR DESCENDING:   Proximal LAD: Calcified plaque, likely severe stenosis.  Mid LAD: Severe (>70%) stenosis composed of mixed plaque.  The remainder of the left anterior descending and its major diagonal  branches are patent with minimal luminal irregularities.     LEFT CIRCUMFLEX:   Proximal LCX:Severe (>70%) stenosis composed of mixed plaque, distal  to origin of high OM1.  The remainder of the left circumflex and its major marginal branches  are patent with minimal luminal irregularities.     RIGHT CORONARY ARTERY:   Proximal RCA: Severe (>70%) stenosis stenosis composed of mixed  plaque.  The remainder of the right coronary and the posterior descending  artery have diffuse disease with likely additional severe stenosis.    Coronary angiogram 10/13/21  Conclusion  1.  Severe three vessel CAD.  2. Lithotripsy of the mid LAD lesion.  3. PCI with four drug eluting stents (two to the LAD and two to the LCx and OM2).       Recent Labs   Lab 10/13/21  1055 10/13/21  0828     --    POTASSIUM 4.6  --    CHLORIDE 101  --    CO2 31  --    ANIONGAP 4  --    GLC 94  --    BUN 8  --    CR 0.87 1.1   GFRESTIMATED >90 >60   NIYA 8.7  --      Patient Care Team:  Piter Maynard PA-C as PCP - General (Physician Assistant)  Piter Maynard PA-C as Assigned PCP  Ramiro Costa MD as Assigned Heart and Vascular Provider    35 minutes spent in discharge, including >50% in counseling and coordination of care, medication review and plan of care recommended on follow up.  Questions were answered.   It was our pleasure to care for Harjit Medina during this hospitalization. Please do not hesitate to contact me should there be questions regarding the hospital course or discharge plan.    Patient discussed with staff cardiologist, Dr. Zaragoza, who agrees with the above documentation and plan. Documentation represents joint decision making.     Gladys Garcia PA-C  [facility] Card[facility]

## 2021-10-20 ENCOUNTER — HOSPITAL ENCOUNTER (OUTPATIENT)
Dept: CARDIAC REHAB | Facility: CLINIC | Age: 56
End: 2021-10-20
Attending: NURSE PRACTITIONER
Payer: COMMERCIAL

## 2021-10-20 DIAGNOSIS — I20.0 UNSTABLE ANGINA (H): ICD-10-CM

## 2021-10-20 PROCEDURE — 93798 PHYS/QHP OP CAR RHAB W/ECG: CPT

## 2021-10-21 ENCOUNTER — HOSPITAL ENCOUNTER (OUTPATIENT)
Facility: CLINIC | Age: 56
End: 2021-10-21
Attending: INTERNAL MEDICINE | Admitting: INTERNAL MEDICINE
Payer: COMMERCIAL

## 2021-10-21 DIAGNOSIS — Z11.59 ENCOUNTER FOR SCREENING FOR OTHER VIRAL DISEASES: ICD-10-CM

## 2021-10-21 DIAGNOSIS — I25.10 CORONARY ARTERY DISEASE INVOLVING NATIVE CORONARY ARTERY OF NATIVE HEART WITHOUT ANGINA PECTORIS: Primary | ICD-10-CM

## 2021-10-21 DIAGNOSIS — I25.10 CORONARY ARTERY DISEASE INVOLVING NATIVE CORONARY ARTERY OF NATIVE HEART WITHOUT ANGINA PECTORIS: ICD-10-CM

## 2021-11-01 ENCOUNTER — HOSPITAL ENCOUNTER (OUTPATIENT)
Dept: CARDIAC REHAB | Facility: CLINIC | Age: 56
End: 2021-11-01
Attending: NURSE PRACTITIONER
Payer: COMMERCIAL

## 2021-11-01 PROCEDURE — 93798 PHYS/QHP OP CAR RHAB W/ECG: CPT

## 2021-11-03 ENCOUNTER — HOSPITAL ENCOUNTER (OUTPATIENT)
Dept: CARDIAC REHAB | Facility: CLINIC | Age: 56
End: 2021-11-03
Attending: NURSE PRACTITIONER
Payer: COMMERCIAL

## 2021-11-03 PROCEDURE — 93798 PHYS/QHP OP CAR RHAB W/ECG: CPT

## 2021-11-05 ENCOUNTER — NURSE TRIAGE (OUTPATIENT)
Dept: FAMILY MEDICINE | Facility: CLINIC | Age: 56
End: 2021-11-05

## 2021-11-05 ENCOUNTER — APPOINTMENT (OUTPATIENT)
Dept: GENERAL RADIOLOGY | Facility: CLINIC | Age: 56
End: 2021-11-05
Attending: EMERGENCY MEDICINE
Payer: COMMERCIAL

## 2021-11-05 ENCOUNTER — HOSPITAL ENCOUNTER (OUTPATIENT)
Facility: CLINIC | Age: 56
Setting detail: OBSERVATION
Discharge: HOME | End: 2021-11-06
Attending: EMERGENCY MEDICINE | Admitting: INTERNAL MEDICINE
Payer: COMMERCIAL

## 2021-11-05 DIAGNOSIS — F17.210 CIGARETTE SMOKER: ICD-10-CM

## 2021-11-05 DIAGNOSIS — I20.0 UNSTABLE ANGINA (H): ICD-10-CM

## 2021-11-05 DIAGNOSIS — Z98.61 PERCUTANEOUS TRANSLUMINAL CORONARY ANGIOPLASTY STATUS: ICD-10-CM

## 2021-11-05 DIAGNOSIS — Z11.52 ENCOUNTER FOR SCREENING LABORATORY TESTING FOR SEVERE ACUTE RESPIRATORY SYNDROME CORONAVIRUS 2 (SARS-COV-2): ICD-10-CM

## 2021-11-05 DIAGNOSIS — I25.10 CORONARY ARTERY CALCIFICATION SEEN ON CAT SCAN: Primary | ICD-10-CM

## 2021-11-05 LAB
ACT BLD: 251 SECONDS (ref 74–150)
ACT BLD: 255 SECONDS (ref 74–150)
ACT BLD: 380 SECONDS (ref 74–150)
ALBUMIN SERPL-MCNC: 4.1 G/DL (ref 3.4–5)
ALP SERPL-CCNC: 124 U/L (ref 40–150)
ALT SERPL W P-5'-P-CCNC: 62 U/L (ref 0–70)
ANION GAP SERPL CALCULATED.3IONS-SCNC: 4 MMOL/L (ref 3–14)
AST SERPL W P-5'-P-CCNC: 24 U/L (ref 0–45)
ATRIAL RATE - MUSE: 77 BPM
BASOPHILS # BLD AUTO: 0 10E3/UL (ref 0–0.2)
BASOPHILS NFR BLD AUTO: 0 %
BILIRUB SERPL-MCNC: 1.3 MG/DL (ref 0.2–1.3)
BUN SERPL-MCNC: 14 MG/DL (ref 7–30)
CALCIUM SERPL-MCNC: 9.3 MG/DL (ref 8.5–10.1)
CHLORIDE BLD-SCNC: 102 MMOL/L (ref 94–109)
CHOLEST SERPL-MCNC: 157 MG/DL
CO2 SERPL-SCNC: 29 MMOL/L (ref 20–32)
CREAT SERPL-MCNC: 0.82 MG/DL (ref 0.66–1.25)
DIASTOLIC BLOOD PRESSURE - MUSE: NORMAL MMHG
EOSINOPHIL # BLD AUTO: 0.1 10E3/UL (ref 0–0.7)
EOSINOPHIL NFR BLD AUTO: 1 %
ERYTHROCYTE [DISTWIDTH] IN BLOOD BY AUTOMATED COUNT: 12.6 % (ref 10–15)
FASTING STATUS PATIENT QL REPORTED: NORMAL
GFR SERPL CREATININE-BSD FRML MDRD: >90 ML/MIN/1.73M2
GLUCOSE BLD-MCNC: 110 MG/DL (ref 70–99)
HCT VFR BLD AUTO: 45.8 % (ref 40–53)
HDLC SERPL-MCNC: 53 MG/DL
HGB BLD-MCNC: 15.9 G/DL (ref 13.3–17.7)
HOLD SPECIMEN: NORMAL
IMM GRANULOCYTES # BLD: 0.1 10E3/UL
IMM GRANULOCYTES NFR BLD: 1 %
INTERPRETATION ECG - MUSE: NORMAL
LDLC SERPL CALC-MCNC: 90 MG/DL
LYMPHOCYTES # BLD AUTO: 1.3 10E3/UL (ref 0.8–5.3)
LYMPHOCYTES NFR BLD AUTO: 13 %
MCH RBC QN AUTO: 32.2 PG (ref 26.5–33)
MCHC RBC AUTO-ENTMCNC: 34.7 G/DL (ref 31.5–36.5)
MCV RBC AUTO: 93 FL (ref 78–100)
MONOCYTES # BLD AUTO: 0.6 10E3/UL (ref 0–1.3)
MONOCYTES NFR BLD AUTO: 6 %
NEUTROPHILS # BLD AUTO: 8.4 10E3/UL (ref 1.6–8.3)
NEUTROPHILS NFR BLD AUTO: 79 %
NONHDLC SERPL-MCNC: 104 MG/DL
NRBC # BLD AUTO: 0 10E3/UL
NRBC BLD AUTO-RTO: 0 /100
P AXIS - MUSE: 34 DEGREES
PLATELET # BLD AUTO: 281 10E3/UL (ref 150–450)
POTASSIUM BLD-SCNC: 4.5 MMOL/L (ref 3.4–5.3)
PR INTERVAL - MUSE: 190 MS
PROT SERPL-MCNC: 8 G/DL (ref 6.8–8.8)
QRS DURATION - MUSE: 92 MS
QT - MUSE: 370 MS
QTC - MUSE: 418 MS
R AXIS - MUSE: 49 DEGREES
RBC # BLD AUTO: 4.94 10E6/UL (ref 4.4–5.9)
SARS-COV-2 RNA RESP QL NAA+PROBE: NEGATIVE
SODIUM SERPL-SCNC: 135 MMOL/L (ref 133–144)
SYSTOLIC BLOOD PRESSURE - MUSE: NORMAL MMHG
T AXIS - MUSE: 40 DEGREES
TRIGL SERPL-MCNC: 69 MG/DL
TROPONIN I SERPL-MCNC: <0.015 UG/L (ref 0–0.04)
VENTRICULAR RATE- MUSE: 77 BPM
WBC # BLD AUTO: 10.6 10E3/UL (ref 4–11)

## 2021-11-05 PROCEDURE — 999N000054 HC STATISTIC EKG NON-CHARGEABLE

## 2021-11-05 PROCEDURE — 93010 ELECTROCARDIOGRAM REPORT: CPT | Performed by: EMERGENCY MEDICINE

## 2021-11-05 PROCEDURE — C9803 HOPD COVID-19 SPEC COLLECT: HCPCS | Performed by: EMERGENCY MEDICINE

## 2021-11-05 PROCEDURE — 71046 X-RAY EXAM CHEST 2 VIEWS: CPT

## 2021-11-05 PROCEDURE — 250N000013 HC RX MED GY IP 250 OP 250 PS 637: Performed by: PHYSICIAN ASSISTANT

## 2021-11-05 PROCEDURE — C9600 PERC DRUG-EL COR STENT SING: HCPCS | Performed by: INTERNAL MEDICINE

## 2021-11-05 PROCEDURE — C1874 STENT, COATED/COV W/DEL SYS: HCPCS | Performed by: INTERNAL MEDICINE

## 2021-11-05 PROCEDURE — 99152 MOD SED SAME PHYS/QHP 5/>YRS: CPT | Performed by: INTERNAL MEDICINE

## 2021-11-05 PROCEDURE — 82040 ASSAY OF SERUM ALBUMIN: CPT | Performed by: EMERGENCY MEDICINE

## 2021-11-05 PROCEDURE — 85347 COAGULATION TIME ACTIVATED: CPT

## 2021-11-05 PROCEDURE — 250N000009 HC RX 250: Performed by: INTERNAL MEDICINE

## 2021-11-05 PROCEDURE — 250N000011 HC RX IP 250 OP 636: Performed by: INTERNAL MEDICINE

## 2021-11-05 PROCEDURE — G0378 HOSPITAL OBSERVATION PER HR: HCPCS

## 2021-11-05 PROCEDURE — C1753 CATH, INTRAVAS ULTRASOUND: HCPCS | Performed by: INTERNAL MEDICINE

## 2021-11-05 PROCEDURE — 93010 ELECTROCARDIOGRAM REPORT: CPT | Mod: 76 | Performed by: INTERNAL MEDICINE

## 2021-11-05 PROCEDURE — 84484 ASSAY OF TROPONIN QUANT: CPT | Performed by: EMERGENCY MEDICINE

## 2021-11-05 PROCEDURE — 93005 ELECTROCARDIOGRAM TRACING: CPT | Performed by: EMERGENCY MEDICINE

## 2021-11-05 PROCEDURE — 99153 MOD SED SAME PHYS/QHP EA: CPT | Performed by: INTERNAL MEDICINE

## 2021-11-05 PROCEDURE — 93005 ELECTROCARDIOGRAM TRACING: CPT | Mod: XU

## 2021-11-05 PROCEDURE — 36415 COLL VENOUS BLD VENIPUNCTURE: CPT | Performed by: STUDENT IN AN ORGANIZED HEALTH CARE EDUCATION/TRAINING PROGRAM

## 2021-11-05 PROCEDURE — C1725 CATH, TRANSLUMIN NON-LASER: HCPCS | Performed by: INTERNAL MEDICINE

## 2021-11-05 PROCEDURE — 80053 COMPREHEN METABOLIC PANEL: CPT | Performed by: EMERGENCY MEDICINE

## 2021-11-05 PROCEDURE — C1769 GUIDE WIRE: HCPCS | Performed by: INTERNAL MEDICINE

## 2021-11-05 PROCEDURE — 99285 EMERGENCY DEPT VISIT HI MDM: CPT | Mod: 25 | Performed by: EMERGENCY MEDICINE

## 2021-11-05 PROCEDURE — 80061 LIPID PANEL: CPT | Performed by: STUDENT IN AN ORGANIZED HEALTH CARE EDUCATION/TRAINING PROGRAM

## 2021-11-05 PROCEDURE — 71046 X-RAY EXAM CHEST 2 VIEWS: CPT | Mod: 26 | Performed by: STUDENT IN AN ORGANIZED HEALTH CARE EDUCATION/TRAINING PROGRAM

## 2021-11-05 PROCEDURE — 36415 COLL VENOUS BLD VENIPUNCTURE: CPT | Performed by: EMERGENCY MEDICINE

## 2021-11-05 PROCEDURE — U0005 INFEC AGEN DETEC AMPLI PROBE: HCPCS | Performed by: PHYSICIAN ASSISTANT

## 2021-11-05 PROCEDURE — 272N000001 HC OR GENERAL SUPPLY STERILE: Performed by: INTERNAL MEDICINE

## 2021-11-05 PROCEDURE — 92978 ENDOLUMINL IVUS OCT C 1ST: CPT | Performed by: INTERNAL MEDICINE

## 2021-11-05 PROCEDURE — C1894 INTRO/SHEATH, NON-LASER: HCPCS | Performed by: INTERNAL MEDICINE

## 2021-11-05 PROCEDURE — 250N000013 HC RX MED GY IP 250 OP 250 PS 637: Performed by: STUDENT IN AN ORGANIZED HEALTH CARE EDUCATION/TRAINING PROGRAM

## 2021-11-05 PROCEDURE — 92920 PRQ TRLUML C ANGIOP 1ART&/BR: CPT | Performed by: INTERNAL MEDICINE

## 2021-11-05 PROCEDURE — 85025 COMPLETE CBC W/AUTO DIFF WBC: CPT | Performed by: EMERGENCY MEDICINE

## 2021-11-05 PROCEDURE — 93454 CORONARY ARTERY ANGIO S&I: CPT | Performed by: INTERNAL MEDICINE

## 2021-11-05 PROCEDURE — 250N000013 HC RX MED GY IP 250 OP 250 PS 637: Performed by: INTERNAL MEDICINE

## 2021-11-05 PROCEDURE — C1887 CATHETER, GUIDING: HCPCS | Performed by: INTERNAL MEDICINE

## 2021-11-05 DEVICE — STENT CORONARY DES SYNERGY XD MR US 2.50X24MM H7493941824250: Type: IMPLANTABLE DEVICE | Status: FUNCTIONAL

## 2021-11-05 DEVICE — STENT CORONARY DES SYNERGY XD MR US 3.00X32MM H7493941832300: Type: IMPLANTABLE DEVICE | Status: FUNCTIONAL

## 2021-11-05 DEVICE — STENT CORONARY DES SYNERGY XD MR US 2.50X16MM H7493941816250: Type: IMPLANTABLE DEVICE | Status: FUNCTIONAL

## 2021-11-05 RX ORDER — ACETAMINOPHEN 325 MG/1
650 TABLET ORAL EVERY 6 HOURS PRN
Status: DISCONTINUED | OUTPATIENT
Start: 2021-11-05 | End: 2021-11-06 | Stop reason: HOSPADM

## 2021-11-05 RX ORDER — ASPIRIN 81 MG/1
81 TABLET, CHEWABLE ORAL ONCE
Status: DISCONTINUED | OUTPATIENT
Start: 2021-11-05 | End: 2021-11-05

## 2021-11-05 RX ORDER — MAGNESIUM HYDROXIDE/ALUMINUM HYDROXICE/SIMETHICONE 120; 1200; 1200 MG/30ML; MG/30ML; MG/30ML
30 SUSPENSION ORAL EVERY 4 HOURS PRN
Status: DISCONTINUED | OUTPATIENT
Start: 2021-11-05 | End: 2021-11-06 | Stop reason: HOSPADM

## 2021-11-05 RX ORDER — ASPIRIN 81 MG/1
243 TABLET, CHEWABLE ORAL ONCE
Status: COMPLETED | OUTPATIENT
Start: 2021-11-05 | End: 2021-11-05

## 2021-11-05 RX ORDER — ATROPINE SULFATE 0.1 MG/ML
0.5 INJECTION INTRAVENOUS
Status: ACTIVE | OUTPATIENT
Start: 2021-11-05 | End: 2021-11-06

## 2021-11-05 RX ORDER — NITROGLYCERIN 5 MG/ML
VIAL (ML) INTRAVENOUS
Status: DISCONTINUED | OUTPATIENT
Start: 2021-11-05 | End: 2021-11-05 | Stop reason: HOSPADM

## 2021-11-05 RX ORDER — ASPIRIN 81 MG/1
81 TABLET ORAL DAILY
Status: DISCONTINUED | OUTPATIENT
Start: 2021-11-06 | End: 2021-11-05

## 2021-11-05 RX ORDER — NALOXONE HYDROCHLORIDE 0.4 MG/ML
0.4 INJECTION, SOLUTION INTRAMUSCULAR; INTRAVENOUS; SUBCUTANEOUS
Status: ACTIVE | OUTPATIENT
Start: 2021-11-05 | End: 2021-11-06

## 2021-11-05 RX ORDER — IOPAMIDOL 755 MG/ML
INJECTION, SOLUTION INTRAVASCULAR
Status: DISCONTINUED | OUTPATIENT
Start: 2021-11-05 | End: 2021-11-05 | Stop reason: HOSPADM

## 2021-11-05 RX ORDER — ONDANSETRON 4 MG/1
4 TABLET, ORALLY DISINTEGRATING ORAL EVERY 6 HOURS PRN
Status: DISCONTINUED | OUTPATIENT
Start: 2021-11-05 | End: 2021-11-06 | Stop reason: HOSPADM

## 2021-11-05 RX ORDER — NICARDIPINE HYDROCHLORIDE 2.5 MG/ML
INJECTION INTRAVENOUS
Status: DISCONTINUED | OUTPATIENT
Start: 2021-11-05 | End: 2021-11-05 | Stop reason: HOSPADM

## 2021-11-05 RX ORDER — NITROGLYCERIN 0.4 MG/1
0.4 TABLET SUBLINGUAL EVERY 5 MIN PRN
Status: DISCONTINUED | OUTPATIENT
Start: 2021-11-05 | End: 2021-11-06 | Stop reason: HOSPADM

## 2021-11-05 RX ORDER — NALOXONE HYDROCHLORIDE 0.4 MG/ML
0.2 INJECTION, SOLUTION INTRAMUSCULAR; INTRAVENOUS; SUBCUTANEOUS
Status: ACTIVE | OUTPATIENT
Start: 2021-11-05 | End: 2021-11-06

## 2021-11-05 RX ORDER — OXYCODONE HYDROCHLORIDE 10 MG/1
10 TABLET ORAL EVERY 4 HOURS PRN
Status: DISCONTINUED | OUTPATIENT
Start: 2021-11-05 | End: 2021-11-06 | Stop reason: HOSPADM

## 2021-11-05 RX ORDER — ASPIRIN 81 MG/1
81 TABLET, CHEWABLE ORAL DAILY
Qty: 30 TABLET | Refills: 3 | Status: SHIPPED | OUTPATIENT
Start: 2021-11-05 | End: 2021-11-06

## 2021-11-05 RX ORDER — METOPROLOL TARTRATE 1 MG/ML
5 INJECTION, SOLUTION INTRAVENOUS
Status: DISCONTINUED | OUTPATIENT
Start: 2021-11-05 | End: 2021-11-06 | Stop reason: HOSPADM

## 2021-11-05 RX ORDER — ASPIRIN 81 MG/1
81 TABLET ORAL DAILY
Status: DISCONTINUED | OUTPATIENT
Start: 2021-11-06 | End: 2021-11-06 | Stop reason: HOSPADM

## 2021-11-05 RX ORDER — FENTANYL CITRATE 50 UG/ML
INJECTION, SOLUTION INTRAMUSCULAR; INTRAVENOUS
Status: DISCONTINUED | OUTPATIENT
Start: 2021-11-05 | End: 2021-11-05 | Stop reason: HOSPADM

## 2021-11-05 RX ORDER — OXYCODONE HYDROCHLORIDE 5 MG/1
5 TABLET ORAL EVERY 4 HOURS PRN
Status: DISCONTINUED | OUTPATIENT
Start: 2021-11-05 | End: 2021-11-06 | Stop reason: HOSPADM

## 2021-11-05 RX ORDER — FENTANYL CITRATE 50 UG/ML
25 INJECTION, SOLUTION INTRAMUSCULAR; INTRAVENOUS
Status: DISCONTINUED | OUTPATIENT
Start: 2021-11-05 | End: 2021-11-06 | Stop reason: HOSPADM

## 2021-11-05 RX ORDER — HEPARIN SODIUM 1000 [USP'U]/ML
INJECTION, SOLUTION INTRAVENOUS; SUBCUTANEOUS
Status: DISCONTINUED | OUTPATIENT
Start: 2021-11-05 | End: 2021-11-05 | Stop reason: HOSPADM

## 2021-11-05 RX ORDER — ONDANSETRON 2 MG/ML
4 INJECTION INTRAMUSCULAR; INTRAVENOUS EVERY 6 HOURS PRN
Status: DISCONTINUED | OUTPATIENT
Start: 2021-11-05 | End: 2021-11-06 | Stop reason: HOSPADM

## 2021-11-05 RX ORDER — HYDRALAZINE HYDROCHLORIDE 20 MG/ML
INJECTION INTRAMUSCULAR; INTRAVENOUS
Status: DISCONTINUED | OUTPATIENT
Start: 2021-11-05 | End: 2021-11-05 | Stop reason: HOSPADM

## 2021-11-05 RX ORDER — HYDRALAZINE HYDROCHLORIDE 20 MG/ML
10 INJECTION INTRAMUSCULAR; INTRAVENOUS EVERY 4 HOURS PRN
Status: DISCONTINUED | OUTPATIENT
Start: 2021-11-05 | End: 2021-11-06 | Stop reason: HOSPADM

## 2021-11-05 RX ORDER — NITROGLYCERIN 0.4 MG/1
0.4 TABLET SUBLINGUAL EVERY 5 MIN PRN
Status: DISCONTINUED | OUTPATIENT
Start: 2021-11-05 | End: 2021-11-05

## 2021-11-05 RX ORDER — PRASUGREL 10 MG/1
10 TABLET, FILM COATED ORAL DAILY
Status: DISCONTINUED | OUTPATIENT
Start: 2021-11-06 | End: 2021-11-06 | Stop reason: HOSPADM

## 2021-11-05 RX ORDER — SODIUM CHLORIDE 9 MG/ML
INJECTION, SOLUTION INTRAVENOUS CONTINUOUS
Status: ACTIVE | OUTPATIENT
Start: 2021-11-05 | End: 2021-11-06

## 2021-11-05 RX ORDER — ROSUVASTATIN CALCIUM 20 MG/1
40 TABLET, COATED ORAL DAILY
Status: DISCONTINUED | OUTPATIENT
Start: 2021-11-06 | End: 2021-11-06 | Stop reason: HOSPADM

## 2021-11-05 RX ORDER — PRASUGREL 5 MG/1
TABLET, FILM COATED ORAL
Status: DISCONTINUED | OUTPATIENT
Start: 2021-11-05 | End: 2021-11-05 | Stop reason: HOSPADM

## 2021-11-05 RX ORDER — FLUMAZENIL 0.1 MG/ML
0.2 INJECTION, SOLUTION INTRAVENOUS
Status: ACTIVE | OUTPATIENT
Start: 2021-11-05 | End: 2021-11-06

## 2021-11-05 RX ADMIN — ASPIRIN 81 MG CHEWABLE TABLET 243 MG: 81 TABLET CHEWABLE at 15:26

## 2021-11-05 RX ADMIN — ACETAMINOPHEN 650 MG: 325 TABLET, FILM COATED ORAL at 20:27

## 2021-11-05 ASSESSMENT — ENCOUNTER SYMPTOMS
ABDOMINAL PAIN: 0
NAUSEA: 0
DIFFICULTY URINATING: 0
VOMITING: 0
CHEST TIGHTNESS: 1
CHILLS: 0
FEVER: 0
COUGH: 0
BRUISES/BLEEDS EASILY: 0
ADENOPATHY: 0
HEADACHES: 0
NECK STIFFNESS: 0
POLYDIPSIA: 0
EYE REDNESS: 0
SHORTNESS OF BREATH: 0
CONFUSION: 0
COLOR CHANGE: 0
ARTHRALGIAS: 0

## 2021-11-05 ASSESSMENT — MIFFLIN-ST. JEOR: SCORE: 1881.8

## 2021-11-05 NOTE — Clinical Note
The first balloon was inserted into the right coronary artery.Max pressure = 12 serena. Total duration = 10 seconds.     Max pressure = 14 serena. Total duration = 10 seconds.    Balloon reinflated a second time: Max pressure = 14 serena. Total duration = 10 seconds.

## 2021-11-05 NOTE — H&P
Brief cardiology progress note    Patient presented to ED with intermittent, central, no exertional chest pressure that occurred at work. Had one episode yesterday that lasted for unclear amount of time, and one episode today. Today's episode lasted 40 min, and resolved spontaneously on drive to ED. Similar in nature to prior anginal symptoms. Of note, he was here ~3 weeks ago where he was found to have severe multivessel disease. Received lithotripsy of LAD and PCI x2, with further stenting of LCx and OM. Was set to have staged procedure of the RCA next week.   Troponin undetectable, EKG without acute findings per ED. Will plan to proceed with outpatient angiogram today to address RCA lesions, and discharge to home after.     Patient discussed with staff cardiologist, Dr. Merlos, who agrees with the above plan.        Giovanny Bellamy PA-C  [facility] Cardiology Team

## 2021-11-05 NOTE — Clinical Note
The first balloon was inserted into the left anterior descending.Max pressure = 14 serena. Total duration = 10 seconds.     Max pressure = 16 serena. Total duration = 10 seconds.    Balloon reinflated a second time: Max pressure = 16 serena. Total duration = 10 seconds.  Balloon reinflated a third time: Max pressure = 12 serena. Total duration = 10 seconds.

## 2021-11-05 NOTE — Clinical Note
The first balloon was inserted into the left anterior descending.Max pressure = 16 serena. Total duration = 10 seconds.

## 2021-11-05 NOTE — TELEPHONE ENCOUNTER
Patient stated he had 3 stents placed within the past month and is scheduled for the 4th stent in a week or two.  Patient stated he was uncertain if the chest tightening and sweating intermittently was due to anxiety regarding the upcoming scheduled surgery.  Patient denied any anxiety prior to this past week, even when informed in the beginning he may have open heart surgery.  Per protocol and symptoms, advised patient to seek emergency care at this time.  Advised patient to have someone else drive.  Patient stated understanding.    Reason for Disposition    Chest pain or 'angina' comes and goes and is happening more often (increasing in frequency) or getting worse (increasing in severity) (Exception: chest pains that last only a few seconds)    Chest pain lasting longer than 5 minutes and occurred in last 3 days (72 hours) (Exception: feels exactly the same as previously diagnosed heartburn and has accompanying sour taste in mouth)    Major surgery in the past month    Additional Information    Negative: Severe difficulty breathing (e.g., struggling for each breath, speaks in single words)    Negative: Passed out (i.e., fainted, collapsed and was not responding)    Negative: Difficult to awaken or acting confused (e.g., disoriented, slurred speech)    Negative: Shock suspected (e.g., cold/pale/clammy skin, too weak to stand, low BP, rapid pulse)    Negative: Chest pain lasting longer than 5 minutes and ANY of the following:* Over 45 years old* Over 30 years old and at least one cardiac risk factor (e.g., diabetes, high blood pressure, high cholesterol, smoker, or strong family history of heart disease)* History of heart disease (i.e., angina, heart attack, heart failure, bypass surgery, takes nitroglycerin)* Pain is crushing, pressure-like, or heavy    Negative: Heart beating < 50 beats per minute OR > 140 beats per minute    Negative: Visible sweat on face or sweat dripping down face    Negative: Sounds like a  "life-threatening emergency to the triager    Negative: Followed an injury to chest    Negative: SEVERE chest pain    Negative: Pain also in shoulder(s) or arm(s) or jaw    Negative: Difficulty breathing    Negative: Cocaine use within last 3 days    Negative: Hip or leg fracture (broken bone) in past month (or had cast on leg or ankle in past month)    Negative: Illness requiring prolonged bedrest in past month (e.g., immobilization, long hospital stay)    Negative: Long-distance travel in past month (e.g., car, bus, train, plane; with trip lasting 6 or more hours)    Negative: History of prior 'blood clot' in leg or lungs (i.e., deep vein thrombosis, pulmonary embolism)    Negative: History of inherited increased risk of blood clots (e.g., Factor 5 Leiden, Anti-thrombin 3, Protein C or Protein S deficiency, Prothrombin mutation)    Negative: Heart beating irregularly or very rapidly    Answer Assessment - Initial Assessment Questions  1. LOCATION: \"Where does it hurt?\"        Chest tightness and sweating    2. RADIATION: \"Does the pain go anywhere else?\" (e.g., into neck, jaw, arms, back)      No    3. ONSET: \"When did the chest pain begin?\" (Minutes, hours or days)       Intermittently 1-2 weeks    4. PATTERN \"Does the pain come and go, or has it been constant since it started?\"  \"Does it get worse with exertion?\"       Comes and goes, does not get worse with exertion    5. DURATION: \"How long does it last\" (e.g., seconds, minutes, hours)      Minutes    6. SEVERITY: \"How bad is the pain?\"  (e.g., Scale 1-10; mild, moderate, or severe)     - MILD (1-3): doesn't interfere with normal activities      - MODERATE (4-7): interferes with normal activities or awakens from sleep     - SEVERE (8-10): excruciating pain, unable to do any normal activities        Mild    7. CARDIAC RISK FACTORS: \"Do you have any history of heart problems or risk factors for heart disease?\" (e.g., angina, prior heart attack; diabetes, high " "blood pressure, high cholesterol, smoker, or strong family history of heart disease)      Yes    8. PULMONARY RISK FACTORS: \"Do you have any history of lung disease?\"  (e.g., blood clots in lung, asthma, emphysema, birth control pills)      No    9. CAUSE: \"What do you think is causing the chest pain?\"      Anxiety? Heart    10. OTHER SYMPTOMS: \"Do you have any other symptoms?\" (e.g., dizziness, nausea, vomiting, sweating, fever, difficulty breathing, cough)        Sweating    11. PREGNANCY: \"Is there any chance you are pregnant?\" \"When was your last menstrual period?\"        NA    Protocols used: CHEST PAIN-A-[place]  Joanna Flaherty RN      "

## 2021-11-05 NOTE — Clinical Note
Stent deployed in the right coronary artery. Max pressure = 16 serena. Total duration = 10 seconds.

## 2021-11-05 NOTE — Clinical Note
Stent deployed in the right coronary artery. Max pressure = 12 serena. Total duration = 10 seconds. Balloon reinflated a second time: Max pressure = 16 serena. Total duration = 10 seconds. Balloon reinflated a third time: Max pressure = 16 serena. Total duration = 10 seconds.

## 2021-11-05 NOTE — Clinical Note
The first balloon was inserted into the right coronary artery.Max pressure = 16 serena. Total duration = 10 seconds.     Max pressure = 16 serena. Total duration = 10 seconds.    Balloon reinflated a second time: Max pressure = 16 serena. Total duration = 10 seconds.

## 2021-11-05 NOTE — PROGRESS NOTES
"NUTRITION ASSESSMENT AND EDUCATION    Reason For Assessment  Harjit Medina is a 56 year old male seen by Registered Dietitian for 1:1 Cardiac Rehab consult    Diet Recall  Breakfast WW toast w/peanutbutter, eggs or beaters, turkey sausage,    Lunch Turkey sandwich, tuna sandwich, fruit    Dinner [place] filet, stuffed mushrooms, chicken breast, vegetables    Snacks Waffers    Beverages Juice, water, electrolyte drinks      Per pt visit: Pt and wife present during visit, have already started making dietary changes which include no/less soda, less frequent eating out/fast foods. Reviewed food recall as above and weight trends, pt notes varying weights at times unsure if related to fluid status changes vs other, no noted lower extremity edema, pt does admit 1 ring on hand was taken off due to concern not fitting well. Reviewed heart healthy diet recommendations and watching salt in diet, noted electrolyte drinks contain sodium so suggested using flavored/calorie free water instead. Reviewed last noted lipid panel and A1c. Encouraged weight loss and discussed goal of 1800 calorie meal plan for weight loss, reviewed recommendation of using calorie tracker jessica to obtain goals, reviewed tips for cutting calories. Reviewed options for home delivered meal services and noted many services allow to select for heart healthy/low sodium options.     ANTHROPOMETRICS  Ht Readings from Last 1 Encounters:   05/07/21 1.753 m (5' 9\")     Wt Readings from Last 20 Encounters:   09/01/21 106.7 kg (235 lb 4.8 oz)   07/26/21 107 kg (235 lb 12.8 oz)   05/12/21 109.2 kg (240 lb 12.8 oz)   05/07/21 111.1 kg (245 lb)   03/04/20 106.1 kg (234 lb)     BMI Readings from Last 1 Encounters:   09/01/21 34.75 kg/m    Weight Status:  Obesity Grade I BMI 30-34.9  IBW: 72.7 kg   % IBW: 147    Dosing weight: 81.3 kg     ASSESSED NUTRITION NEEDS   Estimated Energy Needs: 0955-8639 kcals (20-25 Kcal/Kg)  Justification: Obese  Estimated Protein Needs: " 65-80 grams protein (0.8-1 g pro/Kg)  Justification: Maintenance  Estimated Fluid Needs: 2030  mL (25 mL/kg)  Justification: Maintenance    NUTRITION DIAGNOSIS  Food- and nutrition- related knowledge deficit related to therapeutic recommendations as evidenced by pt report of no/limited formal nutrition education.    INTERVENTIONS  Nutrition Prescription:  Heart healthy, 1800 calorie meal plan     Implementation:  1. Assessed learning needs and learning preference.  2. Nutrition Education (Content):  a) Provided handouts: Seasoning without salt, Heart Healthy Nutrition Therapy, 1800 calorie meal plan   b) Discussed heart healthy diet recommendations including minimizing added salts/saturated fats/sugars, balanced meals TID, heart healthy substitutes, and calorie and sugar free beverages    Nutrition Education (Application):  a) Discussed eating habits and recommended alternative food choices:  a. Emphasized fruits, vegetables, low sodium foods, and heart healthy fats  b. Reviewed recipe changes and new food ideas  c. Encouraged intake of non-caloric beverages  d. Discussed monitor meal ingredients, portions, choices     Goals  1. Follow a heart healthy, 1800 calorie meal plan and other nutrition related recommendations    Follow up/Monitoring For Cardiac Rehab Staff  Additional questions/concerns related to heart healthy guidelines.     Shiloh Kovacs, RD, CNSC, LD

## 2021-11-05 NOTE — ED TRIAGE NOTES
Triage Assessment & Note:    Patient presents with: PT reports on and off CP that started this AM. PT indicates SOB but denies diaphoresis. PT reports CP is less than it was when it started and CP does not vary with activity.     Home Treatments/Remedies: None    Febrile / Afebrile? Afebrile     Duration of C/o: 0800 this am    Kendall Sharp RN  November 5, 2021

## 2021-11-05 NOTE — Clinical Note
The first balloon was inserted into the right coronary artery.Max pressure = 12 serena. Total duration = 10 seconds.     Max pressure = 12 serena. Total duration = 10 seconds.    Balloon reinflated a second time: Max pressure = 12 serena. Total duration = 10 seconds.  Balloon reinflated a third time: Max pressure = 16 serena. Total duration = 10 seconds. Max pressure = 16 serena. Total duration = 10 seconds.

## 2021-11-05 NOTE — ED PROVIDER NOTES
[facility] ([facility])  11/05/21    History     Chief Complaint   Patient presents with     Chest Pain     The history is provided by the patient and medical records.     Harjit Medina is a 56 year old male with a past medical history significant for placement of 3 stents approximately 3 weeks ago who presents to the emergency department after sudden onset of chest pain while sitting in a meeting at work.  Pain is diffuse, mild, pressure-like, nonradiating, nothing made it better or worse.  It resolved within an hour.  He had similar occurrence yesterday at home while at rest.  No shortness of breath or diaphoresis.  No nausea/vomiting/abdominal pain or fevers.  No other concerns or complaints.      This part of the medical record was transcribed by Kristin Wiggins, Medical Scribe, from a dictation done by Parish Robert MD.     Past Medical History  Past Medical History:   Diagnosis Date     NAYELY (generalized anxiety disorder)      Sleep apnea     cpap     Past Surgical History:   Procedure Laterality Date     COLONOSCOPY N/A 1/4/2018    Procedure: COMBINED COLONOSCOPY, SINGLE OR MULTIPLE BIOPSY/POLYPECTOMY BY BIOPSY;;  Surgeon: Leeroy Jacobsen MD;  Location: [facility] OR     COLONOSCOPY WITH CO2 INSUFFLATION N/A 1/4/2018    Procedure: COLONOSCOPY WITH CO2 INSUFFLATION;  COLON-SCREENING / LIMON ;  Surgeon: Leeroy Jacobsen MD;  Location: [facility] OR     CV CORONARY LITHOTRIPSY PCI N/A 10/13/2021    Procedure: CV Coronary Lithotripsy PCI;  Surgeon: Miki Zaragoza MD;  Location:  HEART CARDIAC CATH LAB     CV HEART CATHETERIZATION WITH POSSIBLE INTERVENTION N/A 10/13/2021    Procedure: Heart Catheterization with Possible Intervention;  Surgeon: Miki Zaragoza MD;  Location:  HEART CARDIAC CATH LAB     CV PCI STENT DRUG ELUTING N/A 10/13/2021    Procedure: Percutaneous Coronary Intervention Stent Drug Eluting;  Surgeon: Miki Zaragoza  MD Dolores;  Location:  HEART CARDIAC CATH LAB     aspirin (ASA) 81 MG EC tablet  atorvastatin (LIPITOR) 40 MG tablet  escitalopram (LEXAPRO) 10 MG tablet  nitroGLYcerin (NITROSTAT) 0.4 MG sublingual tablet  order for DME  sildenafil (REVATIO) 20 MG tablet  ticagrelor (BRILINTA) 90 MG tablet  trimethoprim-polymyxin b (POLYTRIM) 65477-0.1 UNIT/ML-% ophthalmic solution  varenicline (CHANTIX AL) 0.5 MG X 11 & 1 MG X 42 tablet  varenicline (CHANTIX) 1 MG tablet      No Known Allergies  Family History  Family History   Problem Relation Age of Onset     Dementia Mother      Other Cancer Father         brain      Diabetes Brother      Social History   Social History     Tobacco Use     Smoking status: Current Every Day Smoker     Packs/day: 1.00     Types: Cigarettes     Smokeless tobacco: Current User     Last attempt to quit: 9/1/2018   Vaping Use     Vaping Use: Never used   Substance Use Topics     Alcohol use: Yes     Comment: occ -      Drug use: No      Past medical history, past surgical history, medications, allergies, family history, and social history were reviewed with the patient. No additional pertinent items.       Review of Systems   Constitutional: Negative for chills and fever.   HENT: Negative for congestion.    Eyes: Negative for redness.   Respiratory: Positive for chest tightness. Negative for cough and shortness of breath.    Cardiovascular: Positive for chest pain.   Gastrointestinal: Negative for abdominal pain, nausea and vomiting.   Endocrine: Negative for polydipsia and polyuria.   Genitourinary: Negative for difficulty urinating.   Musculoskeletal: Negative for arthralgias and neck stiffness.   Skin: Negative for color change.   Neurological: Negative for headaches.   Hematological: Negative for adenopathy. Does not bruise/bleed easily.   Psychiatric/Behavioral: Negative for confusion.   All other systems reviewed and are negative.      Physical Exam   BP: (!) 185/83  Pulse: 75  Temp:  "97.7  F (36.5  C)  Resp: 16  Height: 175.3 cm (5' 9\")  Weight: 106.1 kg (234 lb)  SpO2: 99 %  Physical Exam  Vitals and nursing note reviewed.   Constitutional:       General: He is not in acute distress.     Appearance: Normal appearance. He is not ill-appearing, toxic-appearing or diaphoretic.   HENT:      Head: Normocephalic and atraumatic.   Eyes:      General: No scleral icterus.     Extraocular Movements: Extraocular movements intact.      Conjunctiva/sclera: Conjunctivae normal.   Cardiovascular:      Rate and Rhythm: Normal rate.      Pulses: Normal pulses.      Heart sounds: Normal heart sounds.   Pulmonary:      Effort: Pulmonary effort is normal. No respiratory distress.      Breath sounds: Normal breath sounds.   Abdominal:      General: Bowel sounds are normal.      Palpations: Abdomen is soft.      Tenderness: There is no abdominal tenderness.   Musculoskeletal:         General: No tenderness. Normal range of motion.      Cervical back: Normal range of motion and neck supple.      Right lower leg: No edema.      Left lower leg: No edema.   Skin:     General: Skin is warm.      Findings: No rash.   Neurological:      General: No focal deficit present.      Mental Status: He is alert and oriented to person, place, and time.      Cranial Nerves: No cranial nerve deficit.      Coordination: Coordination normal.   Psychiatric:         Mood and Affect: Mood normal.         Behavior: Behavior normal.         Thought Content: Thought content normal.         Judgment: Judgment normal.         ED Course      Procedures            EKG Interpretation:      Interpreted by Parish Robert MD  Time reviewed: 12:11 PM  Symptoms at time of EKG: Chest pain  Rhythm: normal sinus   Rate: normal  Axis: normal  Ectopy: none  Conduction: normal  ST Segments/ T Waves: No ST-T wave changes  Q Waves: none  Comparison to prior: Unchanged from old EKG done on October 13, 2021    Clinical Impression: NSR with no acute ischemic " changes             Results for orders placed or performed during the hospital encounter of 11/05/21   XR Chest 2 Views     Status: None    Narrative    EXAM: XR CHEST 2 VW  11/5/2021 2:19 PM     HISTORY:  chest pain       COMPARISON:  CT chest 7/28/2021    FINDINGS:   PA and lateral radiographs of the chest. Trachea is midline. Cardiac  silhouette is within normal limits. No airspace opacities. No pleural  effusion or pneumothorax. Osseous structures are within normal limits.  Left glenoid instrumentation.      Impression    IMPRESSION:  Mild  bibasilar opacities, may represent atelectasis  versus infection versus pulmonary edema.    I have personally reviewed the examination and initial interpretation  and I agree with the findings.    LORI GOMEZ MD         SYSTEM ID:  [id number]   Comprehensive metabolic panel     Status: Abnormal   Result Value Ref Range    Sodium 135 133 - 144 mmol/L    Potassium 4.5 3.4 - 5.3 mmol/L    Chloride 102 94 - 109 mmol/L    Carbon Dioxide (CO2) 29 20 - 32 mmol/L    Anion Gap 4 3 - 14 mmol/L    Urea Nitrogen 14 7 - 30 mg/dL    Creatinine 0.82 0.66 - 1.25 mg/dL    Calcium 9.3 8.5 - 10.1 mg/dL    Glucose 110 (H) 70 - 99 mg/dL    Alkaline Phosphatase 124 40 - 150 U/L    AST 24 0 - 45 U/L    ALT 62 0 - 70 U/L    Protein Total 8.0 6.8 - 8.8 g/dL    Albumin 4.1 3.4 - 5.0 g/dL    Bilirubin Total 1.3 0.2 - 1.3 mg/dL    GFR Estimate >90 >60 mL/min/1.73m2   Troponin I     Status: Normal   Result Value Ref Range    Troponin I <0.015 0.000 - 0.045 ug/L   CBC with platelets and differential     Status: Abnormal   Result Value Ref Range    WBC Count 10.6 4.0 - 11.0 10e3/uL    RBC Count 4.94 4.40 - 5.90 10e6/uL    Hemoglobin 15.9 13.3 - 17.7 g/dL    Hematocrit 45.8 40.0 - 53.0 %    MCV 93 78 - 100 fL    MCH 32.2 26.5 - 33.0 pg    MCHC 34.7 31.5 - 36.5 g/dL    RDW 12.6 10.0 - 15.0 %    Platelet Count 281 150 - 450 10e3/uL    % Neutrophils 79 %    % Lymphocytes 13 %    % Monocytes 6 %    %  Eosinophils 1 %    % Basophils 0 %    % Immature Granulocytes 1 %    NRBCs per 100 WBC 0 <1 /100    Absolute Neutrophils 8.4 (H) 1.6 - 8.3 10e3/uL    Absolute Lymphocytes 1.3 0.8 - 5.3 10e3/uL    Absolute Monocytes 0.6 0.0 - 1.3 10e3/uL    Absolute Eosinophils 0.1 0.0 - 0.7 10e3/uL    Absolute Basophils 0.0 0.0 - 0.2 10e3/uL    Absolute Immature Granulocytes 0.1 (H) <=0.0 10e3/uL    Absolute NRBCs 0.0 10e3/uL   Extra Blue Top Tube     Status: None   Result Value Ref Range    Hold Specimen JIC    Extra Red Top Tube     Status: None   Result Value Ref Range    Hold Specimen JIC    Extra Purple Top Tube     Status: None   Result Value Ref Range    Hold Specimen JIC    EKG 12 lead     Status: None   Result Value Ref Range    Systolic Blood Pressure  mmHg    Diastolic Blood Pressure  mmHg    Ventricular Rate 77 BPM    Atrial Rate 77 BPM    NC Interval 190 ms    QRS Duration 92 ms     ms    QTc 418 ms    P Axis 34 degrees    R AXIS 49 degrees    T Axis 40 degrees    Interpretation ECG       Sinus rhythm  Cannot rule out Anterior infarct , age undetermined  Abnormal ECG  Unconfirmed report - interpretation of this ECG is computer generated - see medical record for final interpretation  Confirmed by - EMERGENCY ROOM, PHYSICIAN ([id number]),  NILA CAIN ([id number]) on 11/5/2021 2:59:54 PM     CBC with platelets differential     Status: Abnormal    Narrative    The following orders were created for panel order CBC with platelets differential.  Procedure                               Abnormality         Status                     ---------                               -----------         ------                     CBC with platelets and d...[[id number]]  Abnormal            Final result                 Please view results for these tests on the individual orders.   [place] Draw     Status: None    Narrative    The following orders were created for panel order [place] Draw.  Procedure                                Abnormality         Status                     ---------                               -----------         ------                     Extra Blue Top Tube[[id number]]                              Final result               Extra Red Top Tube[[id number]]                               Final result               Extra Purple Top Tube[[id number]]                            Final result                 Please view results for these tests on the individual orders.     Medications   aspirin (ASA) chewable tablet 243 mg (243 mg Oral Given 11/5/21 1526)        Assessments & Plan (with Medical Decision Making)   56-year-old man with multivessel cardiac disease and recent stenting presents with chest pain.  Differential diagnosis: ACS, stent occlusion, esophageal spasm, GERD, pneumothorax.    After thorough history physical exam patient appears to be in no acute distress.  I will obtain laboratory studies EKG chest x-ray for further diagnostic evaluation.  Patient agrees with the plan.    Laboratory studies returned with no leukocytosis, WBC is normal at 10,600.  There is no anemia, hemoglobin is normal at 15.9.  Electrolytes show no evidence of dehydration, creatinine is normal at 0.82.  Troponin is undetectable.  EKG showed no acute ischemic changes.  I reviewed his chest x-ray and read the radiology report; no evidence of pneumonia, pneumothorax, or widened mediastinum.  I consulted [facility] cardiology service given the patient had recent stenting and scheduled additional stent placement in several days and the decision was made after cardiology evaluation for him to undergo PCI today for further evaluation and intervention.  He will be admitted to [facility] cardiology service.     I have reviewed the nursing notes. I have reviewed the findings, diagnosis, plan and need for follow up with the patient.    This part of the medical record was transcribed by Kristin Wiggins Medical Scribe, from a dictation done by Parish Robert MD.     [place]i  Prescriptions    No medications on file       Final diagnoses:   Unstable angina (H)     Kristin WEBSTER, am serving as a trained medical scribe to document services personally performed by Parish Robert MD, MD, based on the provider's statements to me.     Yesica WEBSTER Nikola, MD, MD, was physically present and have reviewed and verified the accuracy of this note documented by Kristin Wiggins.  --  Parish Robert MD  [facility] EMERGENCY DEPARTMENT  11/5/2021     Parish Robert MD  11/05/21 1199

## 2021-11-05 NOTE — Clinical Note
Stent deployed in the right coronary artery. Max pressure = 15 serena. Total duration = 10 seconds.

## 2021-11-05 NOTE — Clinical Note
The first balloon was inserted into the right coronary artery.Max pressure = 12 serena. Total duration = 10 seconds.     Max pressure = 14 serena. Total duration = 10 seconds.    Balloon reinflated a second time: Max pressure = 14 serena. Total duration = 10 seconds.  Balloon reinflated a third time: Max pressure = 14 serena. Total duration = 10 seconds.

## 2021-11-05 NOTE — Clinical Note
The first balloon was inserted into the right coronary artery.Max pressure = 12 serena. Total duration = 10 seconds.     Max pressure = 12 serena. Total duration = 10 seconds.   1.5 x 20 emerge.  Balloon reinflated a second time: Max pressure = 12 serena. Total duration = 10 seconds.  Balloon reinflated a third time: Max pressure = 12 serena. Total duration = 10 seconds.

## 2021-11-05 NOTE — PROGRESS NOTES
Brief cardiology progress note    Patient presented to ED with intermittent, central, no exertional chest pressure that occurred at work. Had one episode yesterday that lasted for unclear amount of time, and one episode today. Today's episode lasted 40 min, and resolved spontaneously on drive to ED. Similar in nature to prior anginal symptoms. Of note, he was here ~3 weeks ago where he was found to have severe multivessel disease. Received lithotripsy of LAD and PCI x2, with further stenting of LCx and OM. Was set to have staged procedure of the RCA next week.   Troponin undetectable, EKG without acute findings per ED. Will plan to proceed with outpatient angiogram today to address RCA lesions, and discharge to home after.     Patient discussed with staff cardiologist, Dr. Merlos, who agrees with the above plan.        Giovanny Bellamy PA-C  [facility] Cardiology Team

## 2021-11-05 NOTE — ED NOTES
Pt has wife at bedside. Pt placed in hospital gown, socks. Jewelry has been removed. Pt states that he had toast at 0720 this am. Pt made aware that he is NPO. Pt declined the bathroom at this time. Pt in NAD. Covid testing sent off per [facility] request.

## 2021-11-06 VITALS
BODY MASS INDEX: 34.38 KG/M2 | WEIGHT: 232.1 LBS | HEART RATE: 74 BPM | RESPIRATION RATE: 16 BRPM | TEMPERATURE: 98 F | HEIGHT: 69 IN | SYSTOLIC BLOOD PRESSURE: 136 MMHG | DIASTOLIC BLOOD PRESSURE: 86 MMHG | OXYGEN SATURATION: 97 %

## 2021-11-06 LAB
ANION GAP SERPL CALCULATED.3IONS-SCNC: 6 MMOL/L (ref 3–14)
BASOPHILS # BLD AUTO: 0.1 10E3/UL (ref 0–0.2)
BASOPHILS NFR BLD AUTO: 1 %
BUN SERPL-MCNC: 12 MG/DL (ref 7–30)
CALCIUM SERPL-MCNC: 8.9 MG/DL (ref 8.5–10.1)
CHLORIDE BLD-SCNC: 103 MMOL/L (ref 94–109)
CO2 SERPL-SCNC: 25 MMOL/L (ref 20–32)
CREAT SERPL-MCNC: 0.8 MG/DL (ref 0.66–1.25)
EOSINOPHIL # BLD AUTO: 0.1 10E3/UL (ref 0–0.7)
EOSINOPHIL NFR BLD AUTO: 1 %
ERYTHROCYTE [DISTWIDTH] IN BLOOD BY AUTOMATED COUNT: 12.7 % (ref 10–15)
GFR SERPL CREATININE-BSD FRML MDRD: >90 ML/MIN/1.73M2
GLUCOSE BLD-MCNC: 100 MG/DL (ref 70–99)
HCT VFR BLD AUTO: 43.6 % (ref 40–53)
HGB BLD-MCNC: 15.1 G/DL (ref 13.3–17.7)
IMM GRANULOCYTES # BLD: 0.1 10E3/UL
IMM GRANULOCYTES NFR BLD: 1 %
LYMPHOCYTES # BLD AUTO: 1.6 10E3/UL (ref 0.8–5.3)
LYMPHOCYTES NFR BLD AUTO: 13 %
MCH RBC QN AUTO: 32 PG (ref 26.5–33)
MCHC RBC AUTO-ENTMCNC: 34.6 G/DL (ref 31.5–36.5)
MCV RBC AUTO: 92 FL (ref 78–100)
MONOCYTES # BLD AUTO: 0.8 10E3/UL (ref 0–1.3)
MONOCYTES NFR BLD AUTO: 7 %
NEUTROPHILS # BLD AUTO: 9.3 10E3/UL (ref 1.6–8.3)
NEUTROPHILS NFR BLD AUTO: 77 %
NRBC # BLD AUTO: 0 10E3/UL
NRBC BLD AUTO-RTO: 0 /100
PLATELET # BLD AUTO: 248 10E3/UL (ref 150–450)
POTASSIUM BLD-SCNC: 3.8 MMOL/L (ref 3.4–5.3)
RBC # BLD AUTO: 4.72 10E6/UL (ref 4.4–5.9)
SODIUM SERPL-SCNC: 134 MMOL/L (ref 133–144)
WBC # BLD AUTO: 11.9 10E3/UL (ref 4–11)

## 2021-11-06 PROCEDURE — 85025 COMPLETE CBC W/AUTO DIFF WBC: CPT | Performed by: STUDENT IN AN ORGANIZED HEALTH CARE EDUCATION/TRAINING PROGRAM

## 2021-11-06 PROCEDURE — 250N000013 HC RX MED GY IP 250 OP 250 PS 637: Performed by: STUDENT IN AN ORGANIZED HEALTH CARE EDUCATION/TRAINING PROGRAM

## 2021-11-06 PROCEDURE — 999N000111 HC STATISTIC OT IP EVAL DEFER

## 2021-11-06 PROCEDURE — 80048 BASIC METABOLIC PNL TOTAL CA: CPT | Performed by: STUDENT IN AN ORGANIZED HEALTH CARE EDUCATION/TRAINING PROGRAM

## 2021-11-06 PROCEDURE — 99217 PR OBSERVATION CARE DISCHARGE: CPT | Mod: GC | Performed by: INTERNAL MEDICINE

## 2021-11-06 PROCEDURE — G0378 HOSPITAL OBSERVATION PER HR: HCPCS

## 2021-11-06 PROCEDURE — 36415 COLL VENOUS BLD VENIPUNCTURE: CPT | Performed by: STUDENT IN AN ORGANIZED HEALTH CARE EDUCATION/TRAINING PROGRAM

## 2021-11-06 RX ORDER — ESCITALOPRAM OXALATE 10 MG/1
10 TABLET ORAL DAILY
Status: DISCONTINUED | OUTPATIENT
Start: 2021-11-06 | End: 2021-11-06 | Stop reason: HOSPADM

## 2021-11-06 RX ORDER — ATORVASTATIN CALCIUM 80 MG/1
80 TABLET, FILM COATED ORAL DAILY
Qty: 90 TABLET | Refills: 3 | Status: SHIPPED | OUTPATIENT
Start: 2021-11-06 | End: 2021-12-03

## 2021-11-06 RX ORDER — PRASUGREL 10 MG/1
10 TABLET, FILM COATED ORAL DAILY
Qty: 30 TABLET | Refills: 11 | Status: SHIPPED | OUTPATIENT
Start: 2021-11-07 | End: 2021-12-03

## 2021-11-06 RX ORDER — ATORVASTATIN CALCIUM 40 MG/1
40 TABLET, FILM COATED ORAL DAILY
Status: DISCONTINUED | OUTPATIENT
Start: 2021-11-06 | End: 2021-11-06 | Stop reason: HOSPADM

## 2021-11-06 RX ORDER — METOPROLOL TARTRATE 25 MG/1
12.5 TABLET, FILM COATED ORAL 2 TIMES DAILY
Qty: 60 TABLET | Refills: 3 | Status: SHIPPED | OUTPATIENT
Start: 2021-11-06 | End: 2021-12-03

## 2021-11-06 RX ADMIN — ROSUVASTATIN CALCIUM 40 MG: 20 TABLET, FILM COATED ORAL at 08:10

## 2021-11-06 RX ADMIN — ESCITALOPRAM OXALATE 10 MG: 10 TABLET ORAL at 08:10

## 2021-11-06 RX ADMIN — ATORVASTATIN CALCIUM 40 MG: 40 TABLET, FILM COATED ORAL at 08:10

## 2021-11-06 RX ADMIN — ASPIRIN 81 MG: 81 TABLET, COATED ORAL at 08:10

## 2021-11-06 RX ADMIN — PRASUGREL 10 MG: 10 TABLET, FILM COATED ORAL at 09:13

## 2021-11-06 ASSESSMENT — MIFFLIN-ST. JEOR: SCORE: 1873.18

## 2021-11-06 NOTE — H&P
History and Physical CSI Service      Harjit Medina MRN:[id number] YOB: 1965  Date of Admission:11/5/2021  Primary care provider: Piter Maynard           Assessment and Plan:      Assessment & Plan:   Harjit Meidna is a 56 year old with PMH of severe 3v CAD s/p PCI (2 VIOLETTE to LAD, 2 VIOLETTE to LCx and OM2 on 10/13/21 and lithotripsy of LAD lesion), tobacco dependence, obesity, pre-diabetes, BARRINGTON (compliant with CPAP), and NAYELY who presents with substernal chest pressure, now s/p PCI (4 VIOLETTE to RCA and ballooning of LAD stent).     Unstable angina  Severe 3v CAD s/p PCI (10/13/21)  Presents with 2 days of intermittent, substernal and right sided non exertional chest pressure. He recently underwent PCI on 10/13 for UA in the setting of known severe 3v CAD (visualized on CTA coronary artery 10/13) s/p 2 VIOLETTE to LAD and 2 VIOLETTE to LCx and OM2, planned for staged procedure of the RCA on 11/10. In the ED, he is HDS, EKG with NSR and no ischemic changes, troponin negative. Evaluated by cardiology in the ED and taken for angiogram where 4 stents were placed to the RCA and LAD stent was ballooned.   -Bedrest per protocol   -Switch from Brilinta to Prasugrel   -PTA aspirin   -PTA rosuvastatin     Tobacco dependence (in remission)   ~30 pack years, quit 3 weeks ago.     Chronic medical conditions:   BARRINGTON: PTA CPAP   NAYELY: PTA Lexapro       CODE: FULL  DVT: Hold s/p PCI   FEN: AM BMP, Cardiac diet   Disposition: Obvs, likely discharge home in AM     Rama Tobin DO  Internal Medicine PGY3  Pager: [phone]    Patient will be staffed in the AM.     HISTORY OF PRESENT ILLNESS:  Harjit Medina is a 56 year old with PMH of severe 3v CAD s/p PCI (2 VIOLETTE to LAD, 2 VIOLETTE to LCx and OM2 on 10/13/21 and lithotripsy of LAD lesion), tobacco dependence, obesity, pre-diabetes, BARRINGTON (compliant with CPAP), and NYAELY who presents with substernal chest pressure, now s/p PCI (4 VIOLETTE to RCA and ballooning of LAD stent).     Patient  reports intermittent, substernal and right sided chest pressure over the past 2 days. Not associated with activity. Lasts ~20-40 minutes. Associated with sweating. First episode was on 11/3 after work. Went back to work on 11/4 and had intermittent episodes of chest pressure while sitting in meetings. Lasted for ~40 minutes, resolved spontaneously while driving to the ED.     He was recently admitted on 10/13 for unstable angina, taken for angiogram based on CTA with severe CAD, despite EKG without ischemic changes and negative troponin. He was found to have severe 3 vessel CAD and underwent PCI with 4 VIOLETTE (2 to LAD and 2 to LCx and OM2) and lithotripsy to the mid LAD lesion. Plan was for patient to return for PCI to RCA on 11/10/21.     In the ED: BP 150s/90s, HR 60s, EKG showed NSR with no ischemic changes. Trop negative, BNP and CBC wnl. CXR shows mild basilar opacities. He was evaluated by cardiology in the ED and decision was made to take for angiogram.       PAST MEDICAL HISTORY:    Past Medical History:   Diagnosis Date     NAYELY (generalized anxiety disorder)      Sleep apnea     cpap       Past Surgical History:   Procedure Laterality Date     COLONOSCOPY N/A 1/4/2018    Procedure: COMBINED COLONOSCOPY, SINGLE OR MULTIPLE BIOPSY/POLYPECTOMY BY BIOPSY;;  Surgeon: Leeroy Jacobsen MD;  Location: [facility] OR     COLONOSCOPY WITH CO2 INSUFFLATION N/A 1/4/2018    Procedure: COLONOSCOPY WITH CO2 INSUFFLATION;  COLON-SCREENING / LIMON ;  Surgeon: Leeroy Jacobsen MD;  Location: [facility] OR     CV CORONARY LITHOTRIPSY PCI N/A 10/13/2021    Procedure: CV Coronary Lithotripsy PCI;  Surgeon: Miki Zaragoza MD;  Location:  HEART CARDIAC CATH LAB     CV HEART CATHETERIZATION WITH POSSIBLE INTERVENTION N/A 10/13/2021    Procedure: Heart Catheterization with Possible Intervention;  Surgeon: Miki Zaragoza MD;  Location:  HEART CARDIAC CATH LAB     CV PCI STENT DRUG ELUTING N/A  10/13/2021    Procedure: Percutaneous Coronary Intervention Stent Drug Eluting;  Surgeon: Miki Zaragoza MD;  Location:  HEART CARDIAC CATH LAB        MEDICATIONS:  PTA Meds  Prior to Admission medications    Medication Sig Last Dose Taking? Auth Provider   aspirin (ASA) 81 MG chewable tablet Take 1 tablet (81 mg) by mouth daily Starting tomorrow.  Yes Cuco Muñiz MD   aspirin (ASA) 81 MG EC tablet Take 1 tablet (81 mg) by mouth daily   Ramiro Costa MD   atorvastatin (LIPITOR) 40 MG tablet Take 1 tablet (40 mg) by mouth daily   Ramiro Costa MD   escitalopram (LEXAPRO) 10 MG tablet Take 1/2 tab daily for 1 week, then increase to 1 full tab daily thereafter.  Patient taking differently: Take 10 mg by mouth daily Take 1/2 tab daily for 1 week, then increase to 1 full tab daily thereafter.   Piter Maynard PA-C   nitroGLYcerin (NITROSTAT) 0.4 MG sublingual tablet For chest pain place 1 tablet under the tongue every 5 minutes for 3 doses. If symptoms persist 5 minutes after 1st dose call 911.   Gladys Garcia PA-C   order for DME Equipment being ordered: CPAP and all necessary supplies   Piter Maynard PA-C   sildenafil (REVATIO) 20 MG tablet 2-5 tabs 1/2 to 4 hours prior to relations  Patient not taking: Reported on 9/1/2021   Piter Maynard PA-C   ticagrelor (BRILINTA) 90 MG tablet Take 1 tablet (90 mg) by mouth 2 times daily Dose to start tomorrow morning.   Odette Felder CNP   trimethoprim-polymyxin b (POLYTRIM) 55008-7.1 UNIT/ML-% ophthalmic solution Place 1-2 drops into both eyes every 4 hours  Patient not taking: Reported on 7/26/2021   Mer Whiting APRN CNP   varenicline (CHANTIX AL) 0.5 MG X 11 & 1 MG X 42 tablet Take 0.5 mg tab daily for 3 days, THEN 0.5 mg tab twice daily for 4 days, THEN 1 mg twice daily.  Patient not taking: Reported on 8/25/2021   Piter Maynard PA-C   varenicline (CHANTIX) 1 MG tablet Take 1 tablet (1 mg) by mouth 2 times  "daily  Patient not taking: Reported on 2021   Piter Maynard PA-C     ALLERGIES:    No Known Allergies    REVIEW OF SYSTEMS:  A 10 point review of systems was negative except as noted above.    SOCIAL HISTORY:   Lives in [place] with his wife  Works in sales selling RVs  Smoked 1 ppd for ~30 yrs, quit 3 weeks ago   ETOH: ~4-5 drinks 3 times per week  MJ occasionally      FAMILY MEDICAL HISTORY:   Family History   Problem Relation Age of Onset     Dementia Mother      Other Cancer Father         brain      Diabetes Brother        PHYSICAL EXAM:   /66   Pulse 93   Temp 98  F (36.7  C) (Oral)   Resp 16   Ht 1.753 m (5' 9\")   Wt 106.1 kg (234 lb)   SpO2 97%   BMI 34.56 kg/m          General: alert and no distress  Head: NC/AT  Eyes: non-icteric sclera, EOMI  Pulmonary: CTAB  CV: RRR, +s1/s2, no murmurs, no JVD   GI: soft, non-tender, non-distended + bowel sounds  Skin: no rashes seen  EXT: TR band is in place with no e/o bleeding or hematoma, no edema, WWP  Neuro: A&Ox3, no focal deficits    LABS:   CMP  Recent Labs   Lab 21  1213      POTASSIUM 4.5   CHLORIDE 102   CO2 29   ANIONGAP 4   *   BUN 14   CR 0.82   GFRESTIMATED >90   NIYA 9.3   PROTTOTAL 8.0   ALBUMIN 4.1   BILITOTAL 1.3   ALKPHOS 124   AST 24   ALT 62     CBC  Recent Labs   Lab 21  1213   HGB 15.9   WBC 10.6   RBC 4.94   HCT 45.8   MCV 93   MCH 32.2   MCHC 34.7   RDW 12.6          IMAGIN/5 CXR  IMPRESSION:  Mild  bibasilar opacities, may represent atelectasis  versus infection versus pulmonary edema.    10/13 CTA Coronary  IMPRESSION:  1.  Severe obstructive three-vessel CAD, not including left main.  Patient sent to the ED due to unstable angina.  2.  Total Agatston score 1382 placing the patient in the 99 percentile  when compared to age and gender matched control group.  3.  Please review Radiology report for incidental noncardiac findings  that will follow separately.    .  "

## 2021-11-06 NOTE — PROGRESS NOTES
Chest tightness controlled  Yes, patient denies any chest tightness since procedure    Pain control  Leona, patient denies all pain; no oral analgestia necessary    Vital signs normal ora at baseline  Yes, patient's vital signs WDL    Safe disposition plan has been identified  Yes, patient has a ride home set up and will have help at home if needed    Continuing to monitor

## 2021-11-06 NOTE — PROGRESS NOTES
DISCHARGE   Discharged to: Home  Via: Automobile  Accompanied by: Family  Discharge Instructions: diet, activity, medications, follow up appointments, when to call the MD, and what to watchout for (i.e. s/s of infection, increasing SOB, palpitations, chest pain,)  Prescriptions: To be filled by [facility] pharmacy per pt's request; medication list reviewed & sent with pt  Follow Up Appointments: arranged; information given  Belongings: All sent with pt  IV: out  Telemetry: off  Pt exhibits understanding of above discharge instructions; all questions answered.  Discharge Paperwork: faxed

## 2021-11-06 NOTE — DISCHARGE SUMMARY
[facility]    Cardiology Discharge Summary- Cardiac ICU    Date of Admission:  11/5/2021  Date of Discharge:  11/6/2021  Discharging Provider: Dr. Reyes Castro    Discharge Diagnoses      1. Unstable angina s/p PCI with 4 VIOLETTE in the RCA and LAD stent ballooned   2. Severe 3v CAD s/p PCI (10/13/21)  3. Tobacco dependence (in remission)     Follow-ups Needed After Discharge   -Follow up with your PCP in one week  -Follow up with your primary cardiologist in 3-4 weeks    Unresulted Labs Ordered in the Past 30 Days of this Admission     No orders found for last 31 day(s).        Brief HPI:  Harjit Medina is a 56 year old with PMH of severe 3v CAD s/p PCI (2 VIOLETTE to LAD, 2 VIOLETTE to LCx and OM2 on 10/13/21 and lithotripsy of LAD lesion), tobacco dependence, obesity, pre-diabetes, BARRINGTON (compliant with CPAP), and NAYELY who presents with substernal chest pressure, now s/p PCI (4 VIOLETTE to RCA and ballooning of LAD stent).      Patient reports intermittent, substernal and right sided chest pressure over the past 2 days. Not associated with activity. Lasts ~20-40 minutes. Associated with sweating. First episode was on 11/3 after work. Went back to work on 11/4 and had intermittent episodes of chest pressure while sitting in meetings. Lasted for ~40 minutes, resolved spontaneously while driving to the ED.      He was recently admitted on 10/13 for unstable angina, taken for angiogram based on CTA with severe CAD, despite EKG without ischemic changes and negative troponin. He was found to have severe 3 vessel CAD and underwent PCI with 4 VIOLETTE (2 to LAD and 2 to LCx and OM2) and lithotripsy to the mid LAD lesion. Plan was for patient to return for PCI to RCA on 11/10/21.      In the ED: BP 150s/90s, HR 60s, EKG showed NSR with no ischemic changes. Trop negative, BNP and CBC wnl. CXR shows mild basilar opacities. He was evaluated by cardiology in the ED and decision was made to take  for angiogram.       Hospital Course by Diagnosis:    Unstable angina  Severe 3v CAD s/p PCI (10/13/21)  Presents with 2 days of intermittent, substernal and right sided non exertional chest pressure. He recently underwent PCI on 10/13 for UA in the setting of known severe 3v CAD (visualized on CTA coronary artery 10/13) s/p 2 VIOLETTE to LAD and 2 VIOLETTE to LCx and OM2, planned for staged procedure of the RCA on 11/10. In the ED, he is HDS, EKG with NSR and no ischemic changes, troponin negative. Evaluated by cardiology in the ED and taken for angiogram where 4 stents were placed to the RCA and LAD stent was ballooned. This morning he seems doing well and was able to discharge home after meeting post procedure discharge criteria.  -Switch from Brilinta to Prasugrel   -continue PTA aspirin   -increase PTA atorvastatin from 40 to 80 mg  - weight loss and heart healthy diety encouraged  - Cardiac rehab  - Follow up with cardiology in 2-3 weeks  - Follow up with your PCP in less than 1 week      Tobacco dependence (in remission)   ~30 pack years, quit 3 weeks ago.      Chronic medical conditions:   BARRINGTON: PTA CPAP   NAYELY: PTA Lexapro             Discharge Disposition   Discharged to home  Condition at discharge: Stable        Consultations This Hospital Stay   NUTRITION SERVICES ADULT IP CONSULT  CARDIAC REHAB IP CONSULT  PHARMACY IP CONSULT  PHARMACY IP CONSULT  SMOKING CESSATION PROGRAM IP CONSULT    Code Status   Full Code        Jorge Reyes Castro, MD  [facility]  ______________________________________________________________________    Physical Exam   Vital Signs: Temp: 98  F (36.7  C) Temp src: Oral BP: 136/86 Pulse: 74   Resp: 16 SpO2: 97 % O2 Device: None (Room air) Oxygen Delivery: 2 LPM  Weight: 232 lbs 1.6 oz  Constitutional: awake, alert, cooperative, no apparent distress  Eyes: Lids and lashes normal, pupils equal, round and reactive to light, extra ocular muscles intact,  sclera clear, conjunctiva normal  ENT: Normocephalic, without obvious abnormality, atraumatic.  Hematologic / Lymphatic: no supraclavicular lymphadenopathy  Respiratory: No increased work of breathing, good air exchange, clear to auscultation bilaterally, No crackles or wheezing  Cardiovascular: Normal apical impulse, regular rate and rhythm. No JVD. No peripheral leg swelling  GI: No scars, normal bowel sounds, soft, non-distended, non-tender.  Skin: warm to touch. No signs of excoriations in extremities.   Musculoskeletal: There is no redness, warmth, or swelling of the joints.  Full range of motion noted.  Neurologic: Awake, alert, oriented to name, place and time.  Cranial nerves II-XII are grossly intact.     Primary Care Physician   Piter Maynard    Discharge Orders      Follow-Up with Cardiac Advanced Practice Provider      CARDIAC REHAB REFERRAL      Medication Instructions - Anticoagulants    Do NOT stop your aspirin or platelet inhibitor unless directed by your Cardiologist.  These medications help to prevent platelets in your blood from sticking together and forming a clot.  Examples of these medications are:  Ticagrelor (Brilinta), Clopidigrel (Plavix), Prasugrel (Effient)     When to call - Contact the Heart Clinic    You may experience symptoms that require follow-up before your scheduled appointment. Contact the Heart Clinic if you develop: Fever over 100.4o Fahrenheit, that lasts more than one day; Redness, heat, or pus at the puncture site; Change in color or temperature in your hand or arm.     When to call - Reasons to Call 911    If your wrist puncture site starts bleeding after discharge, sit down and apply firm pressure with your thumb against the puncture site and fingers against the back of the wrist for 10 minutes. If the bleeding stops, continue to rest, keeping your wrist still for 2 hours. Notify your doctor as soon as possible.  IF BLEEDING DOES NOT STOP OR THERE IS A LARGER AMOUNT OF  BLEEDING OR SPURTING CALL 9-1-1 immediately.DO NOT drive yourself to the hospital.     Precautions - Lifting    DO NOT lift more than 5 pounds with affected arm for 48 hours     Precautions - Household Activities    Avoid excessive bending or movement of your wrist for 72 hours.  Do not subject hand/arm to any forceful movements for 24 hours, such as supporting weight when rising from a chair or bed.     Remove the band-aid on the puncture site after 24 hours and leave open to air. If minor oozing, you may apply a band-aid and remove after 12 hours.     Precautions - Active Sports Activities    DO NOT engage in vigorous exercise using your affected arm for 3 days after discharge.  This includes golf, tennis or swimming.     Precautions - Operating yard equipment or vehicles    Do not operate a chainsaw, lawnmower, motorcycle, or all-terrain vehicle for 48 hours after the procedure.     Precautions - Elective Dental Work    NO elective dental work for 6 weeks after receiving a stent.     Comfort and Pain Management - Bruising after Surgery    Expect mild tingling of hand and tenderness at the wrist puncture site for up to 3 days. You may take Tylenol or a pain medicine recommended by your doctor.     Activity - Cardiac Rehab    You are encouraged to enroll in an Outpatient Cardiac Rehab program after discharge from the hospital.  Our Cardiac Rehab staff may visit briefly with you while you're in the hospital.  If they miss you, someone will contact you after you are home.     Return to Driving    Driving is NOT permitted for 24 hours after surgery     Return to work    You may return to work after 72 hours if you are feeling well and your job does not involve heavy lifting.     Shower / Bathing    You may shower on the day after your procedure.  DO NOT soak of wrist with the puncture site in water for 3 days to prevent infection. DO NOT take a tub bath or wash dishes for 3 days after the procedure     Dressing  Removal    Remove the band-aid on the puncture site after 24 hours and leave open to air. If minor oozing, you may apply a band-aid and remove after 12 hours     Reason for your hospital stay    Heart attack     Activity    Your activity upon discharge: no driving for the first 24 hours after the procedure. Follow up with your primary care to discuss when to return to work     Follow Up ([facility])    Follow up with primary care provider, Piter Maynard, within 7 days for hospital follow- up.   Follow up with Cardiology in 2 weeks    Appointments on [place] and/or [facility] (with [facility] or [facility] provider or service). Call [phone] if you haven't heard regarding these appointments within 7 days of discharge.     Diet    Follow this diet upon discharge: Regular       Significant Results and Procedures   Most Recent 3 CBC's:Recent Labs   Lab Test 11/06/21  0611 11/05/21  1213 10/13/21  1055   WBC 11.9* 10.6 9.6   HGB 15.1 15.9 16.7   MCV 92 93 94    281 271     Most Recent 3 BMP's:Recent Labs   Lab Test 11/06/21  0611 11/05/21  1213 10/13/21  1055    135 136   POTASSIUM 3.8 4.5 4.6   CHLORIDE 103 102 101   CO2 25 29 31   BUN 12 14 8   CR 0.80 0.82 0.87   ANIONGAP 6 4 4   NIYA 8.9 9.3 8.7   * 110* 94     Most Recent 2 LFT's:Recent Labs   Lab Test 11/05/21  1213 10/13/21  1055   AST 24 40   ALT 62 37   ALKPHOS 124 92   BILITOTAL 1.3 1.7*     Most Recent 3 INR's:No lab results found.    Discharge Medications   Current Discharge Medication List      START taking these medications    Details   metoprolol tartrate (LOPRESSOR) 25 MG tablet Take 0.5 tablets (12.5 mg) by mouth 2 times daily  Qty: 60 tablet, Refills: 3    Associated Diagnoses: Unstable angina (H); Coronary artery calcification seen on CAT scan; Percutaneous transluminal coronary angioplasty status      prasugrel (EFFIENT) 10 MG TABS tablet Take 1 tablet (10 mg) by mouth daily  Qty: 30 tablet, Refills: 11    Associated Diagnoses:  Unstable angina (H); Coronary artery calcification seen on CAT scan; Percutaneous transluminal coronary angioplasty status         CONTINUE these medications which have CHANGED    Details   atorvastatin (LIPITOR) 80 MG tablet Take 1 tablet (80 mg) by mouth daily  Qty: 90 tablet, Refills: 3    Associated Diagnoses: Coronary artery calcification seen on CAT scan         CONTINUE these medications which have NOT CHANGED    Details   aspirin (ASA) 81 MG EC tablet Take 1 tablet (81 mg) by mouth daily  Qty: 90 tablet, Refills: 3    Associated Diagnoses: Coronary artery calcification seen on CAT scan      escitalopram (LEXAPRO) 10 MG tablet Take 1/2 tab daily for 1 week, then increase to 1 full tab daily thereafter.  Qty: 30 tablet, Refills: 1    Associated Diagnoses: NAYELY (generalized anxiety disorder)      nitroGLYcerin (NITROSTAT) 0.4 MG sublingual tablet For chest pain place 1 tablet under the tongue every 5 minutes for 3 doses. If symptoms persist 5 minutes after 1st dose call 911.  Qty: 25 tablet, Refills: 11    Associated Diagnoses: Coronary artery disease of native artery of native heart with stable angina pectoris (H)      order for DME Equipment being ordered: CPAP and all necessary supplies  Qty: 1 Device, Refills: 0    Associated Diagnoses: BARRINGTON (obstructive sleep apnea)         STOP taking these medications       sildenafil (REVATIO) 20 MG tablet Comments:   Reason for Stopping:         ticagrelor (BRILINTA) 90 MG tablet Comments:   Reason for Stopping:         trimethoprim-polymyxin b (POLYTRIM) 53519-9.1 UNIT/ML-% ophthalmic solution Comments:   Reason for Stopping:         varenicline (CHANTIX AL) 0.5 MG X 11 & 1 MG X 42 tablet Comments:   Reason for Stopping:         varenicline (CHANTIX) 1 MG tablet Comments:   Reason for Stopping:             Allergies   No Known Allergies

## 2021-11-06 NOTE — PROGRESS NOTES
Observation Goals:     1. Chest tightness controlled  Yes, patient denies any chest tightness since procedure     2. Adequate pain control on oral analgesia  Yes, patient endorses slight discomfort at radial site, relieved after PRN tylenol and with removal of air from TR band     3. Vital signs normal or at baseline baseline  Yes, patient's vital signs WNL     4. Safe disposition plan has been identified  Yes, patient has a ride home set up and will have help at home if needed     Continue to monitor.

## 2021-11-06 NOTE — PROGRESS NOTES
D/I/A: Pt roomed on 3C in bay [address].  Arrived via litter and accompanied by CL RN. On/Off: On monitor.  VSSA.  Rhythm upon arrival SR on monitor.  Denies pain or sob.  Reviewed activity restrictions and when to notify RN, ie-changes to breathing or increased chest pressure or chest pain.  CCL access:  R TR band with 17 ml of air. CMS intact.  P: Continue to monitor status.  Discharge to home once meeting criteria.

## 2021-11-06 NOTE — PROGRESS NOTES
Pt brought to 6c on monitor. VS remained stable. TR band deflation was begun on 3c, RRA site remained intact. Pt is feeling well and has eaten, drunk, and voided.

## 2021-11-06 NOTE — PROGRESS NOTES
Observation Goals:     1. Chest tightness controlled  Yes, patient denies any chest tightness since procedure     2. Adequate pain control on oral analgesia  Yes, patient denies pain     3. Vital signs normal or at baseline baseline  Yes, patient's vital signs WNL     4. Safe disposition plan has been identified  Yes, patient has a ride home set up and will have help at home if needed     Continuing to monitor.

## 2021-11-06 NOTE — DISCHARGE INSTRUCTIONS
Discharge Instructions:  1. Stop Brilinta  2. start Prasugrel 10mg once daily.  New Rx sent to discharge pharmacy here.  2. Start 12.5mg Metorpolol tartrate BID  4. Increase Lipitor to 80mg once daily.      Going Home after Coronary Angioplasty or Stent Placement     FOR 24 HOURS:         Have an adult stay with you for 24 hours.         Relax and take it easy.         Drink plenty of fluids.         Do NOT make any important or legal decisions.         Do NOT drive or operate machines at home or at work.         Do NOT drink alcohol.     PROCEDURE SITE:  Care of wrist or arm site:         It is normal to have soreness at the puncture site and mild tingling in your hand for up to 3 days.         Remove the Band-Aid after 24 hours. If there is minor oozing, apply another Band-aid and remove it after 12 hours.          Do NOT take a bath, or use a hot tub or pool for the next 48 hours. You may shower.          It is normal to have a small bruise.  There should not be a lump at the site.         Do not scrub the site.         Do not use lotion or powder near the puncture site for 3 days.         For 2 days, do not use your hand or arm to support your weight (such as rising from a chair) or bend your wrist (such as lifting a garage door).         For 2 days, do not lift more than 5 pounds or exercise your arm (tennis, golf or bowling).    If you start bleeding from the site in your arm: Sit down and press firmly on the site with your fingers for 10 minutes. Call your doctor as soon as you can.    Call 911 right away if you have bleeding that is heavy or does not stop.      MEDICATIONS:    1. You have begun Effient (prasugrel), do not stop taking it until you talk to your heart doctor (cardiologist). This medication is essential for your stents. Do not stop it without speaking first to your heart doctor or their nurse- this includes if you have concerns about side effects or cost. Also, if another health provider tells  you to stop it, let them know they need to discuss it with your heart doctor.   2. If you are on metformin (Glucophage), do not restart it until you have blood tests (within 2 to 3 days after discharge). When your doctor tells you it is safe, you may restart the metformin.   3. If you have not been continued on other medications you were previously taking at home it is probably for reason though please discuss your concerns with any of your doctors.     CARDIAC REHAB:  After the initial healing process of the procedure site, we recommend cardiac rehabilitation for all heart attack and stent patients. Cardiac rehabilitation will help you:  - Rebuild stamina, strength and balance.  - Learn how to participate in activities safely, as well as help you regain confidence to do so.  - Return to activities of daily living and leisure.  You should receive a call from them within the next week, but if you do not or you would like to call you can contact their central scheduling at [phone]    DIET:  We recommend a diet low in saturated fat, trans fat and cholesterol. In addition it will be helpful to be cautious of sodium intake, sugar and carbohydrates. Try to increase the amount of lean meats you eat like fish and chicken, but avoid frying; and reduce the amount of red meat you eat. Eat more fresh fruits and vegetables and try to avoid canned and processed food. Please reference the handouts you received for more specific information.    OTHER INFORMATION:  1. Consider having your family members learn CPR if they do not know it already.  2. If you are a smoker, quitting smoking will be one of the most important things you can do for yourself. There are nicotine replacement options or medications they might be able to be prescribed. Please discuss this with your doctors. Consider calling the [employer]uit[employer] at [phone] ([phone]) as they can offer ongoing support after discharge.    CALL YOUR DOCTOR IF:  -You have a large or  growing lump/bump around the procedure site  -The site is red, swollen, hot, tender or has drainage  -You have hives, a rash or unusual itching  -You have increasing or worsening shortness of breath or chest pain    Should you need to contact us:  Cardiology clinic for scheduling or triage nurse questions/concerns:  [phone]

## 2021-11-06 NOTE — PLAN OF CARE
OT/CR-6C: Defer. Pt with no acute OT needs at this time. Pt mobilizing independently and feeling much better post procedure. No needs upon screening. Will complete OT/CR order and defer. Pt to follow up with OP CR upon discharge as he has a OP CR appointment on 11/8 per patient report.

## 2021-11-06 NOTE — PROGRESS NOTES
Observation Goals:    1. Chest tightness controlled  Yes, patient denies any chest tightness since procedure    2. Adequate pain control on oral analgesia  Yes, patient denies pain    3. Vital signs normal or at baseline baseline  Yes, patient's vitals WNL    4. Safe disposition plan has been identified  Yes, patient has a ride home and will have help if needed at home    Continue to monitor.

## 2021-11-08 ENCOUNTER — HOSPITAL ENCOUNTER (OUTPATIENT)
Dept: CARDIAC REHAB | Facility: CLINIC | Age: 56
End: 2021-11-08
Attending: NURSE PRACTITIONER
Payer: COMMERCIAL

## 2021-11-08 DIAGNOSIS — I20.0 UNSTABLE ANGINA (H): ICD-10-CM

## 2021-11-08 LAB
ATRIAL RATE - MUSE: 79 BPM
ATRIAL RATE - MUSE: 92 BPM
DIASTOLIC BLOOD PRESSURE - MUSE: NORMAL MMHG
DIASTOLIC BLOOD PRESSURE - MUSE: NORMAL MMHG
INTERPRETATION ECG - MUSE: NORMAL
INTERPRETATION ECG - MUSE: NORMAL
P AXIS - MUSE: 25 DEGREES
P AXIS - MUSE: 36 DEGREES
PR INTERVAL - MUSE: 188 MS
PR INTERVAL - MUSE: 202 MS
QRS DURATION - MUSE: 104 MS
QRS DURATION - MUSE: 98 MS
QT - MUSE: 350 MS
QT - MUSE: 380 MS
QTC - MUSE: 432 MS
QTC - MUSE: 435 MS
R AXIS - MUSE: 50 DEGREES
R AXIS - MUSE: 69 DEGREES
SYSTOLIC BLOOD PRESSURE - MUSE: NORMAL MMHG
SYSTOLIC BLOOD PRESSURE - MUSE: NORMAL MMHG
T AXIS - MUSE: 24 DEGREES
T AXIS - MUSE: 25 DEGREES
VENTRICULAR RATE- MUSE: 79 BPM
VENTRICULAR RATE- MUSE: 92 BPM

## 2021-11-08 PROCEDURE — 93798 PHYS/QHP OP CAR RHAB W/ECG: CPT

## 2021-11-10 ENCOUNTER — HOSPITAL ENCOUNTER (OUTPATIENT)
Dept: CARDIAC REHAB | Facility: CLINIC | Age: 56
End: 2021-11-10
Attending: NURSE PRACTITIONER
Payer: COMMERCIAL

## 2021-11-10 ENCOUNTER — OFFICE VISIT (OUTPATIENT)
Dept: FAMILY MEDICINE | Facility: CLINIC | Age: 56
End: 2021-11-10
Payer: COMMERCIAL

## 2021-11-10 VITALS
BODY MASS INDEX: 34.53 KG/M2 | WEIGHT: 233.8 LBS | HEART RATE: 79 BPM | DIASTOLIC BLOOD PRESSURE: 75 MMHG | RESPIRATION RATE: 18 BRPM | SYSTOLIC BLOOD PRESSURE: 115 MMHG | OXYGEN SATURATION: 97 % | TEMPERATURE: 97.5 F

## 2021-11-10 DIAGNOSIS — Z11.4 SCREENING FOR HIV (HUMAN IMMUNODEFICIENCY VIRUS): ICD-10-CM

## 2021-11-10 DIAGNOSIS — E66.01 MORBID OBESITY (H): ICD-10-CM

## 2021-11-10 DIAGNOSIS — I25.10 CORONARY ARTERY DISEASE INVOLVING NATIVE CORONARY ARTERY OF NATIVE HEART WITHOUT ANGINA PECTORIS: Primary | ICD-10-CM

## 2021-11-10 PROCEDURE — 93798 PHYS/QHP OP CAR RHAB W/ECG: CPT

## 2021-11-10 PROCEDURE — 99212 OFFICE O/P EST SF 10 MIN: CPT | Performed by: PHYSICIAN ASSISTANT

## 2021-11-10 NOTE — PROGRESS NOTES
Subjective   Harjit is a 56 year old who presents for the following health issues   HPI       Hospital Follow-up Visit:    Hospital/Nursing Home/IP Rehab Facility: [facility]  Date of Admission: 11/5/21  Date of Discharge: 11/6/21  Reason(s) for Admission: Coronary artery calcification on CT scan      Was your hospitalization related to COVID-19? No   Problems taking medications regularly:  None  Medication changes since discharge: None  Problems adhering to non-medication therapy:  None    Summary of hospitalization:  CareEverywhere information obtained and reviewed  Diagnostic Tests/Treatments reviewed.  Follow up needed: none  Other Healthcare Providers Involved in Patient s Care:         cardiology  Update since discharge: improved.  Post Discharge Medication Reconciliation: discharge medications reconciled, continue medications without change.  Plan of care communicated with patient     Had 7 stents placed.   Attending cardiac rehab.     No chest pain no palpitations. No fevers. No cough. No body aches. No edema    Review of Systems   Constitutional, HEENT, cardiovascular, pulmonary, GI, , musculoskeletal, neuro, skin, endocrine and psych systems are negative, except as otherwise noted.      Objective    /75   Pulse 79   Temp 97.5  F (36.4  C) (Tympanic)   Resp 18   Wt 106.1 kg (233 lb 12.8 oz)   SpO2 97%   BMI 34.53 kg/m    Body mass index is 34.53 kg/m .  Physical Exam   Eye exam - right eye normal lid, conjunctiva, cornea, pupil and fundus, left eye normal lid, conjunctiva, cornea, pupil and fundus.  Thyroid not palpable, not enlarged, no nodules detected.  CHEST:chest clear to IPPA, no tachypnea, retractions or cyanosis and S1, S2 normal, no murmur, no gallop, rate regular.  No edema    Harjit was seen today for hospital f/u.    Diagnoses and all orders for this visit:    Coronary artery disease involving native coronary artery of native heart  without angina pectoris    Screening for HIV (human immunodeficiency virus)    Morbid obesity (H)      work on lifestyle modification

## 2021-11-15 ENCOUNTER — HOSPITAL ENCOUNTER (OUTPATIENT)
Dept: CARDIAC REHAB | Facility: CLINIC | Age: 56
End: 2021-11-15
Attending: NURSE PRACTITIONER
Payer: COMMERCIAL

## 2021-11-15 PROCEDURE — 93798 PHYS/QHP OP CAR RHAB W/ECG: CPT

## 2021-11-17 ENCOUNTER — HOSPITAL ENCOUNTER (OUTPATIENT)
Dept: CARDIAC REHAB | Facility: CLINIC | Age: 56
End: 2021-11-17
Attending: NURSE PRACTITIONER
Payer: COMMERCIAL

## 2021-11-17 PROCEDURE — 93798 PHYS/QHP OP CAR RHAB W/ECG: CPT

## 2021-11-17 PROCEDURE — 93797 PHYS/QHP OP CAR RHAB WO ECG: CPT | Mod: XU

## 2021-11-24 ENCOUNTER — HOSPITAL ENCOUNTER (OUTPATIENT)
Dept: CARDIAC REHAB | Facility: CLINIC | Age: 56
End: 2021-11-24
Attending: NURSE PRACTITIONER
Payer: COMMERCIAL

## 2021-11-24 PROCEDURE — 93798 PHYS/QHP OP CAR RHAB W/ECG: CPT

## 2021-11-29 ENCOUNTER — HOSPITAL ENCOUNTER (OUTPATIENT)
Dept: CARDIAC REHAB | Facility: CLINIC | Age: 56
End: 2021-11-29
Attending: NURSE PRACTITIONER
Payer: COMMERCIAL

## 2021-11-29 PROCEDURE — 93798 PHYS/QHP OP CAR RHAB W/ECG: CPT

## 2021-12-01 ENCOUNTER — HOSPITAL ENCOUNTER (OUTPATIENT)
Dept: CARDIAC REHAB | Facility: CLINIC | Age: 56
End: 2021-12-01
Attending: NURSE PRACTITIONER
Payer: COMMERCIAL

## 2021-12-01 PROCEDURE — 93798 PHYS/QHP OP CAR RHAB W/ECG: CPT

## 2021-12-01 PROCEDURE — 93797 PHYS/QHP OP CAR RHAB WO ECG: CPT | Mod: XU

## 2021-12-02 NOTE — PROGRESS NOTES
Chief Complaint: Coronary artery calcifications on CT     HPI: Harjit Medina is a 56 year old male with a past medical history of obstructive sleep apnea (on CPAP) who was referred to me for evaluation of incidentally discovered coronary artery calcifications on lung cancer screening CT by Mr. Piter Maynard.     Briefly, Mr. Medina had a lung cancer screening CT earlier this summer. This was unremarkable for high-risk lung lesions but he was found to have extensive coronary artery calcifications. In view of this, he was referred to Cardiology for further evaluation.    At the time of the consultation, Mr. Medina notes that he has had brief periods of chest tightness while sitting down.  The chest tightness lasts for less than a minute on each episode and he has about one episode per week.  He noticed the first episode about 6 months ago.  He has no relieving or exacerbating factors.  There is no radiation.  Mr. Medina notes an absence of chest pain with exertion, dyspnea at rest or with exertion, PND, orthopnea, peripheral edema, palpitations, lightheadedness or syncope.  Mr. Mercedes notes that he has 2 large [place] jeffery's which he takes walking at least 2 miles per day.  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  Mr. Medina has never had any exertional chest tightness during these activities.    A comprehensive ROS was done and the details are included above in the HPI.    Interval History (10/13/2021):  Since his last visit, Mr. Medina, underwent his coronary CTA.  The day before his course he taken it, he had a very close friend from childhood passed away.  He was extremely distressed by this.  Shortly after undergoing his CTA, Mr. Medina described having constant, substernal chest pressure.  He was subsequent sent to the cardiac catheterization laboratory where he was noted to have three-vessel coronary disease on invasive coronary  angiogram.  He underwent lithotripsy of his mid LAD lesion on the day as well as PCI to the left circumflex and OM 2.  I had followed up with him by phone on October 14, at which point he had noted that he had complete resolution of all of his chest pain type symptoms.  He was also noted to have severe disease of his right coronary artery but this was deferred for staged PCI.  While waiting for his PCI, he began to experience the symptoms again on November 5 (approximately 2 weeks later).  This prompted an ER visit, when he ended up getting his RCA PCI.  I again followed up with him by phone on November 8 and he mentioned that he had complete resolution of all his symptoms that as well.    Since then, Mr. Medina notes that he is progressing well with cardiac rehabis walking two miles daily with his two Portuguese shepherds. He has occasional chest fullness at night, but only when at rest while watching television. It resolves spontaneously after a few minutes and deep breathing helps to speed up the resolution. There is no exertional component. He had one episode of bleeding when he cut his finger recently, but has not had any sequelae of bleeding other than that.  He has not missed any doses of his aspirin or prasugrel.    A comprehensive ROS was done and the details are included above in the HPI.      Past Medical History:  - No prior history of hypertension, T2DM, dyslipidemia, CAD, TIA/stroke, vascular aneurysms, PAD  Past Medical History:   Diagnosis Date     NAYELY (generalized anxiety disorder)      Sleep apnea     cpap       Past Surgical History:    Past Surgical History:   Procedure Laterality Date     COLONOSCOPY N/A 1/4/2018    Procedure: COMBINED COLONOSCOPY, SINGLE OR MULTIPLE BIOPSY/POLYPECTOMY BY BIOPSY;;  Surgeon: Leeroy Jacobsen MD;  Location: [facility] OR     COLONOSCOPY WITH CO2 INSUFFLATION N/A 1/4/2018    Procedure: COLONOSCOPY WITH CO2 INSUFFLATION;  COLON-SCREENING / LIMON ;  Surgeon:  Leeroy Jacobsen MD;  Location: [facility] OR     CV CORONARY ANGIOGRAM  11/5/2021    Procedure: CV CORONARY ANGIOGRAM;  Surgeon: Dalton Oliva MD;  Location:  [facility] CARD[facility] CATH LAB     CV CORONARY LITHOTRIPSY PCI N/A 10/13/2021    Procedure: CV Coronary Lithotripsy PCI;  Surgeon: Miki Zaragoza MD;  Location:  HEART CARDIAC CATH LAB     CV HEART CATHETERIZATION WITH POSSIBLE INTERVENTION N/A 10/13/2021    Procedure: Heart Catheterization with Possible Intervention;  Surgeon: Miki Zaragoza MD;  Location:  HEART CARDIAC CATH LAB     CV INTRAVASULAR ULTRASOUND N/A 11/5/2021    Procedure: Intravascular Ultrasound;  Surgeon: Dalton Oliva MD;  Location: [facility] CARDIAC CATH LAB     CV PCI ANGIOPLASTY N/A 11/5/2021    Procedure: Percutaneous Transluminal Angioplasty;  Surgeon: Dalton Oliva MD;  Location: [facility] CARDIAC CATH LAB     CV PCI STENT DRUG ELUTING N/A 10/13/2021    Procedure: Percutaneous Coronary Intervention Stent Drug Eluting;  Surgeon: Miki Zaragoza MD;  Location:  HEART CARDIAC CATH LAB       Drug History:  Home cardiac meds: Aspirin 81 mg once daily, atorvastatin 80 mg once daily, metoprolol tartrate 12.5 mg twice daily, prasugrel 10 mg once daily.  Current Outpatient Medications   Medication Sig Dispense Refill     aspirin (ASA) 81 MG EC tablet Take 1 tablet (81 mg) by mouth daily 90 tablet 3     atorvastatin (LIPITOR) 80 MG tablet Take 1 tablet (80 mg) by mouth daily 90 tablet 3     metoprolol tartrate (LOPRESSOR) 25 MG tablet Take 0.5 tablets (12.5 mg) by mouth 2 times daily 60 tablet 3     order for DME Equipment being ordered: CPAP and all necessary supplies 1 Device 0     prasugrel (EFFIENT) 10 MG TABS tablet Take 1 tablet (10 mg) by mouth daily 30 tablet 11     escitalopram (LEXAPRO) 10 MG tablet Take 1/2 tab daily for 1 week, then increase to 1 full tab daily thereafter. (Patient taking differently: Take 10 mg  "by mouth daily Take 1/2 tab daily for 1 week, then increase to 1 full tab daily thereafter.) 30 tablet 1     nitroGLYcerin (NITROSTAT) 0.4 MG sublingual tablet For chest pain place 1 tablet under the tongue every 5 minutes for 3 doses. If symptoms persist 5 minutes after 1st dose call 911. 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   Social History     Tobacco Use     Smoking status: Current Every Day Smoker     Packs/day: 1.00     Types: Cigarettes     Smokeless tobacco: Current User     Last attempt to quit: 9/1/2018   Substance Use Topics     Alcohol use: Yes     Comment: occ -        No Known Allergies      Physical Examination:  Vitals: /75 (BP Location: Right arm, Patient Position: Chair, Cuff Size: Adult Large)   Pulse 70   Ht 1.785 m (5' 10.28\")   Wt 108.9 kg (240 lb 1.6 oz)   SpO2 97%   BMI 34.18 kg/m    BMI= Body mass index is 34.18 kg/m .    GENERAL: Healthy, alert and no distress  Eyes: No xanthelasmas.  NECK: No palpable neck masses/goitre and no evidence of retrosternal goitre.   ENT: Moist mucosal membranes.  RESPIRATORY: No signs of resp distress. Lungs CTAB.  CARDIOVASCULAR:  No JVD, regular, normal S1+S2 without added sounds or murmurs.  ABDOMEN: ND, soft, non-tender, normal bowel sounds.  EXTREMITIES: Warm, well-perfused, no edema.   NEUROLOGY: GCS 15/15, no focal deficits.  VASC: No carotid bruits. Carotid, radial, brachial, popliteal, dorsalis pedis and posterior tibialis pulses are normal in volumes and symmetric bilaterally. RRA access site without thrill or bruit. NVI in C5-C8 myotomes/dermatomes. Good CRT.  SKIN: No ecchymoses, no rashes.  PSYCH: Cooperative, pleasant affect. "       Investigations:  I personally viewed and interpreted the following investigations:    Labs:    CBC RESULTS:  Lab Results   Component Value Date    WBC 11.9 (H) 11/06/2021    WBC 15.0 (H) 09/27/2016    RBC 4.72 11/06/2021    RBC 4.81 09/27/2016    HGB 15.1 11/06/2021    HGB 15.8 09/27/2016    HCT 43.6 11/06/2021    HCT 46.1 09/27/2016    MCV 92 11/06/2021    MCV 96 09/27/2016    MCH 32.0 11/06/2021    MCH 32.8 09/27/2016    MCHC 34.6 11/06/2021    MCHC 34.3 09/27/2016    RDW 12.7 11/06/2021    RDW 12.8 09/27/2016     11/06/2021     09/27/2016       CMP RESULTS:  Lab Results   Component Value Date     11/06/2021     05/01/2019    POTASSIUM 3.8 11/06/2021    POTASSIUM 4.7 05/01/2019    CHLORIDE 103 11/06/2021    CHLORIDE 103 05/01/2019    CO2 25 11/06/2021    CO2 25 05/01/2019    ANIONGAP 6 11/06/2021    ANIONGAP 9 05/01/2019     (H) 11/06/2021    GLC 88 05/01/2019    BUN 12 11/06/2021    BUN 14 05/01/2019    CR 0.80 11/06/2021    CR 0.91 05/01/2019    GFRESTIMATED >90 11/06/2021    GFRESTIMATED >60 10/13/2021    GFRESTIMATED >90 05/01/2019    GFRESTBLACK >90 05/01/2019    NIYA 8.9 11/06/2021    NIYA 9.2 05/01/2019    BILITOTAL 1.3 11/05/2021    ALBUMIN 4.1 11/05/2021    ALKPHOS 124 11/05/2021    ALT 62 11/05/2021    AST 24 11/05/2021          LIPIDS:  Lab Results   Component Value Date    CHOL 214 05/01/2019     Lab Results   Component Value Date    HDL 44 05/01/2019     Lab Results   Component Value Date     05/01/2019     Lab Results   Component Value Date    TRIG 254 05/01/2019     Lab Results   Component Value Date    CHOLHDLRATIO 5.4 11/05/2015       Recent Tests:    Electrocardiogram (09/01/2021):  Normal sinus rhythm, normal axis, normal intervals, poor R wave progression.      CT Lung Cancer Screening 07/28/2021:                                                                   IMPRESSION:   1. ACR Assessment Category:  Lung-RADS Category 2. Benign appearance or  behavior. Recommendation: Continue annual screening, if clinically relevant (please order exam code IMG 2290). .   2. Significant Incidental Finding(s):  Category S: Yes.  *  Extensive atherosclerotic vascular calcification of the coronary arteries and thoracic aorta.    *  Hepatic steatosis.  *  Cholelithiasis without CT evidence of acute cholecystitis.    Coronary Angiogram 10/13/2021:  1.  Severe three vessel CAD.  2. Lithotripsy of the mid LAD lesion.  3. PCI with four drug eluting stents [two to the LAD (2.75x38 and 3.5x12) and two to the LCx (proximal 3.0 x 16 and mid-distal with 2.5 x 48 mm) and OM2).      Coronary Angiogram 11/05/2021:   1.Successful Balloon angioplasty to the under expanded stent in the LAD as seen on IVUS.  2. Successful PCI of the distal to proximal RCA with 4 stents (2.5 x 16 mm stent in dRCA, 2.5 x 16 and 2.5 x 24 mm stents in mRCA, and 3.0 x 32 mm stents in proximal RCA). There was residual disease that needs to be treated medically.    Assessment and Plan:   Harjit Medina is a 56 year old male with a past medical history of smoking and incidentally discovered coronary calcification on a lung cancer screening CT was initially referred to me in September 2021 for evaluation of the coronary calcifications.    Mr. Medina has had a excellent symptomatic response to his multivessel PCI.  However, he still has infrequent episodes of chest pain that are atypical in that they are completely unrelated to exertion. While I am not sure that they are anginal in nature, he can certainly tolerate metoprolol tartrate at a slightly higher dose as a therapeutic trial. This would be helpful to do while he is continuing cardiac rehab.     Problems:  1. Coronary artery disease, status post PCI of mLAD, LCx, and OM2 (10/13/2021) and RCA (11/05/2021)   2. Current smoking  3. Obesity    Plan:  - Increase metoprolol tartrate to 25 mg twice daily  - Continue taking prasugrel 10 mg daily till and  including November 5, 2022  - Continue taking aspirin 81 mg lifelong and atorvastatin 80 mg once daily  - Continue cardiac rehab    A total of 40 minutes were spent on the day of the visit for chart review, care coordination, face-to-face consultation with the patient, and documentation.     Dandy Carvajal, MS    Cardiovascular Division  Pager [phone]    CC  Patient Care Team:  Piter Maynard PA-C as PCP - General (Physician Assistant)  Piter Maynard PA-C as Assigned PCP  Ramiro Costa MD as Assigned Heart and Vascular Provider

## 2021-12-03 ENCOUNTER — OFFICE VISIT (OUTPATIENT)
Dept: CARDIOLOGY | Facility: CLINIC | Age: 56
End: 2021-12-03
Attending: STUDENT IN AN ORGANIZED HEALTH CARE EDUCATION/TRAINING PROGRAM
Payer: COMMERCIAL

## 2021-12-03 VITALS
HEIGHT: 70 IN | OXYGEN SATURATION: 97 % | SYSTOLIC BLOOD PRESSURE: 110 MMHG | DIASTOLIC BLOOD PRESSURE: 75 MMHG | BODY MASS INDEX: 34.37 KG/M2 | HEART RATE: 70 BPM | WEIGHT: 240.1 LBS

## 2021-12-03 DIAGNOSIS — I25.10 CORONARY ARTERY CALCIFICATION SEEN ON CAT SCAN: ICD-10-CM

## 2021-12-03 DIAGNOSIS — Z98.61 PERCUTANEOUS TRANSLUMINAL CORONARY ANGIOPLASTY STATUS: ICD-10-CM

## 2021-12-03 DIAGNOSIS — I20.0 UNSTABLE ANGINA (H): ICD-10-CM

## 2021-12-03 DIAGNOSIS — E78.5 HYPERLIPIDEMIA LDL GOAL <160: Primary | ICD-10-CM

## 2021-12-03 PROCEDURE — 99215 OFFICE O/P EST HI 40 MIN: CPT | Performed by: STUDENT IN AN ORGANIZED HEALTH CARE EDUCATION/TRAINING PROGRAM

## 2021-12-03 PROCEDURE — G0463 HOSPITAL OUTPT CLINIC VISIT: HCPCS

## 2021-12-03 RX ORDER — ATORVASTATIN CALCIUM 80 MG/1
80 TABLET, FILM COATED ORAL DAILY
Qty: 90 TABLET | Refills: 3 | Status: SHIPPED | OUTPATIENT
Start: 2021-12-03 | End: 2022-06-27

## 2021-12-03 RX ORDER — PRASUGREL 10 MG/1
10 TABLET, FILM COATED ORAL DAILY
Qty: 30 TABLET | Refills: 11 | Status: SHIPPED | OUTPATIENT
Start: 2021-12-03 | End: 2022-06-27

## 2021-12-03 RX ORDER — METOPROLOL TARTRATE 25 MG/1
25 TABLET, FILM COATED ORAL 2 TIMES DAILY
Qty: 60 TABLET | Refills: 3 | Status: SHIPPED | OUTPATIENT
Start: 2021-12-03 | End: 2022-05-06

## 2021-12-03 ASSESSMENT — PAIN SCALES - GENERAL: PAINLEVEL: NO PAIN (0)

## 2021-12-03 ASSESSMENT — MIFFLIN-ST. JEOR: SCORE: 1929.72

## 2021-12-03 NOTE — PATIENT INSTRUCTIONS
You were seen at the [facility] clinic today.   You saw Dr. Ramiro Costa, [facility], [place].    The following is a summary of your office visit today:    1. Increase your metoprolol tartrate to 25 mg twice daily  2. Continue taking prasugrel, aspirin and atorvastatin  3. Please try and pursue moderate-intensity exercise for 30 minutes at least five times weekly.  4. Please try and maintain a diet rich in fruit and vegetables and low in saturated fats and sugars.   5. Follow up with me in three months       If you have questions after this visit:   Send a [facility] message or contact Abhishek Alexander LPN (Cardiology Nurse contact)  Clinic Office:  [phone] option #1, then #4 & ask for Abhishek (nurse line)   [facility]: [phone]  Fax:  [phone]   Appointments:  [phone] option #1, then option #1     HOW TO CHECK YOUR BLOOD PRESSURE AT HOME:    Avoid eating, smoking, and exercising for at least 30 minutes before taking a reading.    Be sure you have taken your BP medication at least 2-3 hours before you check it.     Sit quietly for 10 minutes before a reading.     Sit in a chair with your feet flat on the floor. Rest your  arm on a table so that the arm cuff is at the same level as your heart.    Remain still during the reading.    Record your blood pressure and pulse in a log and bring to your next appointment.     If you have had any blood work, imaging or other testing completed we will be in touch within 1-2 weeks regarding the results. If you have any questions, concerns or need to schedule a follow up, please contact us at the numbers above. If you are needing refills please contact your pharmacy. For urgent after-hours care please call the the [facility] at [phone] (option #4) and ask to speak to the on-call Cardiologist.    It was a pleasure meeting with you today. Please let us know if there is anything else we can do for you so  that we can be sure you are leaving completely satisfied with your care experience.     Your Cardiology Team at the [facility]

## 2021-12-03 NOTE — LETTER
12/3/2021      RE: Harjit Medina  [address] [facility] [id number]       Dear Colleague,    Thank you for the opportunity to participate in the care of your patient, Harjit Medina, at the [facility] HEART [facility] at [facility]. Please see a copy of my visit note below.        Chief Complaint: Coronary artery calcifications on CT     HPI: Harjit Medina is a 56 year old male with a past medical history of obstructive sleep apnea (on CPAP) who was referred to me for evaluation of incidentally discovered coronary artery calcifications on lung cancer screening CT by Mr. Piter Maynard.     Briefly, Mr. Medina had a lung cancer screening CT earlier this summer. This was unremarkable for high-risk lung lesions but he was found to have extensive coronary artery calcifications. In view of this, he was referred to Cardiology for further evaluation.    At the time of the consultation, Mr. Medina notes that he has had brief periods of chest tightness while sitting down.  The chest tightness lasts for less than a minute on each episode and he has about one episode per week.  He noticed the first episode about 6 months ago.  He has no relieving or exacerbating factors.  There is no radiation.  Mr. Medina notes an absence of chest pain with exertion, dyspnea at rest or with exertion, PND, orthopnea, peripheral edema, palpitations, lightheadedness or syncope.  Mr. Mercedes notes that he has 2 large [place] jeffery's which he takes walking at least 2 miles per day.  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  Mr. Medina has never had any exertional chest tightness during these activities.    A comprehensive ROS was done and the details are included above in the HPI.    Interval History (10/13/2021):  Since his last visit, Mr. Medina, underwent his coronary CTA.  The day before his course he  taken it, he had a very close friend from childhood passed away.  He was extremely distressed by this.  Shortly after undergoing his CTA, Mr. Medina described having constant, substernal chest pressure.  He was subsequent sent to the cardiac catheterization laboratory where he was noted to have three-vessel coronary disease on invasive coronary angiogram.  He underwent lithotripsy of his mid LAD lesion on the day as well as PCI to the left circumflex and OM 2.  I had followed up with him by phone on October 14, at which point he had noted that he had complete resolution of all of his chest pain type symptoms.  He was also noted to have severe disease of his right coronary artery but this was deferred for staged PCI.  While waiting for his PCI, he began to experience the symptoms again on November 5 (approximately 2 weeks later).  This prompted an ER visit, when he ended up getting his RCA PCI.  I again followed up with him by phone on November 8 and he mentioned that he had complete resolution of all his symptoms that as well.    Since then, Mr. Medina notes that he is progressing well with cardiac rehabis walking two miles daily with his two Mosotho shepherds. He has occasional chest fullness at night, but only when at rest while watching television. It resolves spontaneously after a few minutes and deep breathing helps to speed up the resolution. There is no exertional component. He had one episode of bleeding when he cut his finger recently, but has not had any sequelae of bleeding other than that.  He has not missed any doses of his aspirin or prasugrel.    A comprehensive ROS was done and the details are included above in the HPI.      Past Medical History:  - No prior history of hypertension, T2DM, dyslipidemia, CAD, TIA/stroke, vascular aneurysms, PAD  Past Medical History:   Diagnosis Date     NAYELY (generalized anxiety disorder)      Sleep apnea     cpap       Past Surgical History:    Past Surgical  History:   Procedure Laterality Date     COLONOSCOPY N/A 1/4/2018    Procedure: COMBINED COLONOSCOPY, SINGLE OR MULTIPLE BIOPSY/POLYPECTOMY BY BIOPSY;;  Surgeon: Leeroy aJcobsen MD;  Location: [facility] OR     COLONOSCOPY WITH CO2 INSUFFLATION N/A 1/4/2018    Procedure: COLONOSCOPY WITH CO2 INSUFFLATION;  COLON-SCREENING / LIMON ;  Surgeon: Leeroy Jacobsen MD;  Location: [facility] OR     CV CORONARY ANGIOGRAM  11/5/2021    Procedure: CV CORONARY ANGIOGRAM;  Surgeon: Dalton Oliva MD;  Location:  HEART CARDIAC CATH LAB     CV CORONARY LITHOTRIPSY PCI N/A 10/13/2021    Procedure: CV Coronary Lithotripsy PCI;  Surgeon: Miki Zaragoza MD;  Location:  HEART CARDIAC CATH LAB     CV HEART CATHETERIZATION WITH POSSIBLE INTERVENTION N/A 10/13/2021    Procedure: Heart Catheterization with Possible Intervention;  Surgeon: Miki Zaragoza MD;  Location:  HEART CARDIAC CATH LAB     CV INTRAVASULAR ULTRASOUND N/A 11/5/2021    Procedure: Intravascular Ultrasound;  Surgeon: Dalton Oliva MD;  Location:  HEART CARDIAC CATH LAB     CV PCI ANGIOPLASTY N/A 11/5/2021    Procedure: Percutaneous Transluminal Angioplasty;  Surgeon: Dalton Oliva MD;  Location:  HEART CARDIAC CATH LAB     CV PCI STENT DRUG ELUTING N/A 10/13/2021    Procedure: Percutaneous Coronary Intervention Stent Drug Eluting;  Surgeon: Miki Zaragoza MD;  Location:  HEART CARDIAC CATH LAB       Drug History:  Home cardiac meds: Aspirin 81 mg once daily, atorvastatin 80 mg once daily, metoprolol tartrate 12.5 mg twice daily, prasugrel 10 mg once daily.  Current Outpatient Medications   Medication Sig Dispense Refill     aspirin (ASA) 81 MG EC tablet Take 1 tablet (81 mg) by mouth daily 90 tablet 3     atorvastatin (LIPITOR) 80 MG tablet Take 1 tablet (80 mg) by mouth daily 90 tablet 3     metoprolol tartrate (LOPRESSOR) 25 MG tablet Take 0.5 tablets (12.5 mg) by mouth 2 times daily 60  "tablet 3     order for DME Equipment being ordered: CPAP and all necessary supplies 1 Device 0     prasugrel (EFFIENT) 10 MG TABS tablet Take 1 tablet (10 mg) by mouth daily 30 tablet 11     escitalopram (LEXAPRO) 10 MG tablet Take 1/2 tab daily for 1 week, then increase to 1 full tab daily thereafter. (Patient taking differently: Take 10 mg by mouth daily Take 1/2 tab daily for 1 week, then increase to 1 full tab daily thereafter.) 30 tablet 1     nitroGLYcerin (NITROSTAT) 0.4 MG sublingual tablet For chest pain place 1 tablet under the tongue every 5 minutes for 3 doses. If symptoms persist 5 minutes after 1st dose call 911. 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   Social History     Tobacco Use     Smoking status: Current Every Day Smoker     Packs/day: 1.00     Types: Cigarettes     Smokeless tobacco: Current User     Last attempt to quit: 9/1/2018   Substance Use Topics     Alcohol use: Yes     Comment: occ -        No Known Allergies      Physical Examination:  Vitals: /75 (BP Location: Right arm, Patient Position: Chair, Cuff Size: Adult Large)   Pulse 70   Ht 1.785 m (5' 10.28\")   Wt 108.9 kg (240 lb 1.6 oz)   SpO2 97%   BMI 34.18 kg/m    BMI= Body mass index is 34.18 kg/m .    GENERAL: Healthy, alert and no distress  Eyes: No xanthelasmas.  NECK: No palpable neck masses/goitre and no evidence of retrosternal goitre.   ENT: Moist mucosal membranes.  RESPIRATORY: No signs of resp distress. Lungs CTAB.  CARDIOVASCULAR:  No JVD, regular, normal S1+S2 without added sounds or murmurs.  ABDOMEN: ND, soft, non-tender, normal bowel sounds.  EXTREMITIES: Warm, well-perfused, no edema.   NEUROLOGY: GCS 15/15, no " focal deficits.  VASC: No carotid bruits. Carotid, radial, brachial, popliteal, dorsalis pedis and posterior tibialis pulses are normal in volumes and symmetric bilaterally.   SKIN: No ecchymoses, no rashes.  PSYCH: Cooperative, pleasant affect.       Investigations:  I personally viewed and interpreted the following investigations:    Labs:    CBC RESULTS:  Lab Results   Component Value Date    WBC 11.9 (H) 11/06/2021    WBC 15.0 (H) 09/27/2016    RBC 4.72 11/06/2021    RBC 4.81 09/27/2016    HGB 15.1 11/06/2021    HGB 15.8 09/27/2016    HCT 43.6 11/06/2021    HCT 46.1 09/27/2016    MCV 92 11/06/2021    MCV 96 09/27/2016    MCH 32.0 11/06/2021    MCH 32.8 09/27/2016    MCHC 34.6 11/06/2021    MCHC 34.3 09/27/2016    RDW 12.7 11/06/2021    RDW 12.8 09/27/2016     11/06/2021     09/27/2016       CMP RESULTS:  Lab Results   Component Value Date     11/06/2021     05/01/2019    POTASSIUM 3.8 11/06/2021    POTASSIUM 4.7 05/01/2019    CHLORIDE 103 11/06/2021    CHLORIDE 103 05/01/2019    CO2 25 11/06/2021    CO2 25 05/01/2019    ANIONGAP 6 11/06/2021    ANIONGAP 9 05/01/2019     (H) 11/06/2021    GLC 88 05/01/2019    BUN 12 11/06/2021    BUN 14 05/01/2019    CR 0.80 11/06/2021    CR 0.91 05/01/2019    GFRESTIMATED >90 11/06/2021    GFRESTIMATED >60 10/13/2021    GFRESTIMATED >90 05/01/2019    GFRESTBLACK >90 05/01/2019    NIYA 8.9 11/06/2021    NIYA 9.2 05/01/2019    BILITOTAL 1.3 11/05/2021    ALBUMIN 4.1 11/05/2021    ALKPHOS 124 11/05/2021    ALT 62 11/05/2021    AST 24 11/05/2021          LIPIDS:  Lab Results   Component Value Date    CHOL 214 05/01/2019     Lab Results   Component Value Date    HDL 44 05/01/2019     Lab Results   Component Value Date     05/01/2019     Lab Results   Component Value Date    TRIG 254 05/01/2019     Lab Results   Component Value Date    CHOLHDLRATIO 5.4 11/05/2015       Recent Tests:    Electrocardiogram (09/01/2021):  Normal sinus rhythm, normal  axis, normal intervals, poor R wave progression.      CT Lung Cancer Screening 07/28/2021:                                                                   IMPRESSION:   1. ACR Assessment Category:  Lung-RADS Category 2. Benign appearance or behavior. Recommendation: Continue annual screening, if clinically relevant (please order exam code IMG 2290). .   2. Significant Incidental Finding(s):  Category S: Yes.  *  Extensive atherosclerotic vascular calcification of the coronary arteries and thoracic aorta.    *  Hepatic steatosis.  *  Cholelithiasis without CT evidence of acute cholecystitis.    Coronary Angiogram 10/13/2021:  1.  Severe three vessel CAD.  2. Lithotripsy of the mid LAD lesion.  3. PCI with four drug eluting stents [two to the LAD (2.75x38 and 3.5x12) and two to the LCx (proximal 3.0 x 16 and mid-distal with 2.5 x 48 mm) and OM2).      Coronary Angiogram 11/05/2021:   1.Successful Balloon angioplasty to the under expanded stent in the LAD as seen on IVUS.  2. Successful PCI of the distal to proximal RCA with 4 stents (2.5 x 16 mm stent in dRCA, 2.5 x 16 and 2.5 x 24 mm stents in mRCA, and 3.0 x 32 mm stents in proximal RCA). There was residual disease that needs to be treated medically.    Assessment and Plan:   Harjit Medina is a 56 year old male with a past medical history of smoking and incidentally discovered coronary calcification on a lung cancer screening CT was initially referred to me in September 2021 for evaluation of the coronary calcifications.    Mr. Meidna has had a excellent symptomatic response to his multivessel PCI.  However, he still has infrequent episodes of chest pain that are atypical in that they are completely unrelated to exertion. While I am not sure that they are anginal in nature, he can certainly tolerate metoprolol tartrate at a slightly higher dose as a therapeutic trial. This would be helpful to do while he is continuing cardiac rehab.      Problems:  1. Coronary artery disease, status post PCI of mLAD, LCx, and OM2 (10/13/2021) and RCA (11/05/2021)   2. Current smoking  3. Obesity    Plan:  - Increase metoprolol tartrate to 25 mg twice daily  - Continue taking prasugrel 10 mg daily till and including November 5, 2022  - Continue taking aspirin 81 mg lifelong and atorvastatin 80 mg once daily  - Continue cardiac rehab    A total of 40 minutes were spent on the day of the visit for chart review, care coordination, face-to-face consultation with the patient, and documentation.     Ramiro Costa [facility], [place]    Cardiovascular Division  Pager [phone]    CC  Patient Care Team:  Piter Maynard PA-C as PCP - General (Physician Assistant)

## 2021-12-06 ENCOUNTER — HOSPITAL ENCOUNTER (OUTPATIENT)
Dept: CARDIAC REHAB | Facility: CLINIC | Age: 56
End: 2021-12-06
Attending: NURSE PRACTITIONER
Payer: COMMERCIAL

## 2021-12-06 PROCEDURE — 93798 PHYS/QHP OP CAR RHAB W/ECG: CPT

## 2021-12-08 ENCOUNTER — HOSPITAL ENCOUNTER (OUTPATIENT)
Dept: CARDIAC REHAB | Facility: CLINIC | Age: 56
End: 2021-12-08
Attending: NURSE PRACTITIONER
Payer: COMMERCIAL

## 2021-12-08 PROCEDURE — 93798 PHYS/QHP OP CAR RHAB W/ECG: CPT

## 2021-12-13 ENCOUNTER — HOSPITAL ENCOUNTER (OUTPATIENT)
Dept: CARDIAC REHAB | Facility: CLINIC | Age: 56
End: 2021-12-13
Attending: NURSE PRACTITIONER
Payer: COMMERCIAL

## 2021-12-13 PROCEDURE — 93798 PHYS/QHP OP CAR RHAB W/ECG: CPT

## 2021-12-15 ENCOUNTER — HOSPITAL ENCOUNTER (OUTPATIENT)
Dept: CARDIAC REHAB | Facility: CLINIC | Age: 56
End: 2021-12-15
Attending: NURSE PRACTITIONER
Payer: COMMERCIAL

## 2021-12-15 PROCEDURE — 93798 PHYS/QHP OP CAR RHAB W/ECG: CPT

## 2021-12-22 ENCOUNTER — HOSPITAL ENCOUNTER (OUTPATIENT)
Dept: CARDIAC REHAB | Facility: CLINIC | Age: 56
End: 2021-12-22
Attending: NURSE PRACTITIONER
Payer: COMMERCIAL

## 2021-12-22 PROCEDURE — 93798 PHYS/QHP OP CAR RHAB W/ECG: CPT

## 2021-12-29 ENCOUNTER — HOSPITAL ENCOUNTER (OUTPATIENT)
Dept: CARDIAC REHAB | Facility: CLINIC | Age: 56
End: 2021-12-29
Attending: NURSE PRACTITIONER
Payer: COMMERCIAL

## 2021-12-29 PROCEDURE — 93798 PHYS/QHP OP CAR RHAB W/ECG: CPT

## 2022-01-10 ENCOUNTER — HOSPITAL ENCOUNTER (OUTPATIENT)
Dept: CARDIAC REHAB | Facility: CLINIC | Age: 57
End: 2022-01-10
Attending: NURSE PRACTITIONER
Payer: COMMERCIAL

## 2022-01-10 PROCEDURE — 93798 PHYS/QHP OP CAR RHAB W/ECG: CPT

## 2022-01-12 ENCOUNTER — HOSPITAL ENCOUNTER (OUTPATIENT)
Dept: CARDIAC REHAB | Facility: CLINIC | Age: 57
End: 2022-01-12
Attending: NURSE PRACTITIONER
Payer: COMMERCIAL

## 2022-01-12 PROCEDURE — 93798 PHYS/QHP OP CAR RHAB W/ECG: CPT

## 2022-01-17 ENCOUNTER — HOSPITAL ENCOUNTER (OUTPATIENT)
Dept: CARDIAC REHAB | Facility: CLINIC | Age: 57
End: 2022-01-17
Attending: NURSE PRACTITIONER
Payer: COMMERCIAL

## 2022-01-17 PROCEDURE — 93798 PHYS/QHP OP CAR RHAB W/ECG: CPT

## 2022-01-19 ENCOUNTER — HOSPITAL ENCOUNTER (OUTPATIENT)
Dept: CARDIAC REHAB | Facility: CLINIC | Age: 57
End: 2022-01-19
Attending: NURSE PRACTITIONER
Payer: COMMERCIAL

## 2022-01-19 PROCEDURE — 93798 PHYS/QHP OP CAR RHAB W/ECG: CPT

## 2022-01-24 ENCOUNTER — HOSPITAL ENCOUNTER (OUTPATIENT)
Dept: CARDIAC REHAB | Facility: CLINIC | Age: 57
End: 2022-01-24
Attending: NURSE PRACTITIONER
Payer: COMMERCIAL

## 2022-01-24 PROCEDURE — 93798 PHYS/QHP OP CAR RHAB W/ECG: CPT

## 2022-01-26 ENCOUNTER — HOSPITAL ENCOUNTER (OUTPATIENT)
Dept: CARDIAC REHAB | Facility: CLINIC | Age: 57
End: 2022-01-26
Attending: NURSE PRACTITIONER
Payer: COMMERCIAL

## 2022-01-26 PROCEDURE — 93798 PHYS/QHP OP CAR RHAB W/ECG: CPT

## 2022-02-02 ENCOUNTER — HOSPITAL ENCOUNTER (OUTPATIENT)
Dept: CARDIAC REHAB | Facility: CLINIC | Age: 57
End: 2022-02-02
Attending: NURSE PRACTITIONER
Payer: COMMERCIAL

## 2022-02-02 PROCEDURE — 93798 PHYS/QHP OP CAR RHAB W/ECG: CPT

## 2022-02-04 NOTE — TELEPHONE ENCOUNTER
Patient is having problems with his diverticulitis again. Cramping and irregular bowel movements. Can he get some meds without being seen? Ok to leave a message.     headache

## 2022-02-07 ENCOUNTER — HOSPITAL ENCOUNTER (OUTPATIENT)
Dept: CARDIAC REHAB | Facility: CLINIC | Age: 57
End: 2022-02-07
Attending: NURSE PRACTITIONER
Payer: COMMERCIAL

## 2022-02-07 PROCEDURE — 93798 PHYS/QHP OP CAR RHAB W/ECG: CPT

## 2022-02-09 ENCOUNTER — HOSPITAL ENCOUNTER (OUTPATIENT)
Dept: CARDIAC REHAB | Facility: CLINIC | Age: 57
End: 2022-02-09
Attending: NURSE PRACTITIONER
Payer: COMMERCIAL

## 2022-02-09 PROCEDURE — 93798 PHYS/QHP OP CAR RHAB W/ECG: CPT

## 2022-02-16 ENCOUNTER — HOSPITAL ENCOUNTER (OUTPATIENT)
Dept: CARDIAC REHAB | Facility: CLINIC | Age: 57
End: 2022-02-16
Attending: NURSE PRACTITIONER
Payer: COMMERCIAL

## 2022-02-16 PROCEDURE — 93797 PHYS/QHP OP CAR RHAB WO ECG: CPT | Mod: XU

## 2022-02-16 PROCEDURE — 93798 PHYS/QHP OP CAR RHAB W/ECG: CPT

## 2022-02-28 NOTE — PROGRESS NOTES
Chief Complaint: Coronary artery calcifications on CT     HPI: Harjit Medina is a 56 year old male with a past medical history of obstructive sleep apnea (on CPAP) who was referred to me for evaluation of incidentally discovered coronary artery calcifications on lung cancer screening CT by Mr. Piter Maynard.     Briefly, Mr. Medina had a lung cancer screening CT earlier this summer. This was unremarkable for high-risk lung lesions but he was found to have extensive coronary artery calcifications. In view of this, he was referred to Cardiology for further evaluation.    At the time of the consultation, Mr. Medina notes that he has had brief periods of chest tightness while sitting down.  The chest tightness lasts for less than a minute on each episode and he has about one episode per week.  He noticed the first episode about 6 months ago.  He has no relieving or exacerbating factors.  There is no radiation.  Mr. Medina notes an absence of chest pain with exertion, dyspnea at rest or with exertion, PND, orthopnea, peripheral edema, palpitations, lightheadedness or syncope.  Mr. Mercedes notes that he has 2 large Bulgarian jeffery's which he takes walking at least 2 miles per day.  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  Mr. Medina has never had any exertional chest tightness during these activities.    A comprehensive ROS was done and the details are included above in the HPI.    Interval History (10/13/2021):  Since his last visit, Mr. Medina, underwent his coronary CTA.  The day before his course he taken it, he had a very close friend from childhood passed away.  He was extremely distressed by this.  Shortly after undergoing his CTA, Mr. Medina described having constant, substernal chest pressure.  He was subsequent sent to the cardiac catheterization laboratory where he was noted to have three-vessel coronary disease on invasive coronary  angiogram.  He underwent lithotripsy of his mid LAD lesion on the day as well as PCI to the left circumflex and OM 2.  I had followed up with him by phone on October 14, at which point he had noted that he had complete resolution of all of his chest pain type symptoms.  He was also noted to have severe disease of his right coronary artery but this was deferred for staged PCI.  While waiting for his PCI, he began to experience the symptoms again on November 5 (approximately 2 weeks later).  This prompted an ER visit, when he ended up getting his RCA PCI.  I again followed up with him by phone on November 8 and he mentioned that he had complete resolution of all his symptoms that as well.    Since then, Mr. Medina notes that he is progressing well with cardiac rehabis walking two miles daily with his two [place] shepherds. He has occasional chest fullness at night, but only when at rest while watching television. It resolves spontaneously after a few minutes and deep breathing helps to speed up the resolution. There is no exertional component. He had one episode of bleeding when he cut his finger recently, but has not had any sequelae of bleeding other than that.  He has not missed any doses of his aspirin or prasugrel.    A comprehensive ROS was done and the details are included above in the HPI.    Interval History 03/02/2022:  Since his last visit, Mr. Medina notes that he has been completely devoid of angina.  He continues to adhere to a diet low in saturated fats and sugars and has dramatically increase the intake of food vegetables.  Mr. Medina also continues to walk at least 2 miles daily with his wife.  He is taking aspirin prasugrel as instructed without any missed doses.  He has not had any sequelae of bleeding.    Mr. Medina's only complaint was some chest burning that occurred when he reclined and has chair.  He does this soon after eating and has a burning sensation when he reclines.   The burning sensation  subsides immediately when he sits up.    A comprehensive ROS was done and the details are included above in the HPI.      Past Medical History:  - Coronary artery disease status post PCI of mLAD, LCx, and OM2 (10/13/2021) and RCA (11/05/2021)   - Smoking  - BARRINGTON on CPAP   - No prior history of hypertension, T2DM, dyslipidemia, TIA/stroke, vascular aneurysms, PAD  Past Medical History:   Diagnosis Date     NAYELY (generalized anxiety disorder)      Sleep apnea     cpap       Past Surgical History:    Past Surgical History:   Procedure Laterality Date     COLONOSCOPY N/A 1/4/2018    Procedure: COMBINED COLONOSCOPY, SINGLE OR MULTIPLE BIOPSY/POLYPECTOMY BY BIOPSY;;  Surgeon: Leeroy Jacobsen MD;  Location: [facility] OR     COLONOSCOPY WITH CO2 INSUFFLATION N/A 1/4/2018    Procedure: COLONOSCOPY WITH CO2 INSUFFLATION;  COLON-SCREENING / LIMON ;  Surgeon: Leeroy Jacobsen MD;  Location: [facility] OR     CV CORONARY ANGIOGRAM  11/5/2021    Procedure: CV CORONARY ANGIOGRAM;  Surgeon: Dalton Oliva MD;  Location:  [facility]     CV CORONARY LITHOTRIPSY PCI N/A 10/13/2021    Procedure: CV Coronary Lithotripsy PCI;  Surgeon: Miki Zaragoza MD;  Location:  [facility] CATH [facility]     CV HEART CATHETERIZATION WITH POSSIBLE INTERVENTION N/A 10/13/2021    Procedure: Heart Catheterization with Possible Intervention;  Surgeon: Miki Zaragoza MD;  Location:  [facility] CATH LAB     CV INTRAVASULAR ULTRASOUND N/A 11/5/2021    Procedure: Intravascular Ultrasound;  Surgeon: Dalton Oliva MD;  Location:  HE[facility] CARD[facility] CATH LAB     CV PCI ANGIOPLASTY N/A 11/5/2021    Procedure: Percutaneous Transluminal Angioplasty;  Surgeon: Dalton Oliva MD;  Location:  HE[facility] C[facility] CATH LAB     CV PCI STENT DRUG ELUTING N/A 10/13/2021    Procedure: Percutaneous Coronary Intervention Stent Drug Eluting;  Surgeon: Miki Zaragoza MD;   "Location:  HEART CARDIAC CATH LAB       Drug History:  Home cardiac meds: Aspirin 81 mg once daily (high confirmed that he is taking this daily on 03/02/2022), atorvastatin 80 mg once daily, metoprolol tartrate 25 mg twice daily, prasugrel 10 mg once daily.  Current Outpatient Medications   Medication Sig Dispense Refill     atorvastatin (LIPITOR) 80 MG tablet Take 1 tablet (80 mg) by mouth daily 90 tablet 3     metoprolol tartrate (LOPRESSOR) 25 MG tablet Take 1 tablet (25 mg) by mouth 2 times daily 60 tablet 3     nitroGLYcerin (NITROSTAT) 0.4 MG sublingual tablet For chest pain place 1 tablet under the tongue every 5 minutes for 3 doses. If symptoms persist 5 minutes after 1st dose call 911. 25 tablet 11     prasugrel (EFFIENT) 10 MG TABS tablet Take 1 tablet (10 mg) by mouth daily 30 tablet 11     aspirin (ASA) 81 MG EC tablet Take 1 tablet (81 mg) by mouth daily (Patient not taking: Reported on 3/2/2022) 90 tablet 3     escitalopram (LEXAPRO) 10 MG tablet Take 1/2 tab daily for 1 week, then increase to 1 full tab daily thereafter. 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   Social History     Tobacco Use     Smoking status: Former Smoker     Packs/day: 1.00     Types: Cigarettes     Smokeless tobacco: Current User     Last attempt to quit: 9/1/2018   Substance Use Topics     Alcohol use: Yes     Comment: occ -        No Known Allergies      Physical Examination:  Vitals: /79 (BP Location: Left arm, Patient Position: Sitting, Cuff Size: Adult Regular)   Pulse 70   Ht 1.765 m (5' 9.5\")   " Wt 110.7 kg (244 lb)   SpO2 98%   BMI 35.52 kg/m    BMI= Body mass index is 35.52 kg/m .    GENERAL: Healthy, alert and no distress  Eyes: No xanthelasmas.  NECK: No palpable neck masses.  ENT: Moist mucosal membranes.  RESPIRATORY: No signs of resp distress.  CARDIOVASCULAR:  No JVD.  EXTREMITIES: Warm, well-perfused, no edema.   NEUROLOGY: GCS 15/15, no focal deficits.  SKIN: No ecchymoses, no rashes.  PSYCH: Cooperative, pleasant affect.       Investigations:  I personally viewed and interpreted the following investigations:    Labs:    CBC RESULTS:  Lab Results   Component Value Date    WBC 11.9 (H) 11/06/2021    WBC 15.0 (H) 09/27/2016    RBC 4.72 11/06/2021    RBC 4.81 09/27/2016    HGB 15.1 11/06/2021    HGB 15.8 09/27/2016    HCT 43.6 11/06/2021    HCT 46.1 09/27/2016    MCV 92 11/06/2021    MCV 96 09/27/2016    MCH 32.0 11/06/2021    MCH 32.8 09/27/2016    MCHC 34.6 11/06/2021    MCHC 34.3 09/27/2016    RDW 12.7 11/06/2021    RDW 12.8 09/27/2016     11/06/2021     09/27/2016       CMP RESULTS:  Lab Results   Component Value Date     11/06/2021     05/01/2019    POTASSIUM 3.8 11/06/2021    POTASSIUM 4.7 05/01/2019    CHLORIDE 103 11/06/2021    CHLORIDE 103 05/01/2019    CO2 25 11/06/2021    CO2 25 05/01/2019    ANIONGAP 6 11/06/2021    ANIONGAP 9 05/01/2019     (H) 11/06/2021    GLC 88 05/01/2019    BUN 12 11/06/2021    BUN 14 05/01/2019    CR 0.80 11/06/2021    CR 0.91 05/01/2019    GFRESTIMATED >90 11/06/2021    GFRESTIMATED >60 10/13/2021    GFRESTIMATED >90 05/01/2019    GFRESTBLACK >90 05/01/2019    NIYA 8.9 11/06/2021    NIYA 9.2 05/01/2019    BILITOTAL 1.3 11/05/2021    ALBUMIN 4.1 11/05/2021    ALKPHOS 124 11/05/2021    ALT 62 11/05/2021    AST 24 11/05/2021          LIPIDS:  Lab Results   Component Value Date    CHOL 214 05/01/2019     Lab Results   Component Value Date    HDL 44 05/01/2019     Lab Results   Component Value Date     05/01/2019     Lab Results    Component Value Date    TRIG 254 05/01/2019     Lab Results   Component Value Date    CHOLHDLRATIO 5.4 11/05/2015       Recent Tests:    Electrocardiogram (09/01/2021):  Normal sinus rhythm, normal axis, normal intervals, poor R wave progression.      CT Lung Cancer Screening 07/28/2021:                                                                   IMPRESSION:   1. ACR Assessment Category:  Lung-RADS Category 2. Benign appearance or behavior. Recommendation: Continue annual screening, if clinically relevant (please order exam code IMG 2290). .   2. Significant Incidental Finding(s):  Category S: Yes.  *  Extensive atherosclerotic vascular calcification of the coronary arteries and thoracic aorta.    *  Hepatic steatosis.  *  Cholelithiasis without CT evidence of acute cholecystitis.    Coronary Angiogram 10/13/2021:  1.  Severe three vessel CAD.  2. Lithotripsy of the mid LAD lesion.  3. PCI with four drug eluting stents [two to the LAD (2.75x38 and 3.5x12) and two to the LCx (proximal 3.0 x 16 and mid-distal with 2.5 x 48 mm) and OM2).      Coronary Angiogram 11/05/2021:   1.Successful Balloon angioplasty to the under expanded stent in the LAD as seen on IVUS.  2. Successful PCI of the distal to proximal RCA with 4 stents (2.5 x 16 mm stent in dRCA, 2.5 x 16 and 2.5 x 24 mm stents in mRCA, and 3.0 x 32 mm stents in proximal RCA). There was residual disease that needs to be treated medically.    Assessment and Plan:   Harjit Medina is a 56 year old male with a past medical history of smoking and incidentally discovered coronary calcification on a lung cancer screening CT was initially referred to me in September 2021 for evaluation of the coronary calcifications.    Mr. Medina continues to do extremely well after his multivessel PCI with a CCS score of 0.  Importantly, he is strictly adherent to his lifestyle strategies.  I do not wish to make any changes to his medical regimen today and  congratulated him on his excellent progress thus far.    Problems:  1. Coronary artery disease, status post PCI of mLAD, LCx, and OM2 (10/13/2021) and RCA (11/05/2021)   2. Current smoking  3. Obesity  4. BARRINGTON on CPAP     Plan:  -Continue metoprolol tartrate 25 mg twice daily  - Continue taking prasugrel 10 mg daily till and including November 5, 2022  - Continue taking aspirin 81 mg lifelong and atorvastatin 80 mg once daily    A total of 40 minutes were spent on the day of the visit for chart review, care coordination, face-to-face consultation with the patient, and documentation.     [facility]amiro Costa [facility] [place] [place]    Cardiovascular Division  Pager [phone]    CC  Patient Care Team:  Piter Maynard PA-C as PCP - General (Physician Assistant)  Piter Maynard PA-C as Assigned PCP  Ramiro Costa MD as Assigned Heart and Vascular Provider

## 2022-03-02 ENCOUNTER — OFFICE VISIT (OUTPATIENT)
Dept: CARDIOLOGY | Facility: CLINIC | Age: 57
End: 2022-03-02
Payer: COMMERCIAL

## 2022-03-02 VITALS
HEART RATE: 70 BPM | WEIGHT: 244 LBS | SYSTOLIC BLOOD PRESSURE: 121 MMHG | HEIGHT: 70 IN | OXYGEN SATURATION: 98 % | BODY MASS INDEX: 34.93 KG/M2 | DIASTOLIC BLOOD PRESSURE: 79 MMHG

## 2022-03-02 DIAGNOSIS — I25.10 CORONARY ARTERY CALCIFICATION SEEN ON CAT SCAN: ICD-10-CM

## 2022-03-02 DIAGNOSIS — E78.5 HYPERLIPIDEMIA LDL GOAL <160: ICD-10-CM

## 2022-03-02 DIAGNOSIS — Z98.61 PERCUTANEOUS TRANSLUMINAL CORONARY ANGIOPLASTY STATUS: ICD-10-CM

## 2022-03-02 DIAGNOSIS — I20.0 UNSTABLE ANGINA (H): ICD-10-CM

## 2022-03-02 PROCEDURE — 99215 OFFICE O/P EST HI 40 MIN: CPT | Performed by: STUDENT IN AN ORGANIZED HEALTH CARE EDUCATION/TRAINING PROGRAM

## 2022-03-02 NOTE — PATIENT INSTRUCTIONS
You were seen at the [facility] Cardiology clinic today.   You saw Dr. Ramiro Costa, [facility], [place].    The following is a summary of your office visit today:    1. Keep medical regimen the same.   2. Keep exercise and diet regimen the same.  3. Follow-up with me in 6 months.    Nurse contact information:    Cardiology Care Coordinators: Luann Hester RN and Eleonora Busch RN      Phone: [phone]   Fax: [phone]      HOW TO CHECK YOUR BLOOD PRESSURE AT HOME:     Avoid eating, smoking, and exercising for at least 30 minutes before taking a reading.    Be sure you have taken your BP medication at least 2-3 hours before you check it.     Sit quietly for 10 minutes before a reading.     Sit in a chair with your feet flat on the floor. Rest your  arm on a table so that the arm cuff is at the same level as your heart.    Remain still during the reading.    Record your blood pressure and pulse in a log and bring to your next appointment.     If you have had any blood work, imaging or other testing completed we will be in touch within 1-2 weeks regarding the results. If you have any questions, concerns or need to schedule a follow up, please contact us at [phone]. If you are needing refills please contact your pharmacy. For urgent after hour care please call the [place] [facility] at [phone] or the [facility] at [phone] and ask to speak to the on-call Cardiologist.    It was a pleasure meeting with you today. Please let us know if there is anything else we can do for you so that we can be sure you are leaving completely satisfied with your care experience.     Your Cardiology Team at [facility]  RN Care Coordinators: Luann ALFONSO: Jd and Poppy

## 2022-03-02 NOTE — PROGRESS NOTES
"Harjit Medina's goals for this visit include: Just checking in. Hoping to be on the right track.     He requests these members of his care team be copied on today's visit information: PCP    PCP: Piter Maynard    Referring Provider:  Ramiro Costa MD  [address] [place] [address] [address]  [place] [address]    /79 (BP Location: Left arm, Patient Position: Sitting, Cuff Size: Adult Regular)   Pulse 70   Ht 1.765 m (5' 9.5\")   Wt 110.7 kg (244 lb)   SpO2 98%   BMI 35.52 kg/m      Do you need any medication refills at today's visit? No.    Jd Mackay, EMT  Clinic Support  [facility]    [phone]    Employed by [facility] [employer]        "

## 2022-04-09 ENCOUNTER — HEALTH MAINTENANCE LETTER (OUTPATIENT)
Age: 57
End: 2022-04-09

## 2022-05-06 DIAGNOSIS — I25.10 CORONARY ARTERY CALCIFICATION SEEN ON CAT SCAN: ICD-10-CM

## 2022-05-06 DIAGNOSIS — Z98.61 PERCUTANEOUS TRANSLUMINAL CORONARY ANGIOPLASTY STATUS: ICD-10-CM

## 2022-05-06 DIAGNOSIS — I20.0 UNSTABLE ANGINA (H): ICD-10-CM

## 2022-05-06 RX ORDER — METOPROLOL TARTRATE 25 MG/1
25 TABLET, FILM COATED ORAL 2 TIMES DAILY
Qty: 180 TABLET | Refills: 3 | Status: SHIPPED | OUTPATIENT
Start: 2022-05-06 | End: 2022-06-27

## 2022-05-06 NOTE — TELEPHONE ENCOUNTER
[facility] Call Center    Phone Message    May a detailed message be left on voicemail: yes     Reason for Call: Medication Refill Request    Has the patient contacted the pharmacy for the refill? Yes   Name of medication being requested: metoprolol tartrate (LOPRESSOR) 25 MG tablet   Provider who prescribed the medication: Julissa  Pharmacy:   [id number] - [place], [place] - [address]  Date medication is needed: asap           Action Taken: Message routed to:  [facility] ([facility] cardio    Travel Screening: Not Applicable

## 2022-05-06 NOTE — TELEPHONE ENCOUNTER
Last Clinic Visit: 3/2/2022  [facility] 9/25/22  Call to Harjit advised prescription sent to requested pharmacy

## 2022-06-22 DIAGNOSIS — I20.0 UNSTABLE ANGINA (H): ICD-10-CM

## 2022-06-22 DIAGNOSIS — I25.10 CORONARY ARTERY CALCIFICATION SEEN ON CAT SCAN: Primary | ICD-10-CM

## 2022-06-22 DIAGNOSIS — Z98.61 PERCUTANEOUS TRANSLUMINAL CORONARY ANGIOPLASTY STATUS: ICD-10-CM

## 2022-06-22 NOTE — TELEPHONE ENCOUNTER
Pending Prescriptions:                       Disp   Refills    aspirin (ASA) 81 MG EC tablet             90 tab*3            Sig: Take 1 tablet (81 mg) by mouth daily    prasugrel (EFFIENT) 10 MG TABS tablet     30 tab*11           Sig: Take 1 tablet (10 mg) by mouth daily    atorvastatin (LIPITOR) 80 MG tablet       90 tab*3            Sig: Take 1 tablet (80 mg) by mouth daily    metoprolol tartrate (LOPRESSOR) 25 MG tab*180 ta*3            Sig: Take 1 tablet (25 mg) by mouth 2 times daily      Patient is requesting 90 days supply thru mail order.     SUZY Le   Email: [email or web address]  [facility] - Rheumatology  Phone: [phone]  Fax: [phone]

## 2022-06-27 RX ORDER — METOPROLOL TARTRATE 25 MG/1
25 TABLET, FILM COATED ORAL 2 TIMES DAILY
Qty: 180 TABLET | Refills: 2 | Status: SHIPPED | OUTPATIENT
Start: 2022-06-27 | End: 2022-10-06

## 2022-06-27 RX ORDER — ATORVASTATIN CALCIUM 80 MG/1
80 TABLET, FILM COATED ORAL DAILY
Qty: 90 TABLET | Refills: 2 | Status: SHIPPED | OUTPATIENT
Start: 2022-06-27 | End: 2022-10-06

## 2022-06-27 RX ORDER — PRASUGREL 10 MG/1
10 TABLET, FILM COATED ORAL DAILY
Qty: 30 TABLET | Refills: 5 | Status: SHIPPED | OUTPATIENT
Start: 2022-06-27 | End: 2022-10-06

## 2022-07-30 ENCOUNTER — HEALTH MAINTENANCE LETTER (OUTPATIENT)
Age: 57
End: 2022-07-30

## 2022-09-30 NOTE — PROGRESS NOTES
SUBJECTIVE:   CC: Harjit is an 57 year old who presents for preventative health visit.       Patient has been advised of split billing requirements and indicates understanding: Yes  Healthy Habits:     Getting at least 3 servings of Calcium per day:  Yes    Bi-annual eye exam:  Yes    Dental care twice a year:  Yes    Sleep apnea or symptoms of sleep apnea:  Sleep apnea    Diet:  Low fat/cholesterol    Frequency of exercise:  4-5 days/week    Duration of exercise:  45-60 minutes    Taking medications regularly:  Yes    Medication side effects:  None    PHQ-2 Total Score: 0    Additional concerns today:  No    Recheck of cad. 7 stents placed a year ago. No sob/palps/dizziness/ha's. Occasional chest pain in the evening when watching tv. No exertional chest pain .   Recheck of obesity. Discussed weight loss options. This will help lower his cholesterol and maintain his blood pressure.  Recheck isaias. Doing well with out meds   Today's PHQ-2 Score:   PHQ-2 ( 1999 [employer]) 10/6/2022   Q1: Little interest or pleasure in doing things 0   Q2: Feeling down, depressed or hopeless 0   PHQ-2 Score 0   PHQ-2 Total Score (12-17 Years)- Positive if 3 or more points; Administer PHQ-A if positive -   Q1: Little interest or pleasure in doing things Not at all   Q2: Feeling down, depressed or hopeless Not at all   PHQ-2 Score 0       Abuse: Current or Past(Physical, Sexual or Emotional)- No  Do you feel safe in your environment? Yes        Social History     Tobacco Use     Smoking status: Former Smoker     Packs/day: 1.00     Types: Cigarettes     Smokeless tobacco: Current User     Last attempt to quit: 9/1/2018   Substance Use Topics     Alcohol use: Yes     Comment: occ -          Alcohol Use 10/6/2022   Prescreen: >3 drinks/day or >7 drinks/week? Yes   Prescreen: >3 drinks/day or >7 drinks/week? -   AUDIT SCORE  8       Last PSA:   PSA   Date Value Ref Range Status   05/01/2019 0.68 0 - 4 ug/L Final     Comment:     Assay Method:   Chemiluminescence using Siemens Vista analyzer       Reviewed orders with patient. Reviewed health maintenance and updated orders accordingly - Yes  Lab work is in process  Labs reviewed in EPIC  BP Readings from Last 3 Encounters:   10/06/22 135/87   03/02/22 121/79   12/03/21 110/75    Wt Readings from Last 3 Encounters:   10/06/22 107.5 kg (237 lb)   03/02/22 110.7 kg (244 lb)   12/03/21 108.9 kg (240 lb 1.6 oz)                  Patient Active Problem List   Diagnosis     Hyperlipidemia LDL goal <160     NAYELY (generalized anxiety disorder)     Tobacco use disorder     BARRINGTON (obstructive sleep apnea)     Obesity (BMI 35.0-39.9) with comorbidity (H)     Unstable angina (H)     Percutaneous transluminal coronary angioplasty status     Past Surgical History:   Procedure Laterality Date     COLONOSCOPY N/A 1/4/2018    Procedure: COMBINED COLONOSCOPY, SINGLE OR MULTIPLE BIOPSY/POLYPECTOMY BY BIOPSY;;  Surgeon: Leeroy Jacobsen MD;  Location: [facility]     COLONOSCOPY WITH CO2 INSUFFLATION N/A 1/4/2018    Procedure: COLONOSCOPY WITH CO2 INSUFFLATION;  COLON-SCREENING / LIMON ;  Surgeon: Leeroy Jacobsen MD;  Location: [facility]     CV CORONARY ANGIOGRAM  11/5/2021    Procedure: CV CORONARY ANGIOGRAM;  Surgeon: Dalton Oliva MD;  Location:  [facility]     CV CORONARY LITHOTRIPSY PCI N/A 10/13/2021    Procedure: CV Coronary Lithotripsy PCI;  Surgeon: Miki Zaragoza MD;  Location:  [facility]ATH [facility]     CV HEART CATHETERIZATION WITH POSSIBLE INTERVENTION N/A 10/13/2021    Procedure: Heart Catheterization with Possible Intervention;  Surgeon: Miki Zaragoza MD;  Location:  [facility]ARD[facility] CATH LAB     CV INTRAVASULAR ULTRASOUND N/A 11/5/2021    Procedure: Intravascular Ultrasound;  Surgeon: Dalton Oliva MD;  Location:  HE[facility] CARD[facility] CATH [facility]     CV PCI ANGIOPLASTY N/A 11/5/2021    Procedure: Percutaneous Transluminal Angioplasty;  Surgeon: Pj  Dalton Collins MD;  Location:  HEART CARDIAC CATH LAB     CV PCI STENT DRUG ELUTING N/A 10/13/2021    Procedure: Percutaneous Coronary Intervention Stent Drug Eluting;  Surgeon: Miki Zaragoza MD;  Location: [facility] CARDIAC C[facility] LAB       Social History     Tobacco Use     Smoking status: Former Smoker     Packs/day: 1.00     Types: Cigarettes     Smokeless tobacco: Current User     Last attempt to quit: 9/1/2018   Substance Use Topics     Alcohol use: Yes     Comment: occ -      Family History   Problem Relation Age of Onset     Dementia Mother      Other Cancer Father         brain      Diabetes Brother          Current Outpatient Medications   Medication Sig Dispense Refill     aspirin (ASA) 81 MG EC tablet Take 1 tablet (81 mg) by mouth daily 90 tablet 2     atorvastatin (LIPITOR) 80 MG tablet Take 1 tablet (80 mg) by mouth daily 90 tablet 1     losartan (COZAAR) 25 MG tablet Take 1 tablet (25 mg) by mouth daily 90 tablet 1     metoprolol tartrate (LOPRESSOR) 25 MG tablet Take 1 tablet (25 mg) by mouth 2 times daily 180 tablet 1     nitroGLYcerin (NITROSTAT) 0.4 MG sublingual tablet For chest pain place 1 tablet under the tongue every 5 minutes for 3 doses. If symptoms persist 5 minutes after 1st dose call 911. 25 tablet 11     prasugrel (EFFIENT) 10 MG TABS tablet Take 1 tablet (10 mg) by mouth daily 90 tablet 1     order for DME Equipment being ordered: CPAP and all necessary supplies (Patient not taking: Reported on 3/2/2022) 1 Device 0     No Known Allergies  Recent Labs   Lab Test 11/06/21  0611 11/05/21  2047 11/05/21  1213 10/13/21  1055 10/13/21  0828 05/01/19  1044 11/30/17  0941 11/14/16  0952 11/05/15  0936   A1C  --   --   --  5.9*  --   --   --   --   --    LDL  --  90  --  69  --  119*   < > 157* 144*   HDL  --  53  --  44  --  44   < > 42 42   TRIG  --  69  --  170*  --  254*   < > 155* 197*   ALT  --   --  62 37  --   --   --   --   --    CR 0.80  --  0.82 0.87   < > 0.91  --   1.00 0.98   GFRESTIMATED >90  --  >90 >90   < > >90  --  79 81   GFRESTBLACK  --   --   --   --   --  >90  --  >90   GFR Calc   >90   GFR Calc     POTASSIUM 3.8  --  4.5 4.6   < > 4.7  --  4.2 4.8   TSH  --   --   --   --   --   --   --   --  2.69    < > = values in this interval not displayed.        Reviewed and updated as needed this visit by clinical staff   Tobacco  Allergies  Meds   Med Hx  Surg Hx  Fam Hx  Soc Hx          Reviewed and updated as needed this visit by Provider                   Past Medical History:   Diagnosis Date     NAYELY (generalized anxiety disorder)      Sleep apnea     cpap      Past Surgical History:   Procedure Laterality Date     COLONOSCOPY N/A 1/4/2018    Procedure: COMBINED COLONOSCOPY, SINGLE OR MULTIPLE BIOPSY/POLYPECTOMY BY BIOPSY;;  Surgeon: Leeroy Jacobsen MD;  Location: [facility]     COLONOSCOPY WITH CO2 INSUFFLATION N/A 1/4/2018    Procedure: COLONOSCOPY WITH CO2 INSUFFLATION;  COLON-SCREENING / LIMON ;  Surgeon: Leeroy Jacobsen MD;  Location: [facility]     CV CORONARY ANGIOGRAM  11/5/2021    Procedure: CV CORONARY ANGIOGRAM;  Surgeon: Dalton Oliva MD;  Location:  [facility] CARDIAC CATH LAB     CV CORONARY LITHOTRIPSY PCI N/A 10/13/2021    Procedure: CV Coronary Lithotripsy PCI;  Surgeon: Miki Zaragoza MD;  Location:  [facility] CARDIAC CATH LAB     CV HEART CATHETERIZATION WITH POSSIBLE INTERVENTION N/A 10/13/2021    Procedure: Heart Catheterization with Possible Intervention;  Surgeon: Miki Zaragoza MD;  Location:  HEART CARDIAC CATH LAB     CV INTRAVASULAR ULTRASOUND N/A 11/5/2021    Procedure: Intravascular Ultrasound;  Surgeon: Dalton Oliva MD;  Location:  HEART CARDIAC CATH LAB     CV PCI ANGIOPLASTY N/A 11/5/2021    Procedure: Percutaneous Transluminal Angioplasty;  Surgeon: Dalton Oliva MD;  Location: [facility] CARDIAC CATH LAB     CV PCI STENT DRUG ELUTING N/A  "10/13/2021    Procedure: Percutaneous Coronary Intervention Stent Drug Eluting;  Surgeon: Miki Zaragoza MD;  Location:  HEART CARDIAC CATH LAB       Review of Systems   Constitutional: Negative for chills and fever.   HENT: Positive for hearing loss. Negative for congestion, ear pain and sore throat.    Eyes: Negative for pain and visual disturbance.   Respiratory: Negative for cough and shortness of breath.    Cardiovascular: Positive for chest pain. Negative for palpitations and peripheral edema.   Gastrointestinal: Negative for abdominal pain, constipation, diarrhea, heartburn, hematochezia and nausea.   Genitourinary: Negative for dysuria, frequency, genital sores, hematuria and urgency.   Musculoskeletal: Negative for arthralgias, joint swelling and myalgias.   Neurological: Negative for dizziness, weakness, headaches and paresthesias.   Psychiatric/Behavioral: Negative for mood changes. The patient is nervous/anxious.      CONSTITUTIONAL: NEGATIVE for fever, chills, change in weight  INTEGUMENTARY/SKIN: NEGATIVE for worrisome rashes, moles or lesions  EYES: NEGATIVE for vision changes or irritation  ENT: NEGATIVE for ear, mouth and throat problems  RESP: NEGATIVE for significant cough or SOB  CV: NEGATIVE for chest pain, palpitations or peripheral edema  GI: NEGATIVE for nausea, abdominal pain, heartburn, or change in bowel habits   male: negative for dysuria, hematuria, decreased urinary stream, erectile dysfunction, urethral discharge  MUSCULOSKELETAL: NEGATIVE for significant arthralgias or myalgia  NEURO: NEGATIVE for weakness, dizziness or paresthesias  PSYCHIATRIC: NEGATIVE for changes in mood or affect    OBJECTIVE:   /87   Pulse 68   Temp 97.8  F (36.6  C) (Tympanic)   Resp 24   Ht 1.772 m (5' 9.75\")   Wt 107.5 kg (237 lb)   SpO2 98%   BMI 34.25 kg/m      Physical Exam  GENERAL: healthy, alert and no distress  EYES: Eyes grossly normal to inspection, PERRL and " conjunctivae and sclerae normal  HENT: ear canals and TM's normal, nose and mouth without ulcers or lesions  NECK: no adenopathy, no asymmetry, masses, or scars and thyroid normal to palpation  RESP: lungs clear to auscultation - no rales, rhonchi or wheezes  CV: regular rate and rhythm, normal S1 S2, no S3 or S4, no murmur, click or rub, no peripheral edema and peripheral pulses strong  ABDOMEN: soft, nontender, no hepatosplenomegaly, no masses and bowel sounds normal  MS: no gross musculoskeletal defects noted, no edema  SKIN: no suspicious lesions or rashes  NEURO: Normal strength and tone, mentation intact and speech normal  PSYCH: mentation appears normal, affect normal/bright    Diagnostic Test Results:  Labs reviewed in Epic    ASSESSMENT/PLAN:       ICD-10-CM    1. Routine general medical examination at a health care facility  Z00.00    2. Morbid obesity (H)  E66.01    3. Coronary artery disease involving native coronary artery of native heart without angina pectoris  I25.10    4. Hyperlipidemia LDL goal <160  E78.5    5. NAYELY (generalized anxiety disorder)  F41.1    6. Coronary artery calcification seen on CAT scan  I25.10 atorvastatin (LIPITOR) 80 MG tablet     metoprolol tartrate (LOPRESSOR) 25 MG tablet     prasugrel (EFFIENT) 10 MG TABS tablet   7. Unstable angina (H)  I20.0 metoprolol tartrate (LOPRESSOR) 25 MG tablet     prasugrel (EFFIENT) 10 MG TABS tablet   8. Percutaneous transluminal coronary angioplasty status  Z98.61 metoprolol tartrate (LOPRESSOR) 25 MG tablet     prasugrel (EFFIENT) 10 MG TABS tablet   9. Benign essential hypertension  I10 losartan (COZAAR) 25 MG tablet     Basic metabolic panel  (Ca, Cl, CO2, Creat, Gluc, K, Na, BUN)   10. Inattention  R41.840 Adult Mental Health  Referral   11. Screening for prostate cancer  Z12.5 PSA, screen     Start losartan.   work on lifestyle modification  Recheck in 6 mos   Patient has been advised of split billing requirements and indicates  "understanding: Yes    COUNSELING:   Reviewed preventive health counseling, as reflected in patient instructions       Regular exercise       Healthy diet/nutrition    Estimated body mass index is 34.25 kg/m  as calculated from the following:    Height as of this encounter: 1.772 m (5' 9.75\").    Weight as of this encounter: 107.5 kg (237 lb).     Weight management plan: Discussed healthy diet and exercise guidelines    He reports that he has quit smoking. His smoking use included cigarettes. He smoked 1.00 pack per day. He uses smokeless tobacco.      Counseling Resources:  ATP IV Guidelines  Pooled Cohorts Equation Calculator  FRAX Risk Assessment  ICSI Preventive Guidelines  Dietary Guidelines for Americans, 2010  USDA's MyPlate  ASA Prophylaxis  Lung CA Screening    RYAN Fu [facility] ARASELI  "

## 2022-10-06 ENCOUNTER — OFFICE VISIT (OUTPATIENT)
Dept: FAMILY MEDICINE | Facility: CLINIC | Age: 57
End: 2022-10-06
Payer: COMMERCIAL

## 2022-10-06 VITALS
WEIGHT: 237 LBS | TEMPERATURE: 97.8 F | HEART RATE: 68 BPM | RESPIRATION RATE: 24 BRPM | BODY MASS INDEX: 33.93 KG/M2 | SYSTOLIC BLOOD PRESSURE: 135 MMHG | HEIGHT: 70 IN | OXYGEN SATURATION: 98 % | DIASTOLIC BLOOD PRESSURE: 87 MMHG

## 2022-10-06 DIAGNOSIS — Z12.5 SCREENING FOR PROSTATE CANCER: ICD-10-CM

## 2022-10-06 DIAGNOSIS — Z00.00 ROUTINE GENERAL MEDICAL EXAMINATION AT A HEALTH CARE FACILITY: Primary | ICD-10-CM

## 2022-10-06 DIAGNOSIS — Z98.61 PERCUTANEOUS TRANSLUMINAL CORONARY ANGIOPLASTY STATUS: ICD-10-CM

## 2022-10-06 DIAGNOSIS — E78.5 HYPERLIPIDEMIA LDL GOAL <160: ICD-10-CM

## 2022-10-06 DIAGNOSIS — I10 BENIGN ESSENTIAL HYPERTENSION: ICD-10-CM

## 2022-10-06 DIAGNOSIS — F41.1 GAD (GENERALIZED ANXIETY DISORDER): ICD-10-CM

## 2022-10-06 DIAGNOSIS — E66.01 MORBID OBESITY (H): ICD-10-CM

## 2022-10-06 DIAGNOSIS — I20.0 UNSTABLE ANGINA (H): ICD-10-CM

## 2022-10-06 DIAGNOSIS — I25.10 CORONARY ARTERY DISEASE INVOLVING NATIVE CORONARY ARTERY OF NATIVE HEART WITHOUT ANGINA PECTORIS: ICD-10-CM

## 2022-10-06 DIAGNOSIS — Z87.891 HISTORY OF SMOKING: ICD-10-CM

## 2022-10-06 DIAGNOSIS — R41.840 INATTENTION: ICD-10-CM

## 2022-10-06 DIAGNOSIS — I25.10 CORONARY ARTERY CALCIFICATION SEEN ON CAT SCAN: ICD-10-CM

## 2022-10-06 PROCEDURE — 99396 PREV VISIT EST AGE 40-64: CPT | Performed by: PHYSICIAN ASSISTANT

## 2022-10-06 PROCEDURE — 80048 BASIC METABOLIC PNL TOTAL CA: CPT | Performed by: PHYSICIAN ASSISTANT

## 2022-10-06 PROCEDURE — 99213 OFFICE O/P EST LOW 20 MIN: CPT | Mod: 25 | Performed by: PHYSICIAN ASSISTANT

## 2022-10-06 PROCEDURE — 36415 COLL VENOUS BLD VENIPUNCTURE: CPT | Performed by: PHYSICIAN ASSISTANT

## 2022-10-06 PROCEDURE — G0103 PSA SCREENING: HCPCS | Performed by: PHYSICIAN ASSISTANT

## 2022-10-06 RX ORDER — LOSARTAN POTASSIUM 25 MG/1
25 TABLET ORAL DAILY
Qty: 90 TABLET | Refills: 1 | Status: SHIPPED | OUTPATIENT
Start: 2022-10-06 | End: 2023-03-21

## 2022-10-06 RX ORDER — METOPROLOL TARTRATE 25 MG/1
25 TABLET, FILM COATED ORAL 2 TIMES DAILY
Qty: 180 TABLET | Refills: 1 | Status: SHIPPED | OUTPATIENT
Start: 2022-10-06 | End: 2022-11-23

## 2022-10-06 RX ORDER — VARENICLINE TARTRATE 0.5 MG/1
0.5 TABLET, FILM COATED ORAL 2 TIMES DAILY
Qty: 11 TABLET | Refills: 0 | Status: SHIPPED | OUTPATIENT
Start: 2022-10-06

## 2022-10-06 RX ORDER — VARENICLINE TARTRATE 1 MG/1
1 TABLET, FILM COATED ORAL 2 TIMES DAILY
Qty: 60 TABLET | Refills: 1 | Status: SHIPPED | OUTPATIENT
Start: 2022-10-06

## 2022-10-06 RX ORDER — PRASUGREL 10 MG/1
10 TABLET, FILM COATED ORAL DAILY
Qty: 90 TABLET | Refills: 1 | Status: SHIPPED | OUTPATIENT
Start: 2022-10-06 | End: 2022-11-23

## 2022-10-06 RX ORDER — ATORVASTATIN CALCIUM 80 MG/1
80 TABLET, FILM COATED ORAL DAILY
Qty: 90 TABLET | Refills: 1 | Status: SHIPPED | OUTPATIENT
Start: 2022-10-06 | End: 2023-03-21

## 2022-10-06 ASSESSMENT — ENCOUNTER SYMPTOMS
NERVOUS/ANXIOUS: 1
ABDOMINAL PAIN: 0
HEARTBURN: 0
DIZZINESS: 0
SORE THROAT: 0
CHILLS: 0
HEMATURIA: 0
EYE PAIN: 0
CONSTIPATION: 0
NAUSEA: 0
COUGH: 0
DYSURIA: 0
PALPITATIONS: 0
HEADACHES: 0
FREQUENCY: 0
SHORTNESS OF BREATH: 0
HEMATOCHEZIA: 0
PARESTHESIAS: 0
WEAKNESS: 0
ARTHRALGIAS: 0
FEVER: 0
MYALGIAS: 0
DIARRHEA: 0
JOINT SWELLING: 0

## 2022-10-06 ASSESSMENT — PAIN SCALES - GENERAL: PAINLEVEL: NO PAIN (0)

## 2022-10-07 LAB
ANION GAP SERPL CALCULATED.3IONS-SCNC: 7 MMOL/L (ref 3–14)
BUN SERPL-MCNC: 14 MG/DL (ref 7–30)
CALCIUM SERPL-MCNC: 9.3 MG/DL (ref 8.5–10.1)
CHLORIDE BLD-SCNC: 102 MMOL/L (ref 94–109)
CO2 SERPL-SCNC: 29 MMOL/L (ref 20–32)
CREAT SERPL-MCNC: 0.94 MG/DL (ref 0.66–1.25)
GFR SERPL CREATININE-BSD FRML MDRD: >90 ML/MIN/1.73M2
GLUCOSE BLD-MCNC: 98 MG/DL (ref 70–99)
POTASSIUM BLD-SCNC: 3.8 MMOL/L (ref 3.4–5.3)
PSA SERPL-MCNC: 0.92 UG/L (ref 0–4)
SODIUM SERPL-SCNC: 138 MMOL/L (ref 133–144)

## 2022-10-09 ENCOUNTER — HEALTH MAINTENANCE LETTER (OUTPATIENT)
Age: 57
End: 2022-10-09

## 2022-10-17 ENCOUNTER — TELEPHONE (OUTPATIENT)
Dept: FAMILY MEDICINE | Facility: CLINIC | Age: 57
End: 2022-10-17

## 2022-10-17 DIAGNOSIS — H10.33 ACUTE BACTERIAL CONJUNCTIVITIS OF BOTH EYES: Primary | ICD-10-CM

## 2022-10-17 RX ORDER — POLYMYXIN B SULFATE AND TRIMETHOPRIM 1; 10000 MG/ML; [USP'U]/ML
1-2 SOLUTION OPHTHALMIC EVERY 4 HOURS
Qty: 10 ML | Refills: 1 | Status: SHIPPED | OUTPATIENT
Start: 2022-10-17

## 2022-10-17 NOTE — TELEPHONE ENCOUNTER
Patient called the clinic and was informed of the prescription for Polytrim eye drops.     He verbalized a good understanding and will call back if things are not improving.     Molly Gunderson RN BSN  [facility]

## 2022-10-17 NOTE — TELEPHONE ENCOUNTER
Patient was seen in the clinic for his Annual Physical with PCP on 10/6/22.     Please review note below and advise.     Molly Gunderson RN BSN  [facility]

## 2022-10-17 NOTE — TELEPHONE ENCOUNTER
Reason for call:  Other   Patient called regarding (reason for call): call back  Additional comments: Patient is calling because he has pink eye symptoms, eyes are red with puss. Patient would like to get medication to treat but does not want to come in for an appointment as he lives far away. Patient declined virtual and E-visit at this time.     Phone number to reach patient:  Home number on file [phone] (home)    Best Time:  Any time    Can we leave a detailed message on this number?  YES    Travel screening: Not Applicable

## 2022-11-23 ENCOUNTER — OFFICE VISIT (OUTPATIENT)
Dept: CARDIOLOGY | Facility: CLINIC | Age: 57
End: 2022-11-23
Attending: STUDENT IN AN ORGANIZED HEALTH CARE EDUCATION/TRAINING PROGRAM
Payer: COMMERCIAL

## 2022-11-23 VITALS
BODY MASS INDEX: 34.31 KG/M2 | WEIGHT: 237.4 LBS | OXYGEN SATURATION: 99 % | HEART RATE: 78 BPM | SYSTOLIC BLOOD PRESSURE: 137 MMHG | DIASTOLIC BLOOD PRESSURE: 84 MMHG

## 2022-11-23 DIAGNOSIS — I20.0 UNSTABLE ANGINA (H): ICD-10-CM

## 2022-11-23 DIAGNOSIS — I25.10 CORONARY ARTERY CALCIFICATION SEEN ON CAT SCAN: ICD-10-CM

## 2022-11-23 DIAGNOSIS — E78.5 HYPERLIPIDEMIA LDL GOAL <160: ICD-10-CM

## 2022-11-23 DIAGNOSIS — Z98.61 PERCUTANEOUS TRANSLUMINAL CORONARY ANGIOPLASTY STATUS: ICD-10-CM

## 2022-11-23 PROCEDURE — 99215 OFFICE O/P EST HI 40 MIN: CPT | Performed by: STUDENT IN AN ORGANIZED HEALTH CARE EDUCATION/TRAINING PROGRAM

## 2022-11-23 RX ORDER — METOPROLOL SUCCINATE 100 MG/1
100 TABLET, EXTENDED RELEASE ORAL DAILY
Qty: 90 TABLET | Refills: 3 | Status: SHIPPED | OUTPATIENT
Start: 2022-11-23 | End: 2023-10-12

## 2022-11-23 NOTE — PATIENT INSTRUCTIONS
You were seen at the [facility] Cardiology clinic today.   You saw Dr. Ramiro Costa, [facility], [place].    The following is a summary of your office visit today:    Finish your existing prasugrel (Effient) and then stop it completely  Change metoprolol tartrate 25 mg twice daily to metoprolol succinate to 100 mg once daily   Try and measure your blood pressure when you have the throbbing sensation   Please try and pursue moderate-intensity exercise for 30 minutes at least five times weekly.  Please try and maintain a diet rich in fruit and vegetables and low in saturated fats and sugars.  Follow-up with 3 months     Nurse contact information:    Cardiology Care Coordinators: Luann Hester RN   Phone: [phone]  Fax: [phone]      HOW TO CHECK YOUR BLOOD PRESSURE AT HOME:     Avoid eating, smoking, and exercising for at least 30 minutes before taking a reading.    Be sure you have taken your BP medication at least 2-3 hours before you check it.     Sit quietly for 10 minutes before a reading.     Sit in a chair with your feet flat on the floor. Rest your  arm on a table so that the arm cuff is at the same level as your heart.    Remain still during the reading.    Record your blood pressure and pulse in a log and bring to your next appointment.     If you have had any blood work, imaging or other testing completed we will be in touch within 1-2 weeks regarding the results. If you have any questions, concerns or need to schedule a follow up, please contact us at [phone]. If you are needing refills please contact your pharmacy. For urgent after hour care please call the [facility] at [phone] or the [facility] at [phone] and ask to speak to the on-call Cardiologist.    It was a pleasure meeting with you today. Please let us know if there is anything else we can do for you so that we can be sure you are leaving completely satisfied with your  care experience.     Your Cardiology Team at [facility]  RN Care Coordinator: Luann  CMA: Lavinia

## 2022-11-23 NOTE — PROGRESS NOTES
Chief Complaint: Coronary artery calcifications on CT     HPI: Harjit Medina is a 56 year old male with a past medical history of obstructive sleep apnea (on CPAP) who was referred to me for evaluation of incidentally discovered coronary artery calcifications on lung cancer screening CT by Mr. Piter Maynard.     Briefly, Mr. Medina had a lung cancer screening CT earlier this summer. This was unremarkable for high-risk lung lesions but he was found to have extensive coronary artery calcifications. In view of this, he was referred to Cardiology for further evaluation.    At the time of the consultation, Mr. Medina notes that he has had brief periods of chest tightness while sitting down.  The chest tightness lasts for less than a minute on each episode and he has about one episode per week.  He noticed the first episode about 6 months ago.  He has no relieving or exacerbating factors.  There is no radiation.  Mr. Medina notes an absence of chest pain with exertion, dyspnea at rest or with exertion, PND, orthopnea, peripheral edema, palpitations, lightheadedness or syncope.  Mr. Mercedes notes that he has 2 large [place] shepherds which he takes walking at least 2 miles per day.  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  Mr. Medina has never had any exertional chest tightness during these activities.    A comprehensive ROS was done and the details are included above in the HPI.    Interval History (10/13/2021):  Since his last visit, Mr. Medina, underwent his coronary CTA.  The day before his course he taken it, he had a very close friend from childhood passed away.  He was extremely distressed by this.  Shortly after undergoing his CTA, Mr. Medina described having constant, substernal chest pressure.  He was subsequent sent to the cardiac catheterization laboratory where he was noted to have three-vessel coronary disease on invasive coronary  angiogram.  He underwent lithotripsy of his mid LAD lesion on the day as well as PCI to the left circumflex and OM 2.  I had followed up with him by phone on October 14, at which point he had noted that he had complete resolution of all of his chest pain type symptoms.  He was also noted to have severe disease of his right coronary artery but this was deferred for staged PCI.  While waiting for his PCI, he began to experience the symptoms again on November 5 (approximately 2 weeks later).  This prompted an ER visit, when he ended up getting his RCA PCI.  I again followed up with him by phone on November 8 and he mentioned that he had complete resolution of all his symptoms that as well.    Since then, Mr. Medina notes that he is progressing well with cardiac rehabis walking two miles daily with his two [place] shepherds. He has occasional chest fullness at night, but only when at rest while watching television. It resolves spontaneously after a few minutes and deep breathing helps to speed up the resolution. There is no exertional component. He had one episode of bleeding when he cut his finger recently, but has not had any sequelae of bleeding other than that.  He has not missed any doses of his aspirin or prasugrel.    A comprehensive ROS was done and the details are included above in the HPI.    Interval History 03/02/2022:  Since his last visit, Mr. Medina notes that he has been completely devoid of angina.  He continues to adhere to a diet low in saturated fats and sugars and has dramatically increase the intake of food vegetables.  Mr. Medina also continues to walk at least 2 miles daily with his wife.  He is taking aspirin prasugrel as instructed without any missed doses.  He has not had any sequelae of bleeding.    Mr. Medina's only complaint was some chest burning that occurred when he reclined in has chair.  He does this soon after eating and has a burning sensation when he reclines.   The burning sensation  subsides immediately when he sits up.    A comprehensive ROS was done and the details are included above in the HPI.    Interval History 10/26/22:    Since Mr. Medina's last visit, he notes he continues to have occasional episodes of chest burning.  These largely continued occur when he is reclining in his chair after dinner.however, since his last visit he notes that they can also occur after he is working out on the car lot doing physical work, such as polishing cars.  The chest burning usually resolves after sitting for a few minutes.  He has not had any sequelae of bleeding. Mr. Medina was also started on losartan since his last visit    A comprehensive ROS was done and the details are included above in the HPI.      Past Medical History:  - Coronary artery disease status post PCI of mLAD, LCx, and OM2 (10/13/2021) and RCA (11/05/2021)   - Smoking  - BARRINGTON on CPAP   - No prior history of hypertension, T2DM, dyslipidemia, TIA/stroke, vascular aneurysms, PAD  Past Medical History:   Diagnosis Date     NAYELY (generalized anxiety disorder)      Sleep apnea     cpap       Past Surgical History:    Past Surgical History:   Procedure Laterality Date     COLONOSCOPY N/A 1/4/2018    Procedure: COMBINED COLONOSCOPY, SINGLE OR MULTIPLE BIOPSY/POLYPECTOMY BY BIOPSY;;  Surgeon: Leeroy Jacobsen MD;  Location: [facility] OR     COLONOSCOPY WITH CO2 INSUFFLATION N/A 1/4/2018    Procedure: COLONOSCOPY WITH CO2 INSUFFLATION;  COLON-SCREENING / LIMON ;  Surgeon: Leeroy Jacobsen MD;  Location: [facility] OR     CV CORONARY ANGIOGRAM  11/5/2021    Procedure: CV CORONARY ANGIOGRAM;  Surgeon: Dalton Oliva MD;  Location: [facility] CARDIAC CATH LAB     CV CORONARY LITHOTRIPSY PCI N/A 10/13/2021    Procedure: CV Coronary Lithotripsy PCI;  Surgeon: Miki Zaragoza MD;  Location:  [facility] C[facility] CATH LAB     CV HEART CATHETERIZATION WITH POSSIBLE INTERVENTION N/A 10/13/2021     Procedure: Heart Catheterization with Possible Intervention;  Surgeon: Miki Zaragoza MD;  Location: [facility] CARDIAC CATH LAB     CV INTRAVASULAR ULTRASOUND N/A 11/5/2021    Procedure: Intravascular Ultrasound;  Surgeon: Dalton Oliva MD;  Location: [facility] CARDIAC CATH LAB     CV PCI ANGIOPLASTY N/A 11/5/2021    Procedure: Percutaneous Transluminal Angioplasty;  Surgeon: Dalton Oliva MD;  Location: [facility] CARDIAC CATH LAB     CV PCI STENT DRUG ELUTING N/A 10/13/2021    Procedure: Percutaneous Coronary Intervention Stent Drug Eluting;  Surgeon: Miki Zaragoza MD;  Location: [facility] CARDIAC CATH LAB       Drug History:  Home cardiac meds: Aspirin 81 mg once daily, atorvastatin 80 mg once daily, losartan 25 mg q24h, metoprolol tartrate 25 mg twice daily, prasugrel 10 mg once daily.  Current Outpatient Medications   Medication Sig Dispense Refill     aspirin (ASA) 81 MG EC tablet Take 1 tablet (81 mg) by mouth daily 90 tablet 2     atorvastatin (LIPITOR) 80 MG tablet Take 1 tablet (80 mg) by mouth daily 90 tablet 1     losartan (COZAAR) 25 MG tablet Take 1 tablet (25 mg) by mouth daily 90 tablet 1     metoprolol tartrate (LOPRESSOR) 25 MG tablet Take 1 tablet (25 mg) by mouth 2 times daily 180 tablet 1     nitroGLYcerin (NITROSTAT) 0.4 MG sublingual tablet For chest pain place 1 tablet under the tongue every 5 minutes for 3 doses. If symptoms persist 5 minutes after 1st dose call 911. 25 tablet 11     order for DME Equipment being ordered: CPAP and all necessary supplies 1 Device 0     prasugrel (EFFIENT) 10 MG TABS tablet Take 1 tablet (10 mg) by mouth daily 90 tablet 1     trimethoprim-polymyxin b (POLYTRIM) 78627-0.1 UNIT/ML-% ophthalmic solution Place 1-2 drops into both eyes every 4 hours 10 mL 1     varenicline (CHANTIX) 0.5 MG tablet Take 1 tablet (0.5 mg) by mouth 2 times daily Take one tab daily for 3 days, then take one tab twice a day for 4 days, then  increase to the 1 mg dose. 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   Social History     Tobacco Use     Smoking status: Former     Packs/day: 1.00     Types: Cigarettes     Smokeless tobacco: Current     Last attempt to quit: 9/1/2018   Substance Use Topics     Alcohol use: Yes     Comment: occ -        No Known Allergies      Physical Examination:  Vitals: /84 (BP Location: Left arm, Patient Position: Chair, Cuff Size: Adult Large)   Pulse 78   Wt 107.7 kg (237 lb 6.4 oz)   SpO2 99%   BMI 34.31 kg/m    BMI= Body mass index is 34.31 kg/m .    GENERAL: Healthy, alert and no distress  Eyes: No xanthelasmas.  NECK: No palpable neck masses.  ENT: Moist mucosal membranes.  RESPIRATORY: No signs of resp distress.  Chest clear to auscultation bilaterally.  CARDIOVASCULAR:  No JVD, pulse regular, S1 plus S2 without any added sounds or murmurs.  EXTREMITIES: Warm, well-perfused, no edema.   NEUROLOGY: GCS 15/15, no focal deficits.  SKIN: No ecchymoses, no rashes.  PSYCH: Cooperative, pleasant affect.       Investigations:  I personally viewed and interpreted the following investigations:    Labs:    CBC RESULTS:  Lab Results   Component Value Date    WBC 11.9 (H) 11/06/2021    WBC 15.0 (H) 09/27/2016    RBC 4.72 11/06/2021    RBC 4.81 09/27/2016    HGB 15.1 11/06/2021    HGB 15.8 09/27/2016    HCT 43.6 11/06/2021    HCT 46.1 09/27/2016    MCV 92 11/06/2021    MCV 96 09/27/2016    MCH 32.0 11/06/2021    MCH 32.8 09/27/2016    MCHC 34.6 11/06/2021    MCHC 34.3 09/27/2016    RDW 12.7 11/06/2021    RDW 12.8 09/27/2016     11/06/2021     09/27/2016       CMP  RESULTS:  Lab Results   Component Value Date     10/06/2022     05/01/2019    POTASSIUM 3.8 10/06/2022    POTASSIUM 4.7 05/01/2019    CHLORIDE 102 10/06/2022    CHLORIDE 103 05/01/2019    CO2 29 10/06/2022    CO2 25 05/01/2019    ANIONGAP 7 10/06/2022    ANIONGAP 9 05/01/2019    GLC 98 10/06/2022    GLC 88 05/01/2019    BUN 14 10/06/2022    BUN 14 05/01/2019    CR 0.94 10/06/2022    CR 0.91 05/01/2019    GFRESTIMATED >90 10/06/2022    GFRESTIMATED >60 10/13/2021    GFRESTIMATED >90 05/01/2019    GFRESTBLACK >90 05/01/2019    NIYA 9.3 10/06/2022    NIYA 9.2 05/01/2019    BILITOTAL 1.3 11/05/2021    ALBUMIN 4.1 11/05/2021    ALKPHOS 124 11/05/2021    ALT 62 11/05/2021    AST 24 11/05/2021          LIPIDS:  Lab Results   Component Value Date    CHOL 214 05/01/2019     Lab Results   Component Value Date    HDL 44 05/01/2019     Lab Results   Component Value Date     05/01/2019     Lab Results   Component Value Date    TRIG 254 05/01/2019     Lab Results   Component Value Date    CHOLHDLRATIO 5.4 11/05/2015       Recent Tests:    Electrocardiogram (09/01/2021):  Normal sinus rhythm, normal axis, normal intervals, poor R wave progression.      CT Lung Cancer Screening 07/28/2021:                                                                   IMPRESSION:   1. ACR Assessment Category:  Lung-RADS Category 2. Benign appearance or behavior. Recommendation: Continue annual screening, if clinically relevant (please order exam code IMG 2290). .   2. Significant Incidental Finding(s):  Category S: Yes.  *  Extensive atherosclerotic vascular calcification of the coronary arteries and thoracic aorta.    *  Hepatic steatosis.  *  Cholelithiasis without CT evidence of acute cholecystitis.    Coronary Angiogram 10/13/2021:  1.  Severe three vessel CAD.  2. Lithotripsy of the mid LAD lesion.  3. PCI with four drug eluting stents [two to the LAD (2.75x38 and 3.5x12) and two to the LCx (proximal 3.0 x 16 and mid-distal  with 2.5 x 48 mm) and OM2).      Coronary Angiogram 11/05/2021:   1.Successful Balloon angioplasty to the under expanded stent in the LAD as seen on IVUS.  2. Successful PCI of the distal to proximal RCA with 4 stents (2.5 x 16 mm stent in dRCA, 2.5 x 16 and 2.5 x 24 mm stents in mRCA, and 3.0 x 32 mm stents in proximal RCA). There was residual disease that needs to be treated medically.    Assessment and Plan:   Harjit Medina is a 56 year old male with a past medical history of smoking and incidentally discovered coronary calcification on a lung cancer screening CT was initially referred to me in September 2021 for evaluation of the coronary calcifications.    Mr. Medina continues to have some angina.  I talked to him about increasing metoprolol to help manage his exertional symptoms.  This may also offer some modest benefit for blood pressure reduction.  He was agreeable to this after we discussed the possible side effects.  I also talked to Mr. Medina about stopping his prasugrel given that its been over 1 year since his PCI.  He notes that he did recently got a three month refill.  Hence, he can complete the course of his refill and stop it permanently afterwards.    Problems:  1. Coronary artery disease, status post PCI of mLAD, LCx, and OM2 (10/13/2021) and RCA (11/05/2021)   2. Current smoking  3. Obesity  4. BARRINGTON on CPAP     Plan:  - Increase metoprolol to succinate 100 mg once daily  - Continue losartan 25 mg once daily  - Stop prasugrel  - Continue taking aspirin 81 mg lifelong and atorvastatin 80 mg once daily      A total of 40 minutes were spent on the day of the visit for chart review, care coordination, face-to-face consultation with the patient, and documentation.       Ramiro Costa [facility], MS    Cardiovascular Division  Pager [phone]    CC  Patient Care Team:  Piter Maynard PA-C as PCP - General (Physician Assistant)  Piter Maynard PA-C as Assigned PCP  Julissa  MD Ramiro as Assigned Heart and Vascular Provider

## 2022-11-23 NOTE — NURSING NOTE
Harjit Medina's goals for this visit include:   Chief Complaint   Patient presents with     Follow Up     6 month follow up        He requests these members of his care team be copied on today's visit information: yes     PCP: Piter Maynard    Referring Provider:  Ramiro Costa MD  [address] [place] [id number]    /84 (BP Location: Left arm, Patient Position: Chair, Cuff Size: Adult Large)   Pulse 78   Wt 107.7 kg (237 lb 6.4 oz)   SpO2 99%   BMI 34.31 kg/m      Do you need any medication refills at today's visit? No       SUZY Lovell   Cardiology Team  [facility]

## 2022-12-27 DIAGNOSIS — I25.10 CORONARY ARTERY CALCIFICATION SEEN ON CAT SCAN: ICD-10-CM

## 2022-12-27 NOTE — TELEPHONE ENCOUNTER
11/23/2022 [address]VD Dr Costa/ Cardiology  [facility]

## 2022-12-29 DIAGNOSIS — N52.8 OTHER MALE ERECTILE DYSFUNCTION: ICD-10-CM

## 2022-12-29 RX ORDER — SILDENAFIL CITRATE 20 MG/1
TABLET ORAL
Qty: 30 TABLET | Refills: 11 | OUTPATIENT
Start: 2022-12-29

## 2023-01-26 ENCOUNTER — VIRTUAL VISIT (OUTPATIENT)
Dept: PSYCHOLOGY | Facility: CLINIC | Age: 58
End: 2023-01-26
Attending: PHYSICIAN ASSISTANT
Payer: COMMERCIAL

## 2023-01-26 DIAGNOSIS — F88 DEVELOPMENTAL MENTAL DISORDER: Primary | ICD-10-CM

## 2023-01-26 PROCEDURE — 90834 PSYTX W PT 45 MINUTES: CPT | Mod: GT

## 2023-01-26 ASSESSMENT — ANXIETY QUESTIONNAIRES
1. FEELING NERVOUS, ANXIOUS, OR ON EDGE: SEVERAL DAYS
3. WORRYING TOO MUCH ABOUT DIFFERENT THINGS: SEVERAL DAYS
2. NOT BEING ABLE TO STOP OR CONTROL WORRYING: NOT AT ALL
GAD7 TOTAL SCORE: 4
GAD7 TOTAL SCORE: 4
7. FEELING AFRAID AS IF SOMETHING AWFUL MIGHT HAPPEN: SEVERAL DAYS
5. BEING SO RESTLESS THAT IT IS HARD TO SIT STILL: NOT AT ALL
6. BECOMING EASILY ANNOYED OR IRRITABLE: NOT AT ALL
IF YOU CHECKED OFF ANY PROBLEMS ON THIS QUESTIONNAIRE, HOW DIFFICULT HAVE THESE PROBLEMS MADE IT FOR YOU TO DO YOUR WORK, TAKE CARE OF THINGS AT HOME, OR GET ALONG WITH OTHER PEOPLE: NOT DIFFICULT AT ALL

## 2023-01-26 ASSESSMENT — PATIENT HEALTH QUESTIONNAIRE - PHQ9
5. POOR APPETITE OR OVEREATING: SEVERAL DAYS
SUM OF ALL RESPONSES TO PHQ QUESTIONS 1-9: 8

## 2023-01-26 ASSESSMENT — COLUMBIA-SUICIDE SEVERITY RATING SCALE - C-SSRS
ATTEMPT LIFETIME: NO
1. HAVE YOU WISHED YOU WERE DEAD OR WISHED YOU COULD GO TO SLEEP AND NOT WAKE UP?: NO
2. HAVE YOU ACTUALLY HAD ANY THOUGHTS OF KILLING YOURSELF?: NO

## 2023-02-02 ASSESSMENT — COLUMBIA-SUICIDE SEVERITY RATING SCALE - C-SSRS
ATTEMPT LIFETIME: NO
TOTAL  NUMBER OF INTERRUPTED ATTEMPTS LIFETIME: NO
TOTAL  NUMBER OF ABORTED OR SELF INTERRUPTED ATTEMPTS LIFETIME: NO
6. HAVE YOU EVER DONE ANYTHING, STARTED TO DO ANYTHING, OR PREPARED TO DO ANYTHING TO END YOUR LIFE?: NO
1. HAVE YOU WISHED YOU WERE DEAD OR WISHED YOU COULD GO TO SLEEP AND NOT WAKE UP?: NO
2. HAVE YOU ACTUALLY HAD ANY THOUGHTS OF KILLING YOURSELF?: NO

## 2023-02-02 NOTE — PROGRESS NOTES
[facility]   Mental Health & Addiction Services     Progress Note - Initial Visit    Client Name:  Harjit Medina Date: 2023         Service Type: Individual     Visit Start Time: 9:00am  Visit End Time: 9:52am    Visit #: 1    Attendees: Client attended alone    Service Modality:  Video Visit:      Provider verified identity through the following two step process.  Patient provided:  Patient     Telemedicine Visit: The patient's condition can be safely assessed and treated via synchronous audio and visual telemedicine encounter.      Reason for Telemedicine Visit: Services only offered telehealth    Originating Site (Patient Location): Patient's home    Distant Site (Provider Location): [facility] MENTAL HEALTH & ADDICTION [facility]    Consent:  The patient/guardian has verbally consented to: the potential risks and benefits of telemedicine (video visit) versus in person care; bill my insurance or make self-payment for services provided; and responsibility for payment of non-covered services.     Patient would like the video invitation sent by:  Send to e-mail at: [email or web address]    Mode of Communication:  Video Conference via Amwell    Distant Location (Provider):  On-site    As the provider I attest to compliance with applicable laws and regulations related to telemedicine.       DATA:  Extended Session (53+ minutes): No  Interactive Complexity: No   Crisis: No     Presenting Concerns/Current Stressors:   Patient presented to session to initiate the ADHD evaluation process.       PHQ 3/4/2020 2021 2023   PHQ-9 Total Score 2 5 8   Q9: Thoughts of better off dead/self-harm past 2 weeks Not at all Not at all Not at all        NAYELY-7 SCORE 2018   Total Score 6 5 4       ASSESSMENT:  Mental Status Assessment:  Appearance:   Appropriate   Eye Contact:   Good   Psychomotor Behavior: Normal    Attitude:   Cooperative   Orientation:   All  Speech   Rate / Production: Normal/ Responsive   Volume:  Normal   Mood:    Normal  Affect:    Appropriate   Thought Content:  Clear   Thought Form:  Coherent   Insight:    Good       Safety Issues and Plan for Safety and Risk Management:     [place] Suicide Severity Rating Scale (Lifetime/Recent)  [place] Suicide Severity Rating (Lifetime/Recent) 1/26/2023 2/2/2023   1. Wish to be Dead (Lifetime) 0 0   2. Non-Specific Active Suicidal Thoughts (Lifetime) 0 0   Actual Attempt (Lifetime) 0 0   Has subject engaged in non-suicidal self-injurious behavior? (Lifetime) - 0   Interrupted Attempts (Lifetime) - 0   Aborted or Self-Interrupted Attempt (Lifetime) - 0   Preparatory Acts or Behavior (Lifetime) - 0   Calculated C-SSRS Risk Score (Lifetime/Recent) No Risk Indicated No Risk Indicated     Patient denies current fears or concerns for personal safety.  Patient denies current or recent suicidal ideation or behaviors.  Patient denies current or recent homicidal ideation or behaviors.  Patient denies current or recent self injurious behavior or ideation.  Patient denies other safety concerns.  Recommended that patient call 911 or go to the local ED should there be a change in any of these risk factors.   Patient reports there are no firearms in the house.    Diagnostic Criteria:  F88  A. A persistent pattern of inattention and/or hyperactivity-impulsivity that interferes with functioning or development, as characterized by (1) and/or (2):   1. Six or more inattention symptoms that have persisted for at least 6 months to a degree that is inconsistent with developmental level and that negatively impacts directly on social and academic/occupational activities.   2. Six or more hyperactivity and impulsivity symptoms that have persisted for at least 6 months to a degree that is inconsistent with developmental level and that negatively impacts directly on social and  academic/occupational activities.  B. Several symptoms (inattentive or hyperactive/impulsive) were present before the age of 12 years.  C. Several symptoms (inattentive or hyperactive/impulsive) present in ?2 settings (eg, at home, school, or work; with friends or relatives; in other activities).  D. There is clear evidence that the symptoms interfere with or reduce the quality of social, academic, or occupational functioning.  E. Symptoms do not occur exclusively during the course of schizophrenia or another psychotic disorder, and are not better explained by another mental disorder (eg, mood disorder, anxiety disorder, dissociative disorder, personality disorder, substance intoxication, or withdrawal).          DSM5 Diagnoses: (Sustained by DSM5 Criteria Listed Above)  Diagnoses:   1. Developmental mental disorder      Psychosocial & Contextual Factors: Client reported no housing or financial concerns. Client reported having a good social support network.    PROMIS-10 Scores  Global Mental Health Score: 16  Global Physical Health Score: 18   PROMIS TOTAL - SUBSCORES: 34      Intervention:              Reviewed symptoms and history of presenting concern. Patient endorsed symptoms consistent with ADHD. Patient denied symptoms associated with depression , anxiety , cris, panic, OCD, trauma, perceptual difficulties and disordered eating. Unable to complete diagnostic intake, will be completed in next session.  CBT: socratic questioning, positive reinforcement  EFT: empathetic attunement, emotion checking, emotion naming  MI: open ended questions, affirmations, reflections        Attendance Agreement:  Client has not signed the attendance agreement. Discussed expectations at beginning of this first session and patient agreed.       PLAN:  Provider will continue Diagnostic Assessment in next session. Patient will complete Od[place] questionnaires  and CNS Vital Signs prior to next session.    Patient meets the following  risk assessment and triage: Patient denied any current/recent/lifetime history of suicidal ideation and/or behaviors.  No safety plan indicated at this time.     Medical necessity criteria is warranted in order to: Measure a psychological disorder and its severity and functional impairment to determine psychiatric diagnosis when a mental illness is suspected, or to achieve a differential diagnosis from a range of medical/psychological disorders that present with similar constellations of symptoms (e.g., determination and measurement of anxiety severity and impact in the presence of ongoing asthma or heart disease), Perform symptom measurement to objectively measure treatment effectiveness and/or determine the need to refer for pharmacological treatment or other medical evaluation (e.g., based on severity and chronicity of symptoms) and Evaluate primary symptoms of impaired attention and concentration that can occur in many neurological and psychiatric conditions.    Medical necessity for psychological assessment is warranted as a result of the following: (1) A specific clinical question is posed that relates to the condition/symptoms being addressed (2) The question cannot be adequately addressed by clinical interview and/or behavioral observation (3) Results of psychological testing are reasonably expected to provide an answer to the query (4) It is reasonably expected that the testing will provide information leading to a clearer diagnosis and/or guide treatment planning with an expectation of improved clinical outcome.    I acknowledge that, based upon current clinical information, the patient and I have reviewed and discussed issues pertaining to the purpose of therapy/testing, potential therapeutic goals, procedures, risks and benefits, and estimated duration of therapy/testing. Issues pertaining to fees/insurance and confidentiality were also addressed with the patient, who indicated understanding and elected  to continue with appointments. I will not be providing any experimental procedures and, if we agree that a change in clinical procedure would be more beneficial, I will obtain specific consent for that procedure or refer you to another provider who has expertise in that area.       Gisela Noriega MA  Doctoral Candidate Psychology Practicum Student

## 2023-02-08 DIAGNOSIS — N52.8 OTHER MALE ERECTILE DYSFUNCTION: ICD-10-CM

## 2023-02-09 ENCOUNTER — VIRTUAL VISIT (OUTPATIENT)
Dept: PSYCHOLOGY | Facility: CLINIC | Age: 58
End: 2023-02-09
Payer: COMMERCIAL

## 2023-02-09 ENCOUNTER — TELEPHONE (OUTPATIENT)
Dept: FAMILY MEDICINE | Facility: CLINIC | Age: 58
End: 2023-02-09

## 2023-02-09 DIAGNOSIS — F88 DEVELOPMENTAL MENTAL DISORDER: Primary | ICD-10-CM

## 2023-02-09 PROCEDURE — 90791 PSYCH DIAGNOSTIC EVALUATION: CPT | Mod: VID

## 2023-02-09 RX ORDER — SILDENAFIL CITRATE 20 MG/1
TABLET ORAL
Qty: 30 TABLET | Refills: 11 | OUTPATIENT
Start: 2023-02-09

## 2023-02-09 NOTE — TELEPHONE ENCOUNTER
"Cardiology discontinued this medication. OV notes 11/23/22 \"He notes that he did recently got a three month refill.  Hence, he can complete the course of his refill and stop it permanently afterwards.\"    Called patient and informed patient of this and he stated he forgot about that.     Called and updated pharmacy as well.     Stella Leon RN    "

## 2023-02-09 NOTE — TELEPHONE ENCOUNTER
Reason for Call:  Other     Detailed comments: Patient was unaware that medication prafugrel was closed out and pharmacy needs to clarify because patient is asking for a refill    Phone [facility]     Best Time:     Can we leave a detailed message on this number? Not Applicable    Call taken on 2/9/2023 at 2:07 PM by Camille Barahona
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
## 2023-03-14 ENCOUNTER — TELEPHONE (OUTPATIENT)
Dept: FAMILY MEDICINE | Facility: CLINIC | Age: 58
End: 2023-03-14
Payer: COMMERCIAL

## 2023-03-20 NOTE — TELEPHONE ENCOUNTER
Patient calling to speak with Piter Maynard only. Patient has questions on a prescription. Patient only wants to speak with Piter. TC did inform patient provider seeing patients.  
Schedule bibiana for a virtual visit with me as he's due for a 6 mos f/up anyway.we can talk about his medication concerns.  
Scheduled 03/21/2023  
Yes

## 2023-03-21 ENCOUNTER — VIRTUAL VISIT (OUTPATIENT)
Dept: FAMILY MEDICINE | Facility: CLINIC | Age: 58
End: 2023-03-21
Payer: COMMERCIAL

## 2023-03-21 DIAGNOSIS — I25.10 CORONARY ARTERY CALCIFICATION SEEN ON CAT SCAN: ICD-10-CM

## 2023-03-21 DIAGNOSIS — F90.0 ATTENTION DEFICIT HYPERACTIVITY DISORDER (ADHD), PREDOMINANTLY INATTENTIVE TYPE: Primary | ICD-10-CM

## 2023-03-21 DIAGNOSIS — N52.8 OTHER MALE ERECTILE DYSFUNCTION: ICD-10-CM

## 2023-03-21 DIAGNOSIS — I10 BENIGN ESSENTIAL HYPERTENSION: ICD-10-CM

## 2023-03-21 PROCEDURE — 99214 OFFICE O/P EST MOD 30 MIN: CPT | Mod: 95 | Performed by: PHYSICIAN ASSISTANT

## 2023-03-21 RX ORDER — DEXTROAMPHETAMINE SACCHARATE, AMPHETAMINE ASPARTATE MONOHYDRATE, DEXTROAMPHETAMINE SULFATE AND AMPHETAMINE SULFATE 3.75; 3.75; 3.75; 3.75 MG/1; MG/1; MG/1; MG/1
15 CAPSULE, EXTENDED RELEASE ORAL DAILY
Qty: 30 CAPSULE | Refills: 0 | Status: SHIPPED | OUTPATIENT
Start: 2023-03-21 | End: 2023-10-12

## 2023-03-21 RX ORDER — ATORVASTATIN CALCIUM 80 MG/1
80 TABLET, FILM COATED ORAL DAILY
Qty: 90 TABLET | Refills: 1 | Status: SHIPPED | OUTPATIENT
Start: 2023-03-21 | End: 2023-09-22

## 2023-03-21 RX ORDER — SILDENAFIL CITRATE 20 MG/1
TABLET ORAL
Qty: 30 TABLET | Refills: 11 | Status: SHIPPED | OUTPATIENT
Start: 2023-03-21 | End: 2024-05-28

## 2023-03-21 RX ORDER — LOSARTAN POTASSIUM 25 MG/1
25 TABLET ORAL DAILY
Qty: 90 TABLET | Refills: 1 | Status: SHIPPED | OUTPATIENT
Start: 2023-03-21 | End: 2023-09-19

## 2023-03-21 NOTE — PROGRESS NOTES
Harjit is a 57 year old who is being evaluated via a billable telephone visit.      What phone number would you like to be contacted at? [phone]  How would you like to obtain your AVS? Jose    Distant Location (provider location):  On-site        Subjective   Harjit is a 57 year old accompanied by his  presenting for the following health issues:  No chief complaint on file.      HPI     Medication Followup of Sildenafil    Taking Medication as prescribed: yes    Side Effects:  None    Medication Helping Symptoms:  yes    Recheck of his htn.  No chest pain/sob/palpitations/dizziness/ha's    Work up for adhd is suspicious for it. fhx of adhd. Reviewed psych testing.   No depression currently. No profound anxiety.    Review of Systems   Constitutional, HEENT, cardiovascular, pulmonary, GI, , musculoskeletal, neuro, skin, endocrine and psych systems are negative, except as otherwise noted.      Objective           Vitals:  No vitals were obtained today due to virtual visit.    Physical Exam   healthy, alert and no distress  PSYCH: Alert and oriented times 3; coherent speech, normal   rate and volume, able to articulate logical thoughts, able   to abstract reason, no tangential thoughts, no hallucinations   or delusions  His affect is normal  RESP: No cough, no audible wheezing, able to talk in full sentences  Remainder of exam unable to be completed due to telephone visits    Harjit was seen today for recheck medication.    Diagnoses and all orders for this visit:    Attention deficit hyperactivity disorder (ADHD), predominantly inattentive type  -     amphetamine-dextroamphetamine (ADDERALL XR) 15 MG 24 hr capsule; Take 1 capsule (15 mg) by mouth daily    Other male erectile dysfunction  -     sildenafil (REVATIO) 20 MG tablet; 2-5 tabs 1/2 to 4 hours prior to relations    Benign essential hypertension  -     losartan (COZAAR) 25 MG tablet; Take 1 tablet (25 mg) by mouth daily    Coronary artery calcification seen  on CAT scan  -     atorvastatin (LIPITOR) 80 MG tablet; Take 1 tablet (80 mg) by mouth daily      Start adderall. Recheck in 1 mos.  Behavioral modification discussed.         Phone call duration: 15 minutes

## 2023-04-24 ENCOUNTER — TELEPHONE (OUTPATIENT)
Dept: FAMILY MEDICINE | Facility: CLINIC | Age: 58
End: 2023-04-24

## 2023-04-24 NOTE — TELEPHONE ENCOUNTER
Reason for Call:  Other     Detailed comments:   Patient calling his wife insurance put that [facility] was her clinic but she goes to [facility] and needs you to do referral saying she not a patient here.  Please call Harjit about this.  Phone Number Patient can be reached at: Home number on file [phone] (home)    Best Time:  Any     Can we leave a detailed message on this number? YES    Call taken on 4/24/2023 at 4:34 PM by Sherlyn Gonzalez

## 2023-09-19 DIAGNOSIS — I10 BENIGN ESSENTIAL HYPERTENSION: ICD-10-CM

## 2023-09-19 RX ORDER — LOSARTAN POTASSIUM 25 MG/1
25 TABLET ORAL DAILY
Qty: 90 TABLET | Refills: 1 | Status: SHIPPED | OUTPATIENT
Start: 2023-09-19 | End: 2023-10-12

## 2023-10-12 ENCOUNTER — OFFICE VISIT (OUTPATIENT)
Dept: FAMILY MEDICINE | Facility: CLINIC | Age: 58
End: 2023-10-12
Payer: COMMERCIAL

## 2023-10-12 VITALS
BODY MASS INDEX: 36.11 KG/M2 | SYSTOLIC BLOOD PRESSURE: 144 MMHG | HEART RATE: 72 BPM | TEMPERATURE: 96.9 F | HEIGHT: 69 IN | WEIGHT: 243.8 LBS | DIASTOLIC BLOOD PRESSURE: 94 MMHG | RESPIRATION RATE: 20 BRPM | OXYGEN SATURATION: 99 %

## 2023-10-12 DIAGNOSIS — Z12.5 SCREENING FOR PROSTATE CANCER: ICD-10-CM

## 2023-10-12 DIAGNOSIS — Z98.61 PERCUTANEOUS TRANSLUMINAL CORONARY ANGIOPLASTY STATUS: ICD-10-CM

## 2023-10-12 DIAGNOSIS — R07.89 ATYPICAL CHEST PAIN: ICD-10-CM

## 2023-10-12 DIAGNOSIS — I10 BENIGN ESSENTIAL HYPERTENSION: ICD-10-CM

## 2023-10-12 DIAGNOSIS — Z00.00 ROUTINE GENERAL MEDICAL EXAMINATION AT A HEALTH CARE FACILITY: Primary | ICD-10-CM

## 2023-10-12 DIAGNOSIS — E78.5 HYPERLIPIDEMIA LDL GOAL <70: ICD-10-CM

## 2023-10-12 DIAGNOSIS — F90.0 ATTENTION DEFICIT HYPERACTIVITY DISORDER (ADHD), PREDOMINANTLY INATTENTIVE TYPE: ICD-10-CM

## 2023-10-12 DIAGNOSIS — I25.10 CORONARY ARTERY CALCIFICATION SEEN ON CAT SCAN: ICD-10-CM

## 2023-10-12 PROCEDURE — G0103 PSA SCREENING: HCPCS | Performed by: PHYSICIAN ASSISTANT

## 2023-10-12 PROCEDURE — 80048 BASIC METABOLIC PNL TOTAL CA: CPT | Performed by: PHYSICIAN ASSISTANT

## 2023-10-12 PROCEDURE — 36415 COLL VENOUS BLD VENIPUNCTURE: CPT | Performed by: PHYSICIAN ASSISTANT

## 2023-10-12 PROCEDURE — 99396 PREV VISIT EST AGE 40-64: CPT | Performed by: PHYSICIAN ASSISTANT

## 2023-10-12 PROCEDURE — 80061 LIPID PANEL: CPT | Performed by: PHYSICIAN ASSISTANT

## 2023-10-12 PROCEDURE — 99212 OFFICE O/P EST SF 10 MIN: CPT | Mod: 25 | Performed by: PHYSICIAN ASSISTANT

## 2023-10-12 RX ORDER — METOPROLOL SUCCINATE 100 MG/1
100 TABLET, EXTENDED RELEASE ORAL DAILY
Qty: 90 TABLET | Refills: 3 | Status: SHIPPED | OUTPATIENT
Start: 2023-10-12 | End: 2024-04-08

## 2023-10-12 RX ORDER — DEXTROAMPHETAMINE SACCHARATE, AMPHETAMINE ASPARTATE MONOHYDRATE, DEXTROAMPHETAMINE SULFATE AND AMPHETAMINE SULFATE 3.75; 3.75; 3.75; 3.75 MG/1; MG/1; MG/1; MG/1
15 CAPSULE, EXTENDED RELEASE ORAL DAILY
Qty: 30 CAPSULE | Refills: 0 | Status: SHIPPED | OUTPATIENT
Start: 2023-10-12 | End: 2023-11-20

## 2023-10-12 RX ORDER — LOSARTAN POTASSIUM 50 MG/1
50 TABLET ORAL DAILY
Qty: 90 TABLET | Refills: 0 | Status: SHIPPED | OUTPATIENT
Start: 2023-10-12 | End: 2024-01-16

## 2023-10-12 RX ORDER — ATORVASTATIN CALCIUM 80 MG/1
80 TABLET, FILM COATED ORAL DAILY
Qty: 30 TABLET | Refills: 0 | Status: SHIPPED | OUTPATIENT
Start: 2023-10-12 | End: 2023-11-15

## 2023-10-12 ASSESSMENT — ENCOUNTER SYMPTOMS
HEMATOCHEZIA: 0
JOINT SWELLING: 0
ARTHRALGIAS: 0
SORE THROAT: 0
NAUSEA: 0
FEVER: 0
PARESTHESIAS: 0
WEAKNESS: 0
EYE PAIN: 0
SHORTNESS OF BREATH: 1
MYALGIAS: 0
DYSURIA: 0
PALPITATIONS: 0
COUGH: 0
ABDOMINAL PAIN: 0
DIARRHEA: 0
HEADACHES: 0
HEARTBURN: 0
FREQUENCY: 0
CHILLS: 0
NERVOUS/ANXIOUS: 0
DIZZINESS: 0
HEMATURIA: 0

## 2023-10-12 ASSESSMENT — PAIN SCALES - GENERAL: PAINLEVEL: NO PAIN (0)

## 2023-10-12 NOTE — PROGRESS NOTES
SUBJECTIVE:   CC: Harjit is an 58 year old who presents for preventative health visit.       10/12/2023     9:58 AM   Additional Questions   Roomed by Joanna Roy CMA   Accompanied by None         10/12/2023     9:58 AM   Patient Reported Additional Medications   Patient reports taking the following new medications none       Healthy Habits:     Getting at least 3 servings of Calcium per day:  Yes    Bi-annual eye exam:  Yes    Dental care twice a year:  Yes    Sleep apnea or symptoms of sleep apnea:  Sleep apnea    Diet:  Other    Frequency of exercise:  4-5 days/week    Duration of exercise:  15-30 minutes    Taking medications regularly:  Yes    Medication side effects:  None    Additional concerns today:  No        Recheck of htn  2 yrs removed from having multiple stents placed. Some nocturnal and occasional exertional chest pain   Recheck of adhd   Adderall worked well to control his adhd symptoms.     Social History     Tobacco Use    Smoking status: Former     Packs/day: 1     Types: Cigarettes    Smokeless tobacco: Current     Last attempt to quit: 9/1/2018   Substance Use Topics    Alcohol use: Yes     Comment: occ -              10/12/2023    10:08 AM   Alcohol Use   Prescreen: >3 drinks/day or >7 drinks/week? Yes   AUDIT SCORE  8       Last PSA:   PSA   Date Value Ref Range Status   05/01/2019 0.68 0 - 4 ug/L Final     Comment:     Assay Method:  Chemiluminescence using Siemens Vista analyzer     Prostate Specific Antigen Screen   Date Value Ref Range Status   10/06/2022 0.92 0.00 - 4.00 ug/L Final       Reviewed orders with patient. Reviewed health maintenance and updated orders accordingly - Yes  Lab work is in process  Labs reviewed in EPIC  BP Readings from Last 3 Encounters:   10/12/23 (!) 144/94   11/23/22 137/84   10/06/22 135/87    Wt Readings from Last 3 Encounters:   10/12/23 110.6 kg (243 lb 12.8 oz)   11/23/22 107.7 kg (237 lb 6.4 oz)   10/06/22 107.5 kg (237 lb)                  Patient  Active Problem List   Diagnosis    Hyperlipidemia LDL goal <160    NAYELY (generalized anxiety disorder)    Tobacco use disorder    BARRINGTON (obstructive sleep apnea)    Obesity (BMI 35.0-39.9) with comorbidity (H)    Unstable angina (H)    Percutaneous transluminal coronary angioplasty status     Past Surgical History:   Procedure Laterality Date    COLONOSCOPY N/A 1/4/2018    Procedure: COMBINED COLONOSCOPY, SINGLE OR MULTIPLE BIOPSY/POLYPECTOMY BY BIOPSY;;  Surgeon: Leeroy Jacobsen MD;  [facility]: [facility]    COLONOSCOPY WITH CO2 INSUFFLATION N/A 1/4/2018    Procedure: COLONOSCOPY WITH CO2 INSUFFLATION;  COLON-SCREENING / LIMON ;  Surgeon: Leeroy Jacobsen MD;  Location: [facility]    CV CORONARY ANGIOGRAM  11/5/2021    Procedure: CV CORONARY ANGIOGRAM;  Surgeon: Dalton Oliva MD;  [facility]    CV CORONARY LITHOTRIPSY PCI N/A 10/13/2021    Procedure: CV Coronary Lithotripsy PCI;  Surgeon: Miki Zaragoza MD;  [facility]    CV HEART CATHETERIZATION WITH POSSIBLE INTERVENTION N/A 10/13/2021    Procedure: Heart Catheterization with Possible Intervention;  Surgeon: Miki Zaragoza MD;  [facility]    CV INTRAVASULAR ULTRASOUND N/A 11/5/2021    Procedure: Intravascular Ultrasound;  Surgeon: Dalton Oliva MD;  [facility]    CV PCI ANGIOPLASTY N/A 11/5/2021    Procedure: Percutaneous Transluminal Angioplasty;  Surgeon: Dalton Oliva MD;  [facility]    CV PCI STENT DRUG ELUTING N/A 10/13/2021    Procedure: Percutaneous Coronary Intervention Stent Drug Eluting;  Surgeon: Miki Zaragoza MD;  [facility]       Social History     Tobacco Use    Smoking status: Former     Packs/day: 1     Types: Cigarettes    Smokeless tobacco: Current     Last attempt to quit: 9/1/2018   Substance Use Topics    Alcohol use:  Yes     Comment: occ -      Family History   Problem Relation Age of Onset    Dementia Mother     Other Cancer Father         brain     Diabetes Brother          Current Outpatient Medications   Medication Sig Dispense Refill    amphetamine-dextroamphetamine (ADDERALL XR) 15 MG 24 hr capsule Take 1 capsule (15 mg) by mouth daily 30 capsule 0    aspirin (ASA) 81 MG EC tablet Take 1 tablet (81 mg) by mouth daily 90 tablet 3    atorvastatin (LIPITOR) 80 MG tablet Take 1 tablet (80 mg) by mouth daily 30 tablet 0    losartan (COZAAR) 50 MG tablet Take 1 tablet (50 mg) by mouth daily 90 tablet 0    metoprolol succinate ER (TOPROL XL) 100 MG 24 hr tablet Take 1 tablet (100 mg) by mouth daily 90 tablet 3    nitroGLYcerin (NITROSTAT) 0.4 MG sublingual tablet For chest pain place 1 tablet under the tongue every 5 minutes for 3 doses. If symptoms persist 5 minutes after 1st dose call 911. 25 tablet 11    sildenafil (REVATIO) 20 MG tablet 2-5 tabs 1/2 to 4 hours prior to relations 30 tablet 11    trimethoprim-polymyxin b (POLYTRIM) 24392-2.1 UNIT/ML-% ophthalmic solution Place 1-2 drops into both eyes every 4 hours 10 mL 1    varenicline (CHANTIX) 0.5 MG tablet Take 1 tablet (0.5 mg) by mouth 2 times daily Take one tab daily for 3 days, then take one tab twice a day for 4 days, then increase to the 1 mg dose. 11 tablet 0    varenicline (CHANTIX) 1 MG tablet Take 1 tablet (1 mg) by mouth 2 times daily 60 tablet 1    order for DME Equipment being ordered: CPAP and all necessary supplies 1 Device 0     No Known Allergies  Recent Labs   Lab Test 10/06/22  1048 11/06/21  0611 11/05/21  2047 11/05/21  1213 10/13/21  1055 10/13/21  0828 05/01/19  1044 11/30/17  0941 11/14/16  0952 11/05/15  0936   A1C  --   --   --   --  5.9*  --   --   --   --   --    LDL  --   --  90  --  69  --  119*   < > 157* 144*   HDL  --   --  53  --  44  --  44   < > 42 42   TRIG  --   --  69  --  170*  --  254*   < > 155* 197*   ALT  --   --   --  62 37  --    --   --   --   --    CR 0.94 0.80  --  0.82 0.87   < > 0.91  --  1.00 0.98   GFRESTIMATED >90 >90  --  >90 >90   < > >90  --  79 81   GFRESTBLACK  --   --   --   --   --   --  >90  --  >90   GFR Calc   >90   GFR Calc     POTASSIUM 3.8 3.8  --  4.5 4.6   < > 4.7  --  4.2 4.8   TSH  --   --   --   --   --   --   --   --   --  2.69    < > = values in this interval not displayed.        Reviewed and updated as needed this visit by clinical staff   Tobacco  Allergies  Meds              Reviewed and updated as needed this visit by Provider                 Past Medical History:   Diagnosis Date    NAYELY (generalized anxiety disorder)     Sleep apnea     cpap      Past Surgical History:   Procedure Laterality Date    COLONOSCOPY N/A 1/4/2018    Procedure: COMBINED COLONOSCOPY, SINGLE OR MULTIPLE BIOPSY/POLYPECTOMY BY BIOPSY;;  Surgeon: Leeroy Jacobsen MD;  [facility]    COLONOSCOPY WITH CO2 INSUFFLATION N/A 1/4/2018    Procedure: COLONOSCOPY WITH CO2 INSUFFLATION;  COLON-SCREENING / LIMON ;  Surgeon: Leeroy Jacobsen MD;  [facility]    CV CORONARY ANGIOGRAM  11/5/2021    Procedure: CV CORONARY ANGIOGRAM;  Surgeon: Dalton Oliva MD;  Location: [facility] CARDIAC CATH LAB    CV CORONARY LITHOTRIPSY PCI N/A 10/13/2021    Procedure: CV Coronary Lithotripsy PCI;  Surgeon: Miki Zaragoza MD;  Location: [facility] CARDIAC CATH LAB    CV HEART CATHETERIZATION WITH POSSIBLE INTERVENTION N/A 10/13/2021    Procedure: Heart Catheterization with Possible Intervention;  Surgeon: Miki Zaragoza MD;  Location: [facility] CARDIAC CATH LAB    CV INTRAVASULAR ULTRASOUND N/A 11/5/2021    Procedure: Intravascular Ultrasound;  Surgeon: Dalton Oliva MD;  Location: [facility] CARDIAC CATH LAB    CV PCI ANGIOPLASTY N/A 11/5/2021    Procedure: Percutaneous Transluminal Angioplasty;  Surgeon: Dalton Oliva MD;  Location: [facility] CARDIAC  "CATH LAB    CV PCI STENT DRUG ELUTING N/A 10/13/2021    Procedure: Percutaneous Coronary Intervention Stent Drug Eluting;  Surgeon: Miki Zaragoza MD;  [facility]       Review of Systems   Constitutional:  Negative for chills and fever.   HENT:  Negative for congestion, ear pain, hearing loss and sore throat.    Eyes:  Negative for pain and visual disturbance.   Respiratory:  Positive for shortness of breath. Negative for cough.    Cardiovascular:  Positive for chest pain. Negative for palpitations and peripheral edema.   Gastrointestinal:  Negative for abdominal pain, diarrhea, heartburn, hematochezia and nausea.   Genitourinary:  Negative for dysuria, frequency, genital sores, hematuria, impotence, penile discharge and urgency.   Musculoskeletal:  Negative for arthralgias, joint swelling and myalgias.   Skin:  Negative for rash.   Neurological:  Negative for dizziness, weakness, headaches and paresthesias.   Psychiatric/Behavioral:  Negative for mood changes. The patient is not nervous/anxious.      CONSTITUTIONAL: NEGATIVE for fever, chills, change in weight  INTEGUMENTARY/SKIN: NEGATIVE for worrisome rashes, moles or lesions  EYES: NEGATIVE for vision changes or irritation  ENT: NEGATIVE for ear, mouth and throat problems  RESP: NEGATIVE for significant cough or SOB  CV: NEGATIVE for chest pain, palpitations or peripheral edema  GI: NEGATIVE for nausea, abdominal pain, heartburn, or change in bowel habits   male: negative for dysuria, hematuria, decreased urinary stream, erectile dysfunction, urethral discharge  MUSCULOSKELETAL: NEGATIVE for significant arthralgias or myalgia  NEURO: NEGATIVE for weakness, dizziness or paresthesias  PSYCHIATRIC: NEGATIVE for changes in mood or affect    OBJECTIVE:   BP (!) 144/94   Pulse 72   Temp 96.9  F (36.1  C) (Temporal)   Resp 20   Ht 1.759 m (5' 9.25\")   Wt 110.6 kg (243 lb 12.8 oz)   SpO2 99%   BMI 35.74 kg/m  "     Physical Exam  GENERAL: healthy, alert and no distress  EYES: Eyes grossly normal to inspection, PERRL and conjunctivae and sclerae normal  HENT: ear canals and TM's normal, nose and mouth without ulcers or lesions  NECK: no adenopathy, no asymmetry, masses, or scars and thyroid normal to palpation  RESP: lungs clear to auscultation - no rales, rhonchi or wheezes  CV: regular rate and rhythm, normal S1 S2, no S3 or S4, no murmur, click or rub, no peripheral edema and peripheral pulses strong  ABDOMEN: soft, nontender, no hepatosplenomegaly, no masses and bowel sounds normal  MS: no gross musculoskeletal defects noted, no edema  SKIN: no suspicious lesions or rashes  NEURO: Normal strength and tone, mentation intact and speech normal  PSYCH: mentation appears normal, affect normal/bright    Diagnostic Test Results:  Labs reviewed in Epic    ASSESSMENT/PLAN:       ICD-10-CM    1. Routine general medical examination at a health care facility  Z00.00       2. Benign essential hypertension  I10 losartan (COZAAR) 50 MG tablet     Basic metabolic panel  (Ca, Cl, CO2, Creat, Gluc, K, Na, BUN)      3. Attention deficit hyperactivity disorder (ADHD), predominantly inattentive type  F90.0 amphetamine-dextroamphetamine (ADDERALL XR) 15 MG 24 hr capsule      4. Coronary artery calcification seen on CAT scan  I25.10 atorvastatin (LIPITOR) 80 MG tablet     Lipid panel reflex to direct LDL Fasting      5. Atypical chest pain  R07.89 CTA Angiogram heart      6. Percutaneous transluminal coronary angioplasty status  Z98.61 metoprolol succinate ER (TOPROL XL) 100 MG 24 hr tablet      7. Hyperlipidemia LDL goal <70  E78.5 Lipid panel reflex to direct LDL Fasting      8. Screening for prostate cancer  Z12.5 PSA, screen        Inc losartan to 50 mg daily.  Lower salt/fat diet  Exercise.  Recheck in 4-6 mos   Patient has been advised of split billing requirements and indicates understanding: Yes      COUNSELING:   Reviewed preventive  health counseling, as reflected in patient instructions       Regular exercise       Healthy diet/nutrition        He reports that he has quit smoking. His smoking use included cigarettes. He smoked an average of 1 pack per day. He uses smokeless tobacco.            RYAN Fu [facility]

## 2023-10-13 LAB
ANION GAP SERPL CALCULATED.3IONS-SCNC: 11 MMOL/L (ref 7–15)
BUN SERPL-MCNC: 12 MG/DL (ref 6–20)
CALCIUM SERPL-MCNC: 9.8 MG/DL (ref 8.6–10)
CHLORIDE SERPL-SCNC: 101 MMOL/L (ref 98–107)
CHOLEST SERPL-MCNC: 144 MG/DL
CREAT SERPL-MCNC: 0.95 MG/DL (ref 0.67–1.17)
DEPRECATED HCO3 PLAS-SCNC: 27 MMOL/L (ref 22–29)
EGFRCR SERPLBLD CKD-EPI 2021: >90 ML/MIN/1.73M2
GLUCOSE SERPL-MCNC: 99 MG/DL (ref 70–99)
HDLC SERPL-MCNC: 48 MG/DL
LDLC SERPL CALC-MCNC: 74 MG/DL
NONHDLC SERPL-MCNC: 96 MG/DL
POTASSIUM SERPL-SCNC: 5 MMOL/L (ref 3.4–5.3)
PSA SERPL DL<=0.01 NG/ML-MCNC: 0.6 NG/ML (ref 0–3.5)
SODIUM SERPL-SCNC: 139 MMOL/L (ref 135–145)
TRIGL SERPL-MCNC: 112 MG/DL

## 2023-11-15 DIAGNOSIS — I25.10 CORONARY ARTERY CALCIFICATION SEEN ON CAT SCAN: ICD-10-CM

## 2023-11-15 RX ORDER — ATORVASTATIN CALCIUM 80 MG/1
80 TABLET, FILM COATED ORAL DAILY
Qty: 30 TABLET | Refills: 1 | Status: SHIPPED | OUTPATIENT
Start: 2023-11-15 | End: 2024-01-16

## 2023-11-20 DIAGNOSIS — F90.0 ATTENTION DEFICIT HYPERACTIVITY DISORDER (ADHD), PREDOMINANTLY INATTENTIVE TYPE: ICD-10-CM

## 2023-11-21 RX ORDER — DEXTROAMPHETAMINE SACCHARATE, AMPHETAMINE ASPARTATE MONOHYDRATE, DEXTROAMPHETAMINE SULFATE AND AMPHETAMINE SULFATE 3.75; 3.75; 3.75; 3.75 MG/1; MG/1; MG/1; MG/1
15 CAPSULE, EXTENDED RELEASE ORAL DAILY
Qty: 30 CAPSULE | Refills: 0 | Status: SHIPPED | OUTPATIENT
Start: 2023-11-21 | End: 2023-12-27

## 2024-01-16 DIAGNOSIS — I25.10 CORONARY ARTERY CALCIFICATION SEEN ON CAT SCAN: ICD-10-CM

## 2024-01-16 RX ORDER — ATORVASTATIN CALCIUM 80 MG/1
80 TABLET, FILM COATED ORAL DAILY
Qty: 90 TABLET | Refills: 1 | Status: SHIPPED | OUTPATIENT
Start: 2024-01-16 | End: 2024-09-11

## 2024-03-25 ENCOUNTER — TELEPHONE (OUTPATIENT)
Dept: FAMILY MEDICINE | Facility: CLINIC | Age: 59
End: 2024-03-25
Payer: COMMERCIAL

## 2024-03-25 DIAGNOSIS — F90.0 ATTENTION DEFICIT HYPERACTIVITY DISORDER (ADHD), PREDOMINANTLY INATTENTIVE TYPE: ICD-10-CM

## 2024-03-25 NOTE — TELEPHONE ENCOUNTER
Medication Question or Refill    Contacts         Type Contact Phone/Fax    03/25/2024 09:36 AM CDT Phone (Incoming) Harjit Medina (Self) [phone] (M)            What medication are you calling about (include dose and sig)?:amphetamine-dextroamphetamine (ADDERALL XR) 15 MG 24 hr capsule    Preferred Pharmacy:   [id number] - [place], [place] - [address]  [address])  [address]  [place] [address]  Phone: [phone] Fax: [phone]    [facility] - [place], [place] - [address] [place] [address]  Phone: [phone] Fax: [phone]      Controlled Substance Agreement on file:   CSA -- Patient Level:    CSA: None found at the patient level.       Who prescribed the medication?: Aleyda    Do you need a refill? Yes    When did you use the medication last? 03/25/24    Patient offered an appointment? No    Do you have any questions or concerns?  Yes: only have 7 day left of medication      Could we send this information to you in [place] or would you prefer to receive a phone call?:   Patient would prefer a phone call   Okay to leave a detailed message?: Yes at Cell number on file:    Telephone Information:   Mobile [phone]

## 2024-03-27 RX ORDER — DEXTROAMPHETAMINE SACCHARATE, AMPHETAMINE ASPARTATE MONOHYDRATE, DEXTROAMPHETAMINE SULFATE AND AMPHETAMINE SULFATE 3.75; 3.75; 3.75; 3.75 MG/1; MG/1; MG/1; MG/1
15 CAPSULE, EXTENDED RELEASE ORAL DAILY
Qty: 30 CAPSULE | Refills: 0 | Status: SHIPPED | OUTPATIENT
Start: 2024-03-27 | End: 2024-05-03

## 2024-03-27 NOTE — TELEPHONE ENCOUNTER
RN spoke with patient to update. No further questions.     Jenelle Laird RN on 3/27/2024 at 2:59 PM

## 2024-03-27 NOTE — TELEPHONE ENCOUNTER
Schedule patient appointment and Patient wanting to follow up on this refill 3 days left of current.   Carolina D[place].  Thank you!

## 2024-04-08 ENCOUNTER — OFFICE VISIT (OUTPATIENT)
Dept: FAMILY MEDICINE | Facility: CLINIC | Age: 59
End: 2024-04-08
Payer: COMMERCIAL

## 2024-04-08 VITALS
HEIGHT: 69 IN | WEIGHT: 243.8 LBS | OXYGEN SATURATION: 99 % | RESPIRATION RATE: 20 BRPM | TEMPERATURE: 97.4 F | HEART RATE: 71 BPM | SYSTOLIC BLOOD PRESSURE: 132 MMHG | BODY MASS INDEX: 36.11 KG/M2 | DIASTOLIC BLOOD PRESSURE: 80 MMHG

## 2024-04-08 DIAGNOSIS — Z98.61 PERCUTANEOUS TRANSLUMINAL CORONARY ANGIOPLASTY STATUS: ICD-10-CM

## 2024-04-08 DIAGNOSIS — I10 BENIGN ESSENTIAL HYPERTENSION: Primary | ICD-10-CM

## 2024-04-08 DIAGNOSIS — R10.11 RUQ ABDOMINAL PAIN: ICD-10-CM

## 2024-04-08 DIAGNOSIS — F90.0 ATTENTION DEFICIT HYPERACTIVITY DISORDER (ADHD), PREDOMINANTLY INATTENTIVE TYPE: ICD-10-CM

## 2024-04-08 PROCEDURE — 99213 OFFICE O/P EST LOW 20 MIN: CPT | Performed by: PHYSICIAN ASSISTANT

## 2024-04-08 RX ORDER — LOSARTAN POTASSIUM 50 MG/1
50 TABLET ORAL DAILY
Qty: 90 TABLET | Refills: 1 | Status: SHIPPED | OUTPATIENT
Start: 2024-04-08

## 2024-04-08 RX ORDER — METOPROLOL SUCCINATE 100 MG/1
100 TABLET, EXTENDED RELEASE ORAL DAILY
Qty: 90 TABLET | Refills: 3 | Status: SHIPPED | OUTPATIENT
Start: 2024-04-08

## 2024-04-08 ASSESSMENT — PAIN SCALES - GENERAL: PAINLEVEL: NO PAIN (0)

## 2024-04-08 NOTE — PROGRESS NOTES
"    Subjective   Harjit is a 58 year old, presenting for the following health issues:  Recheck Medication        4/8/2024    11:48 AM   Additional Questions   Roomed by Joanna Roy CMA   Accompanied by None         4/8/2024    11:48 AM   Patient Reported Additional Medications   Patient reports taking the following new medications none     History of Present Illness       Reason for visit:  Follow up on meds    He eats 0-1 servings of fruits and vegetables daily.He consumes 2 sweetened beverage(s) daily.He exercises with enough effort to increase his heart rate 30 to 60 minutes per day.  He exercises with enough effort to increase his heart rate 6 days per week.   He is taking medications regularly.     Recheck of htn   No chest pain/sob/palpitations/dizziness/ha's    Adhd is doing well. Adderall is working well on his target symptoms.     Recent issue with RUQ abd pain. Seen at [facility] and they stated he has some gallstones.     Review of Systems  Constitutional, neuro, ENT, endocrine, pulmonary, cardiac, gastrointestinal, genitourinary, musculoskeletal, integument and psychiatric systems are negative, except as otherwise noted.      Objective    /80   Pulse 71   Temp 97.4  F (36.3  C) (Temporal)   Resp 20   Ht 1.759 m (5' 9.25\")   Wt 110.6 kg (243 lb 12.8 oz)   SpO2 99%   BMI 35.74 kg/m    Body mass index is 35.74 kg/m .  Physical Exam   Eye exam - right eye normal lid, conjunctiva, cornea, pupil and fundus, left eye normal lid, conjunctiva, cornea, pupil and fundus.  Thyroid not palpable, not enlarged, no nodules detected.  CHEST:chest clear to IPPA, no tachypnea, retractions or cyanosis, and S1, S2 normal, no murmur, no gallop, rate regular.  ABDOMEN: mild tenderness in the RUQ area, without rebound, guarding, mass or organomegaly. Abdomen is soft and bowel sounds are normal.    Harjit was seen today for recheck medication.    Diagnoses and all orders for this visit:    Benign essential " hypertension  -     losartan (COZAAR) 50 MG tablet; Take 1 tablet (50 mg) by mouth daily    Attention deficit hyperactivity disorder (ADHD), predominantly inattentive type    RUQ abdominal pain  -     US Abdomen Limited; Future    Percutaneous transluminal coronary angioplasty status  -     metoprolol succinate ER (TOPROL XL) 100 MG 24 hr tablet; Take 1 tablet (100 mg) by mouth daily      Lower fat diet  Advised supportive and symptomatic treatment.  Follow up with Provider - if condition persists or worsens.   work on lifestyle modification    Signed Electronically by: Piter Maynard PA-C

## 2024-05-20 DIAGNOSIS — I10 BENIGN ESSENTIAL HYPERTENSION: ICD-10-CM

## 2024-05-20 RX ORDER — LOSARTAN POTASSIUM 50 MG/1
50 TABLET ORAL DAILY
Qty: 45 TABLET | OUTPATIENT
Start: 2024-05-20

## 2024-07-11 DIAGNOSIS — F90.0 ATTENTION DEFICIT HYPERACTIVITY DISORDER (ADHD), PREDOMINANTLY INATTENTIVE TYPE: ICD-10-CM

## 2024-07-12 RX ORDER — DEXTROAMPHETAMINE SACCHARATE, AMPHETAMINE ASPARTATE MONOHYDRATE, DEXTROAMPHETAMINE SULFATE AND AMPHETAMINE SULFATE 3.75; 3.75; 3.75; 3.75 MG/1; MG/1; MG/1; MG/1
15 CAPSULE, EXTENDED RELEASE ORAL DAILY
Qty: 30 CAPSULE | Refills: 0 | Status: SHIPPED | OUTPATIENT
Start: 2024-07-12 | End: 2024-08-14

## 2024-07-15 ENCOUNTER — NURSE TRIAGE (OUTPATIENT)
Dept: FAMILY MEDICINE | Facility: CLINIC | Age: 59
End: 2024-07-15

## 2024-07-15 NOTE — TELEPHONE ENCOUNTER
I called and spoke to patient, advised him of PCP's advice, virtual visit today.   Patient is frustrated by the costs of any type of visit, says he is in a lot of pain and wonders if he should just go to ER.   I advised that was the outcome of the previous RN's conversation.   He says he got a $2000 bill from the ER and they did not do anything.   He eventually agreed to schedule phone visit with PCP this afternoon.    Scheduled.    Rama VINCENT RN  [facility] Triage

## 2024-07-15 NOTE — TELEPHONE ENCOUNTER
"Provider's door is shut, assume is on a call or virtual visit.    I re-sent the \"urgent\" secure chat message again.    Rama VINCENT RN  [facility] Triage    "

## 2024-07-15 NOTE — TELEPHONE ENCOUNTER
No response from PCP regarding 2nd level triage (see patient in clinic or go to ER?), see triage per other RN below.    Epic secure chat message sent to PCP to draw attention to this for response.    Rama VINCENT RN  [facility] Triage

## 2024-07-15 NOTE — TELEPHONE ENCOUNTER
No response from PCP to the secure chat message.   Will attempt to huddle with provider in person now.    Rama VINCENT RN  [facility] Triage

## 2024-07-15 NOTE — TELEPHONE ENCOUNTER
"Patient calling. He is calling to follow-up on body sweats and pain on his sternal area near his gallbladder.    Patient experiencing constant pain in this area. Patient rates pain as 8/10. Patient describes that this is the same symptom that he had experienced when he was in the ER.     Patient was advised by ER doctor that he had gallstones.    Patient explains he had seen PCP to follow-up, who had advised that patient was recommended to see a specialist. Patient stating, \"I'm done with the runaround shit and I refuse to be seen by a specialist\". Patient expressed frustration about how [facility] did not share records with [facility]. Advised there are ways that records may be shared, such as consent for care everywhere.     Advised to patient that if he is experiencing severe pain, that patient should be seen in ED immediately. Patient became frustrated, expressing how he feels that he keeps getting the runaround and stating \"I will not sit in the ER for 7 hours just for you people to tell me there is nothing that you can do. I spent thousands of dollars just to be told that\".     RN emphasized with patient, however, emphasized primary care does not have adequate equipment to treat severe pain therefore patient should be seen in ED immediately to prevent further worsening of symptoms. Patient began to express further frustration with RN, stating the healthcare system is a joke and he needs answers now. RN offered patient relations number to patient, patient declined.    Advised a message may be sent to PCP to further advise, however, once again emphasized patient should be seen in ED for further assessment and treatment. Patient proceeded to promptly end call.    SAE Rivera RN  [facility]    Reason for Disposition   SEVERE abdominal pain (e.g., excruciating)    Additional Information   Negative: Passed out (i.e., fainted, collapsed and was not responding)   Negative: Shock suspected " (e.g., cold/pale/clammy skin, too weak to stand, low BP, rapid pulse)   Negative: Sounds like a life-threatening emergency to the triager   Negative: Followed an abdomen (stomach) injury   Negative: Chest pain   Negative: Pain is mainly in upper abdomen (if needed ask: 'is it mainly above the belly button?')   Negative: Abdomen bloating or swelling are main symptoms    Protocols used: Abdominal Pain - Male-A-[place]

## 2024-08-14 DIAGNOSIS — F90.0 ATTENTION DEFICIT HYPERACTIVITY DISORDER (ADHD), PREDOMINANTLY INATTENTIVE TYPE: ICD-10-CM

## 2024-08-15 RX ORDER — DEXTROAMPHETAMINE SACCHARATE, AMPHETAMINE ASPARTATE MONOHYDRATE, DEXTROAMPHETAMINE SULFATE AND AMPHETAMINE SULFATE 3.75; 3.75; 3.75; 3.75 MG/1; MG/1; MG/1; MG/1
15 CAPSULE, EXTENDED RELEASE ORAL DAILY
Qty: 30 CAPSULE | Refills: 0 | Status: SHIPPED | OUTPATIENT
Start: 2024-08-15 | End: 2024-09-03

## 2024-09-03 DIAGNOSIS — F90.0 ATTENTION DEFICIT HYPERACTIVITY DISORDER (ADHD), PREDOMINANTLY INATTENTIVE TYPE: ICD-10-CM

## 2024-09-04 RX ORDER — DEXTROAMPHETAMINE SACCHARATE, AMPHETAMINE ASPARTATE MONOHYDRATE, DEXTROAMPHETAMINE SULFATE AND AMPHETAMINE SULFATE 3.75; 3.75; 3.75; 3.75 MG/1; MG/1; MG/1; MG/1
15 CAPSULE, EXTENDED RELEASE ORAL DAILY
Qty: 30 CAPSULE | Refills: 0 | Status: SHIPPED | OUTPATIENT
Start: 2024-09-04 | End: 2024-10-14

## 2024-09-11 DIAGNOSIS — I25.10 CORONARY ARTERY CALCIFICATION SEEN ON CAT SCAN: ICD-10-CM

## 2024-09-12 RX ORDER — ATORVASTATIN CALCIUM 80 MG/1
80 TABLET, FILM COATED ORAL DAILY
Qty: 90 TABLET | Refills: 1 | Status: SHIPPED | OUTPATIENT
Start: 2024-09-12

## 2024-10-14 DIAGNOSIS — F90.0 ATTENTION DEFICIT HYPERACTIVITY DISORDER (ADHD), PREDOMINANTLY INATTENTIVE TYPE: ICD-10-CM

## 2024-10-16 RX ORDER — DEXTROAMPHETAMINE SACCHARATE, AMPHETAMINE ASPARTATE MONOHYDRATE, DEXTROAMPHETAMINE SULFATE AND AMPHETAMINE SULFATE 3.75; 3.75; 3.75; 3.75 MG/1; MG/1; MG/1; MG/1
15 CAPSULE, EXTENDED RELEASE ORAL DAILY
Qty: 30 CAPSULE | Refills: 0 | Status: SHIPPED | OUTPATIENT
Start: 2024-10-16

## 2024-10-16 NOTE — TELEPHONE ENCOUNTER
Harjit made an appt for his physical on 10/29/24. He was also informed his medication was filled and sent for a month.  [facility] Registration

## 2024-10-16 NOTE — TELEPHONE ENCOUNTER
"Pt calling. States he is now out of the prescription for Adderall.  Pt states he has been out of medication for 2 days. Pt voiced frustrations regarding this stating this happens every month. Pt reports he even called 1 week in advance for refill and it still took 1.5 weeks to get refill. Pt reports he get's very angry and irritable without the medication. Pt even said \"I am ready to kill someone\". RN questioned pt about this and said, \"you won't actually hurt anyone though, correct?\" Pt then said he is \"edgy as hell.\" RN offered patient relations and pt said \"No because [place] doesn't care. Only thing [place] cares about is supporting the [employer].\" RN listened to pt's frustrations and again offered patient relations. Pt again declined to take this information. RN informed pt he is likely due for a medication follow up as his last office visit was 4/8/24. Pt mentioned he may be switching to another provider. RN informed pt that that is his decision.    Routing as high priority to Provider. Please address today as soon as possible. RN also sent Epic chat to Provider.     Please notify pt once script has been sent. Thanks!    Birgit Lomax RN  [facility]    "

## 2024-10-29 ENCOUNTER — OFFICE VISIT (OUTPATIENT)
Dept: FAMILY MEDICINE | Facility: CLINIC | Age: 59
End: 2024-10-29
Payer: COMMERCIAL

## 2024-10-29 VITALS
DIASTOLIC BLOOD PRESSURE: 62 MMHG | OXYGEN SATURATION: 98 % | RESPIRATION RATE: 20 BRPM | SYSTOLIC BLOOD PRESSURE: 112 MMHG | HEIGHT: 69 IN | HEART RATE: 75 BPM | WEIGHT: 232.2 LBS | BODY MASS INDEX: 34.39 KG/M2 | TEMPERATURE: 98 F

## 2024-10-29 DIAGNOSIS — F41.1 GAD (GENERALIZED ANXIETY DISORDER): ICD-10-CM

## 2024-10-29 DIAGNOSIS — E78.5 HYPERLIPIDEMIA LDL GOAL <70: ICD-10-CM

## 2024-10-29 DIAGNOSIS — Z12.5 SCREENING FOR PROSTATE CANCER: ICD-10-CM

## 2024-10-29 DIAGNOSIS — F17.200 TOBACCO USE DISORDER: ICD-10-CM

## 2024-10-29 DIAGNOSIS — I10 BENIGN ESSENTIAL HYPERTENSION: ICD-10-CM

## 2024-10-29 DIAGNOSIS — F90.0 ATTENTION DEFICIT HYPERACTIVITY DISORDER (ADHD), PREDOMINANTLY INATTENTIVE TYPE: ICD-10-CM

## 2024-10-29 DIAGNOSIS — Z00.00 ROUTINE GENERAL MEDICAL EXAMINATION AT A HEALTH CARE FACILITY: Primary | ICD-10-CM

## 2024-10-29 PROCEDURE — 36415 COLL VENOUS BLD VENIPUNCTURE: CPT | Performed by: PHYSICIAN ASSISTANT

## 2024-10-29 PROCEDURE — G0103 PSA SCREENING: HCPCS | Performed by: PHYSICIAN ASSISTANT

## 2024-10-29 PROCEDURE — 99396 PREV VISIT EST AGE 40-64: CPT | Performed by: PHYSICIAN ASSISTANT

## 2024-10-29 PROCEDURE — 80048 BASIC METABOLIC PNL TOTAL CA: CPT | Performed by: PHYSICIAN ASSISTANT

## 2024-10-29 PROCEDURE — 80061 LIPID PANEL: CPT | Performed by: PHYSICIAN ASSISTANT

## 2024-10-29 PROCEDURE — 99212 OFFICE O/P EST SF 10 MIN: CPT | Mod: 25 | Performed by: PHYSICIAN ASSISTANT

## 2024-10-29 RX ORDER — ESCITALOPRAM OXALATE 10 MG/1
TABLET ORAL
Qty: 45 TABLET | Refills: 1 | Status: SHIPPED | OUTPATIENT
Start: 2024-10-29

## 2024-10-29 SDOH — HEALTH STABILITY: PHYSICAL HEALTH: ON AVERAGE, HOW MANY DAYS PER WEEK DO YOU ENGAGE IN MODERATE TO STRENUOUS EXERCISE (LIKE A BRISK WALK)?: 5 DAYS

## 2024-10-29 SDOH — HEALTH STABILITY: PHYSICAL HEALTH: ON AVERAGE, HOW MANY MINUTES DO YOU ENGAGE IN EXERCISE AT THIS LEVEL?: 30 MIN

## 2024-10-29 ASSESSMENT — SOCIAL DETERMINANTS OF HEALTH (SDOH): HOW OFTEN DO YOU GET TOGETHER WITH FRIENDS OR RELATIVES?: TWICE A WEEK

## 2024-10-29 NOTE — PROGRESS NOTES
Preventive Care Visit  [facility] ARASELI Maynard PA-C, Family Medicine  Oct 29, 2024        Prisca Lombardi is a 59 year old, presenting for the following:  Physical and Health Maintenance (Patient is fasting/Patient declines vaccines today)        10/29/2024     9:48 AM   Additional Questions   Roomed by Joanna Roy CMA   Accompanied by none         10/29/2024     9:48 AM   Patient Reported Additional Medications   Patient reports taking the following new medications none          HPI  Adhd is doing well. Anxiety and irritable. No depression   Blood pressure looks good.   History of cad.  No chest pain/sob/palpitations/dizziness/ha's    Health Care Directive  Patient does not have a Health Care Directive: Discussed advance care planning with patient; however, patient declined at this time.      10/29/2024   General Health   How would you rate your overall physical health? Good   Feel stress (tense, anxious, or unable to sleep) To some extent      (!) STRESS CONCERN      10/29/2024   Nutrition   Three or more servings of calcium each day? Yes   Diet: I don't know   How many servings of fruit and vegetables per day? (!) 0-1   How many sweetened beverages each day? 0-1            10/29/2024   Exercise   Days per week of moderate/strenous exercise 5 days   Average minutes spent exercising at this level 30 min            10/29/2024   Social Factors   Frequency of gathering with friends or relatives Twice a week   Worry food won't last until get money to buy more No   Food not last or not have enough money for food? No   Do you have housing? (Housing is defined as stable permanent housing and does not include staying ouside in a car, in a tent, in an abandoned building, in an overnight shelter, or couch-surfing.) Yes   Are you worried about losing your housing? No   Lack of transportation? No   Unable to get utilities (heat,electricity)? No            10/29/2024   Fall Risk   Fallen 2 or  more times in the past year? No    Trouble with walking or balance? No        Patient-reported          10/29/2024   Dental   Dentist two times every year? Yes            10/29/2024   TB Screening   Were you born outside of the [place]? No              Today's PHQ-2 Score:       4/8/2024    11:27 AM   PHQ-2 ( 1999 [employer])   Q1: Little interest or pleasure in doing things 0    Q2: Feeling down, depressed or hopeless 0    PHQ-2 Score 0   Q1: Little interest or pleasure in doing things Not at all   Q2: Feeling down, depressed or hopeless Not at all   PHQ-2 Score 0       Patient-reported         10/29/2024   Substance Use   Alcohol more than 3/day or more than 7/wk Yes   How often do you have a drink containing alcohol 2 to 3 times a week   How many alcohol drinks on typical day 3 or 4   How often do you have 5+ drinks at one occasion Weekly   Audit 2/3 Score 4   How often not able to stop drinking once started Never   How often failed to do what normally expected Never   How often needed first drink in am after a heavy drinking session Never   How often feeling of guilt or remorse after drinking Never   How often unable to remember what happened the night before Never   Have you or someone else been injured because of your drinking No   Has anyone been concerned or suggested you cut down on drinking No   TOTAL SCORE - AUDIT 7   Do you use any other substances recreationally? (!) DECLINE        Social History     Tobacco Use    Smoking status: Former     Current packs/day: 1.00     Types: Cigarettes    Smokeless tobacco: Current     Last attempt to quit: 9/1/2018   Vaping Use    Vaping status: Never Used   Substance Use Topics    Alcohol use: Yes     Comment: occ -     Drug use: No             10/29/2024   One time HIV Screening   Previous HIV test? No          10/29/2024   STI Screening   New sexual partner(s) since last STI/HIV test? No      Last PSA:   PSA   Date Value Ref Range Status   05/01/2019 0.68 0 - 4 ug/L Final      Comment:     Assay Method:  Chemiluminescence using Siemens Vista analyzer     Prostate Specific Antigen Screen   Date Value Ref Range Status   10/12/2023 0.60 0.00 - 3.50 ng/mL Final   10/06/2022 0.92 0.00 - 4.00 ug/L Final     ASCVD Risk   The 10-year ASCVD risk score (Coleen GARCIAS, et al., 2019) is: 5.5%    Values used to calculate the score:      Age: 59 years      Sex: Male      Is Non- : No      Diabetic: No      Tobacco smoker: No      Systolic Blood Pressure: 112 mmHg      Is BP treated: Yes      HDL Cholesterol: 48 mg/dL      Total Cholesterol: 144 mg/dL    Fracture Risk Assessment Tool  Link to Frax Calculator  Use the information below to complete the Frax calculator  : 1965  Sex: male  Weight (kg): 105.3 kg (actual weight)  Height (cm): 175.9 cm  Previous Fragility Fracture:  No  History of parent with fractured hip:  No  Current Smoking:  No  Patient has been on glucocorticoids for more than 3 months (5mg/day or more): No  Rheumatoid Arthritis on Problem List:  No  Secondary Osteoporosis on Problem List:  No  Consumes 3 or more units of alcohol per day: No  Femoral Neck BMD (g/cm2)           Reviewed and updated as needed this visit by Provider                    Past Medical History:   Diagnosis Date    NAYELY (generalized anxiety disorder)     Sleep apnea     cpap     Past Surgical History:   Procedure Laterality Date    COLONOSCOPY N/A 2018    Procedure: COMBINED COLONOSCOPY, SINGLE OR MULTIPLE BIOPSY/POLYPECTOMY BY BIOPSY;;  Surgeon: Leeroy Jacobsen MD;  Location: [facility] OR    COLONOSCOPY WITH CO2 INSUFFLATION N/A 2018    Procedure: COLONOSCOPY WITH CO2 INSUFFLATION;  COLON-SCREENING / LIMON ;  Surgeon: Leeroy Jacobsen MD;  Location: [facility] OR    CV CORONARY ANGIOGRAM  2021    Procedure: CV CORONARY ANGIOGRAM;  Surgeon: Dalton Oliva MD;  Location:  HEART CARDIAC CATH LAB    CV CORONARY LITHOTRIPSY PCI N/A 10/13/2021     Procedure: CV Coronary Lithotripsy PCI;  Surgeon: Miki Zaragoza MD;  Location: [facility] CARDIAC CATH LAB    CV HEART CATHETERIZATION WITH POSSIBLE INTERVENTION N/A 10/13/2021    Procedure: Heart Catheterization with Possible Intervention;  Surgeon: Miki Zaragoza MD;  Location: [facility] CARDIAC CATH LAB    CV INTRAVASULAR ULTRASOUND N/A 11/5/2021    Procedure: Intravascular Ultrasound;  Surgeon: Dalton Oliva MD;  Location: [facility] CARDIAC CATH LAB    CV PCI ANGIOPLASTY N/A 11/5/2021    Procedure: Percutaneous Transluminal Angioplasty;  Surgeon: Dalton Oliva MD;  Location: [facility] CARDIAC CATH LAB    CV PCI STENT DRUG ELUTING N/A 10/13/2021    Procedure: Percutaneous Coronary Intervention Stent Drug Eluting;  Surgeon: Miki Zaragoza MD;  Location: [facility] CARDIAC CATH LAB     BP Readings from Last 3 Encounters:   10/29/24 112/62   04/08/24 132/80   10/12/23 (!) 144/94    Wt Readings from Last 3 Encounters:   10/29/24 105.3 kg (232 lb 3.2 oz)   04/08/24 110.6 kg (243 lb 12.8 oz)   10/12/23 110.6 kg (243 lb 12.8 oz)                  Patient Active Problem List   Diagnosis    Hyperlipidemia LDL goal <160    NAYELY (generalized anxiety disorder)    Tobacco use disorder    BARRINGTON (obstructive sleep apnea)    Obesity (BMI 35.0-39.9) with comorbidity (H)    Unstable angina (H)    Percutaneous transluminal coronary angioplasty status     Past Surgical History:   Procedure Laterality Date    COLONOSCOPY N/A 1/4/2018    Procedure: COMBINED COLONOSCOPY, SINGLE OR MULTIPLE BIOPSY/POLYPECTOMY BY BIOPSY;;  Surgeon: Leeroy Jacobsen MD;  Location: [facility]    COLONOSCOPY WITH CO2 INSUFFLATION N/A 1/4/2018    Procedure: COLONOSCOPY WITH CO2 INSUFFLATION;  COLON-SCREENING / LIMON ;  Surgeon: Leeroy Jacobsen MD;  Location: [facility]    CV CORONARY ANGIOGRAM  11/5/2021    Procedure: CV CORONARY ANGIOGRAM;  Surgeon: Dalton Oliva MD;  Location: [facility]  CARDIAC CATH LAB    CV CORONARY LITHOTRIPSY PCI N/A 10/13/2021    Procedure: CV Coronary Lithotripsy PCI;  Surgeon: Miki Zaragoza MD;  Location: [facility] CARDIAC CATH LAB    CV HEART CATHETERIZATION WITH POSSIBLE INTERVENTION N/A 10/13/2021    Procedure: Heart Catheterization with Possible Intervention;  Surgeon: Miki Zaragoza MD;  Location: [facility] CARDIAC CATH LAB    CV INTRAVASULAR ULTRASOUND N/A 11/5/2021    Procedure: Intravascular Ultrasound;  Surgeon: Dalton Oliva MD;  Location: [facility] CARDIAC CATH LAB    CV PCI ANGIOPLASTY N/A 11/5/2021    Procedure: Percutaneous Transluminal Angioplasty;  Surgeon: Dalton Oliva MD;  Location: [facility] CARDIAC CATH LAB    CV PCI STENT DRUG ELUTING N/A 10/13/2021    Procedure: Percutaneous Coronary Intervention Stent Drug Eluting;  Surgeon: Miki Zaragoza MD;  Location: [facility] CARDIAC CATH LAB       Social History     Tobacco Use    Smoking status: Former     Current packs/day: 1.00     Types: Cigarettes    Smokeless tobacco: Current     Last attempt to quit: 9/1/2018   Substance Use Topics    Alcohol use: Yes     Comment: occ -      Family History   Problem Relation Age of Onset    Dementia Mother     Other Cancer Father         brain     Diabetes Brother          Current Outpatient Medications   Medication Sig Dispense Refill    amphetamine-dextroamphetamine (ADDERALL XR) 15 MG 24 hr capsule Take 1 capsule (15 mg) by mouth daily. 30 capsule 0    aspirin 81 MG EC tablet Take 1 tablet (81 mg) by mouth daily 90 tablet 3    atorvastatin (LIPITOR) 80 MG tablet Take 1 tablet (80 mg) by mouth daily. 90 tablet 1    losartan (COZAAR) 50 MG tablet Take 1 tablet (50 mg) by mouth daily 90 tablet 1    metoprolol succinate ER (TOPROL XL) 100 MG 24 hr tablet Take 1 tablet (100 mg) by mouth daily 90 tablet 3    nitroGLYcerin (NITROSTAT) 0.4 MG sublingual tablet For chest pain place 1 tablet under the tongue every 5  "minutes for 3 doses. If symptoms persist 5 minutes after 1st dose call 911. 25 tablet 11    order for DME Equipment being ordered: CPAP and all necessary supplies 1 Device 0    sildenafil (REVATIO) 20 MG tablet 2-5 tabs 1/2 to 4 hours prior to relations 30 tablet 11    trimethoprim-polymyxin b (POLYTRIM) 84456-4.1 UNIT/ML-% ophthalmic solution Place 1-2 drops into both eyes every 4 hours 10 mL 1    escitalopram (LEXAPRO) 10 MG tablet Take a 1/2 tab daily for 1 week, then increase to 1 full tab daily thereafter 45 tablet 1     No Known Allergies  Recent Labs   Lab Test 10/12/23  1051 10/06/22  1048 11/06/21  0611 11/05/21  2047 11/05/21  1213 10/13/21  1055 10/13/21  0828 05/01/19  1044 11/30/17  0941 11/14/16  0952   A1C  --   --   --   --   --  5.9*  --   --   --   --    LDL 74  --   --  90  --  69  --  119*   < > 157*   HDL 48  --   --  53  --  44  --  44   < > 42   TRIG 112  --   --  69  --  170*  --  254*   < > 155*   ALT  --   --   --   --  62 37  --   --   --   --    CR 0.95 0.94   < >  --  0.82 0.87   < > 0.91  --  1.00   GFRESTIMATED >90 >90   < >  --  >90 >90   < > >90  --  79   GFRESTBLACK  --   --   --   --   --   --   --  >90  --  >90  African American GFR Calc     POTASSIUM 5.0 3.8   < >  --  4.5 4.6   < > 4.7  --  4.2    < > = values in this interval not displayed.          Review of Systems  Constitutional, HEENT, cardiovascular, pulmonary, GI, , musculoskeletal, neuro, skin, endocrine and psych systems are negative, except as otherwise noted.     Objective    Exam  /62   Pulse 75   Temp 98  F (36.7  C) (Oral)   Resp 20   Ht 1.759 m (5' 9.25\")   Wt 105.3 kg (232 lb 3.2 oz)   SpO2 98%   BMI 34.04 kg/m     Estimated body mass index is 34.04 kg/m  as calculated from the following:    Height as of this encounter: 1.759 m (5' 9.25\").    Weight as of this encounter: 105.3 kg (232 lb 3.2 oz).    Physical Exam  GENERAL: alert and no distress  EYES: Eyes grossly normal to inspection, PERRL and " conjunctivae and sclerae normal  HENT: ear canals and TM's normal, nose and mouth without ulcers or lesions  NECK: no adenopathy, no asymmetry, masses, or scars  RESP: lungs clear to auscultation - no rales, rhonchi or wheezes  CV: regular rate and rhythm, normal S1 S2, no S3 or S4, no murmur, click or rub, no peripheral edema  ABDOMEN: soft, nontender, no hepatosplenomegaly, no masses and bowel sounds normal  MS: no gross musculoskeletal defects noted, no edema  SKIN: no suspicious lesions or rashes  NEURO: Normal strength and tone, mentation intact and speech normal  PSYCH: mentation appears normal, affect normal/bright    Harjit was seen today for physical and health maintenance.    Diagnoses and all orders for this visit:    Routine general medical examination at a health care facility    Benign essential hypertension  -     BASIC METABOLIC PANEL; Future    Tobacco use disorder  -     CT Chest Lung Cancer Screen Low Dose Without; Future    Screening for prostate cancer  -     PSA, screen; Future    Hyperlipidemia LDL goal <70  -     Lipid panel reflex to direct LDL Non-fasting; Future    Attention deficit hyperactivity disorder (ADHD), predominantly inattentive type    NAYELY (generalized anxiety disorder)  -     escitalopram (LEXAPRO) 10 MG tablet; Take a 1/2 tab daily for 1 week, then increase to 1 full tab daily thereafter    Other orders  -     REVIEW OF HEALTH MAINTENANCE PROTOCOL ORDERS      Continue all meds.  Trial of lexapro.   Lower fat, higher fiber diet and consistent exercise.  Recheck in 6 mos     Signed Electronically by: Piter Maynard PA-C

## 2024-10-30 LAB
ANION GAP SERPL CALCULATED.3IONS-SCNC: 15 MMOL/L (ref 7–15)
BUN SERPL-MCNC: 16.3 MG/DL (ref 8–23)
CALCIUM SERPL-MCNC: 9.2 MG/DL (ref 8.8–10.4)
CHLORIDE SERPL-SCNC: 99 MMOL/L (ref 98–107)
CHOLEST SERPL-MCNC: 141 MG/DL
CREAT SERPL-MCNC: 0.95 MG/DL (ref 0.67–1.17)
EGFRCR SERPLBLD CKD-EPI 2021: >90 ML/MIN/1.73M2
FASTING STATUS PATIENT QL REPORTED: YES
FASTING STATUS PATIENT QL REPORTED: YES
GLUCOSE SERPL-MCNC: 96 MG/DL (ref 70–99)
HCO3 SERPL-SCNC: 25 MMOL/L (ref 22–29)
HDLC SERPL-MCNC: 47 MG/DL
LDLC SERPL CALC-MCNC: 66 MG/DL
NONHDLC SERPL-MCNC: 94 MG/DL
POTASSIUM SERPL-SCNC: 4 MMOL/L (ref 3.4–5.3)
PSA SERPL DL<=0.01 NG/ML-MCNC: 0.98 NG/ML (ref 0–3.5)
SODIUM SERPL-SCNC: 139 MMOL/L (ref 135–145)
TRIGL SERPL-MCNC: 139 MG/DL

## 2024-11-19 ENCOUNTER — TELEPHONE (OUTPATIENT)
Dept: FAMILY MEDICINE | Facility: CLINIC | Age: 59
End: 2024-11-19
Payer: COMMERCIAL

## 2024-11-19 DIAGNOSIS — G47.33 OSA (OBSTRUCTIVE SLEEP APNEA): Primary | ICD-10-CM

## 2024-11-19 NOTE — TELEPHONE ENCOUNTER
Reason for Call:  Other     Detailed comments: Patient calling about getting a new CPAP machine needs new prescription for this needs to go to [facility].  Any questions please call patient     Phone Number Patient can be reached at: Home number on file [phone] (home)    Best Time: Any    Can we leave a detailed message on this number? YES    Call taken on 11/19/2024 at 2:44 PM by Sherlyn Gonzalez

## 2024-11-26 ENCOUNTER — MEDICAL CORRESPONDENCE (OUTPATIENT)
Dept: HEALTH INFORMATION MANAGEMENT | Facility: CLINIC | Age: 59
End: 2024-11-26
Payer: COMMERCIAL

## 2024-12-02 DIAGNOSIS — I10 BENIGN ESSENTIAL HYPERTENSION: ICD-10-CM

## 2024-12-02 RX ORDER — LOSARTAN POTASSIUM 50 MG/1
50 TABLET ORAL DAILY
Qty: 90 TABLET | Refills: 3 | Status: SHIPPED | OUTPATIENT
Start: 2024-12-02

## 2024-12-04 ENCOUNTER — OFFICE VISIT (OUTPATIENT)
Dept: FAMILY MEDICINE | Facility: CLINIC | Age: 59
End: 2024-12-04
Payer: COMMERCIAL

## 2024-12-04 VITALS
HEART RATE: 88 BPM | HEIGHT: 70 IN | WEIGHT: 242 LBS | DIASTOLIC BLOOD PRESSURE: 74 MMHG | TEMPERATURE: 98.2 F | BODY MASS INDEX: 34.65 KG/M2 | SYSTOLIC BLOOD PRESSURE: 118 MMHG | OXYGEN SATURATION: 97 % | RESPIRATION RATE: 20 BRPM

## 2024-12-04 DIAGNOSIS — G47.33 OSA (OBSTRUCTIVE SLEEP APNEA): Primary | ICD-10-CM

## 2024-12-04 PROCEDURE — 99212 OFFICE O/P EST SF 10 MIN: CPT | Performed by: PHYSICIAN ASSISTANT

## 2024-12-04 NOTE — PROGRESS NOTES
"  Subjective   Harjit is a 59 year old, presenting for the following health issues:  Sleep Problem      12/4/2024     8:18 AM   Additional Questions   Roomed by MP         12/4/2024     8:18 AM   Patient Reported Additional Medications   Patient reports taking the following new medications None per patient     HPI     Patient is here regarding his cpap  Per insurance he needs a visit.    Uses his cpap every noc. Tolerates it well and it works well to improve his sleep quality and snoring and fatigue.       Review of Systems  Constitutional, HEENT, cardiovascular, pulmonary, GI, , musculoskeletal, neuro, skin, endocrine and psych systems are negative, except as otherwise noted.      Objective    /74   Pulse 88   Temp 98.2  F (36.8  C) (Temporal)   Resp 20   Ht 1.78 m (5' 10.08\")   Wt 109.8 kg (242 lb)   SpO2 97%   BMI 34.65 kg/m    Body mass index is 34.65 kg/m .  Physical Exam   CHEST:chest clear to IPPA, no tachypnea, retractions or cyanosis, and S1, S2 normal, no murmur, no gallop, rate regular.  ENT exam reveals - ENT exam normal, no neck nodes or sinus tenderness.  Harjit was seen today for sleep problem.    Diagnoses and all orders for this visit:    BARRINGTON (obstructive sleep apnea)  -     CPAP Order for DME - ONLY FOR DME      work on lifestyle modification    Signed Electronically by: Piter Maynard PA-C    "

## 2024-12-11 ENCOUNTER — TELEPHONE (OUTPATIENT)
Dept: FAMILY MEDICINE | Facility: CLINIC | Age: 59
End: 2024-12-11
Payer: COMMERCIAL

## 2024-12-11 NOTE — TELEPHONE ENCOUNTER
Forms received from: [facility]    Phone number listed: [phone]   Fax listed: [phone]  Date received: 12/11/24  Form description: Detailed prescription medical supply  Once forms are completed, please return to na via fax.  Is patient requesting to be contacted when forms are completed: na  Phone:   Form placed: Provider serafin Starr

## 2024-12-12 ENCOUNTER — MEDICAL CORRESPONDENCE (OUTPATIENT)
Dept: HEALTH INFORMATION MANAGEMENT | Facility: CLINIC | Age: 59
End: 2024-12-12
Payer: COMMERCIAL

## 2024-12-12 DIAGNOSIS — F90.0 ATTENTION DEFICIT HYPERACTIVITY DISORDER (ADHD), PREDOMINANTLY INATTENTIVE TYPE: ICD-10-CM

## 2024-12-12 RX ORDER — DEXTROAMPHETAMINE SACCHARATE, AMPHETAMINE ASPARTATE MONOHYDRATE, DEXTROAMPHETAMINE SULFATE AND AMPHETAMINE SULFATE 3.75; 3.75; 3.75; 3.75 MG/1; MG/1; MG/1; MG/1
15 CAPSULE, EXTENDED RELEASE ORAL DAILY
Qty: 30 CAPSULE | Refills: 0 | Status: SHIPPED | OUTPATIENT
Start: 2024-12-12 | End: 2025-01-10

## 2024-12-18 ENCOUNTER — TELEPHONE (OUTPATIENT)
Dept: FAMILY MEDICINE | Facility: CLINIC | Age: 59
End: 2024-12-18
Payer: COMMERCIAL

## 2024-12-18 NOTE — TELEPHONE ENCOUNTER
Routing to Piter Maynard    Visit note from 12/4 visit needs to be addended  To indicate patient needs new C-pap machine.    It needs st state machine broken? Over 5 years?     Reason a new machine is needed.    When done route back to team so it can be faxed to [facility] in [place].      RN received call from Hien with [facility] regarding above.    [facility] also faxed request.    Fuentes Lindquist RN, BSN, PHN  [facility]

## 2024-12-18 NOTE — TELEPHONE ENCOUNTER
Patient calling- very upset about this going back and fourth about this. Can we get a written letter indicating patient needs this. Patient has been trying to get this supply sense November    Carolina Starr  [facility],  [place]

## 2024-12-19 NOTE — TELEPHONE ENCOUNTER
Faxed updated information.  Carolina CARRASCO [facility],  [place]

## 2025-01-10 DIAGNOSIS — F90.0 ATTENTION DEFICIT HYPERACTIVITY DISORDER (ADHD), PREDOMINANTLY INATTENTIVE TYPE: ICD-10-CM

## 2025-01-10 NOTE — TELEPHONE ENCOUNTER
Pt calling for monthly refill of controlled medication.     Felipa Clemens RN on 1/10/2025 at 3:40 PM

## 2025-01-13 RX ORDER — DEXTROAMPHETAMINE SACCHARATE, AMPHETAMINE ASPARTATE MONOHYDRATE, DEXTROAMPHETAMINE SULFATE AND AMPHETAMINE SULFATE 3.75; 3.75; 3.75; 3.75 MG/1; MG/1; MG/1; MG/1
15 CAPSULE, EXTENDED RELEASE ORAL DAILY
Qty: 30 CAPSULE | Refills: 0 | Status: SHIPPED | OUTPATIENT
Start: 2025-01-13

## 2025-02-11 DIAGNOSIS — F90.0 ATTENTION DEFICIT HYPERACTIVITY DISORDER (ADHD), PREDOMINANTLY INATTENTIVE TYPE: ICD-10-CM

## 2025-02-11 NOTE — TELEPHONE ENCOUNTER
.Reason for Call:  Medication or medication refill:    Do you use a [facility]?  Name of the pharmacy and phone number for the current request:    [id number] [place], [place] - [address]       Name of the medication requested: amphetamine-dextroamphetamine (ADDERALL XR) 15 MG 24 hr capsule     Other request: NA    Can we leave a detailed message on this number? YES    Phone number patient can be reached at: Home number on file [phone] (home)    Best Time: Any    Call taken on 2/11/2025 at 9:51 AM by Mishel Cruz

## 2025-02-13 RX ORDER — DEXTROAMPHETAMINE SACCHARATE, AMPHETAMINE ASPARTATE MONOHYDRATE, DEXTROAMPHETAMINE SULFATE AND AMPHETAMINE SULFATE 3.75; 3.75; 3.75; 3.75 MG/1; MG/1; MG/1; MG/1
15 CAPSULE, EXTENDED RELEASE ORAL DAILY
Qty: 30 CAPSULE | Refills: 0 | Status: SHIPPED | OUTPATIENT
Start: 2025-02-13 | End: 2025-03-19

## 2025-02-26 DIAGNOSIS — I25.10 CORONARY ARTERY CALCIFICATION SEEN ON CAT SCAN: ICD-10-CM

## 2025-02-26 RX ORDER — ATORVASTATIN CALCIUM 80 MG/1
80 TABLET, FILM COATED ORAL DAILY
Qty: 90 TABLET | Refills: 2 | Status: SHIPPED | OUTPATIENT
Start: 2025-02-26

## 2025-03-19 DIAGNOSIS — F90.0 ATTENTION DEFICIT HYPERACTIVITY DISORDER (ADHD), PREDOMINANTLY INATTENTIVE TYPE: ICD-10-CM

## 2025-03-21 RX ORDER — DEXTROAMPHETAMINE SACCHARATE, AMPHETAMINE ASPARTATE MONOHYDRATE, DEXTROAMPHETAMINE SULFATE AND AMPHETAMINE SULFATE 3.75; 3.75; 3.75; 3.75 MG/1; MG/1; MG/1; MG/1
15 CAPSULE, EXTENDED RELEASE ORAL DAILY
Qty: 30 CAPSULE | Refills: 0 | Status: SHIPPED | OUTPATIENT
Start: 2025-03-21 | End: 2025-04-21

## 2025-05-27 ENCOUNTER — OFFICE VISIT (OUTPATIENT)
Dept: FAMILY MEDICINE | Facility: CLINIC | Age: 60
End: 2025-05-27
Payer: COMMERCIAL

## 2025-05-27 VITALS
WEIGHT: 244.8 LBS | OXYGEN SATURATION: 98 % | HEART RATE: 70 BPM | DIASTOLIC BLOOD PRESSURE: 83 MMHG | BODY MASS INDEX: 36.26 KG/M2 | SYSTOLIC BLOOD PRESSURE: 135 MMHG | HEIGHT: 69 IN | TEMPERATURE: 98 F | RESPIRATION RATE: 20 BRPM

## 2025-05-27 DIAGNOSIS — F41.1 GAD (GENERALIZED ANXIETY DISORDER): ICD-10-CM

## 2025-05-27 DIAGNOSIS — Z98.61 PERCUTANEOUS TRANSLUMINAL CORONARY ANGIOPLASTY STATUS: ICD-10-CM

## 2025-05-27 DIAGNOSIS — F90.0 ATTENTION DEFICIT HYPERACTIVITY DISORDER (ADHD), PREDOMINANTLY INATTENTIVE TYPE: ICD-10-CM

## 2025-05-27 DIAGNOSIS — I10 BENIGN ESSENTIAL HYPERTENSION: Primary | ICD-10-CM

## 2025-05-27 DIAGNOSIS — I25.10 CORONARY ARTERY CALCIFICATION SEEN ON CAT SCAN: ICD-10-CM

## 2025-05-27 PROCEDURE — 3075F SYST BP GE 130 - 139MM HG: CPT | Performed by: PHYSICIAN ASSISTANT

## 2025-05-27 PROCEDURE — 3079F DIAST BP 80-89 MM HG: CPT | Performed by: PHYSICIAN ASSISTANT

## 2025-05-27 PROCEDURE — 99213 OFFICE O/P EST LOW 20 MIN: CPT | Performed by: PHYSICIAN ASSISTANT

## 2025-05-27 RX ORDER — METOPROLOL SUCCINATE 100 MG/1
100 TABLET, EXTENDED RELEASE ORAL
Qty: 90 TABLET | Refills: 3 | Status: SHIPPED | OUTPATIENT
Start: 2025-05-27

## 2025-05-27 RX ORDER — ESCITALOPRAM OXALATE 20 MG/1
20 TABLET ORAL DAILY
Qty: 90 TABLET | Refills: 1 | Status: SHIPPED | OUTPATIENT
Start: 2025-05-27

## 2025-05-27 RX ORDER — ATORVASTATIN CALCIUM 80 MG/1
80 TABLET, FILM COATED ORAL DAILY
Qty: 90 TABLET | Refills: 3 | Status: SHIPPED | OUTPATIENT
Start: 2025-05-27

## 2025-05-27 RX ORDER — LOSARTAN POTASSIUM 50 MG/1
50 TABLET ORAL DAILY
Qty: 90 TABLET | Refills: 1 | Status: SHIPPED | OUTPATIENT
Start: 2025-05-27

## 2025-05-27 NOTE — PROGRESS NOTES
"    Subjective   Harjit is a 59 year old, presenting for the following health issues:  Recheck Medication and Health Maintenance (Patient declines vaccines today)        5/27/2025     9:43 AM   Additional Questions   Roomed by Joanna Roy CMA   Accompanied by None         5/27/2025     9:43 AM   Patient Reported Additional Medications   Patient reports taking the following new medications none     History of Present Illness       Reason for visit:  Follow    He eats 0-1 servings of fruits and vegetables daily.He consumes 4 sweetened beverage(s) daily.He exercises with enough effort to increase his heart rate 20 to 29 minutes per day.  He exercises with enough effort to increase his heart rate 4 days per week.   He is taking medications regularly.        No chest pain/sob/palpitations/dizziness/ha's    Anxiety still a bit of an issue. No depression. No anhedonia.     Taking and tolerating meds.   Adhd is doing well.   Review of Systems  Constitutional, HEENT, cardiovascular, pulmonary, GI, , musculoskeletal, neuro, skin, endocrine and psych systems are negative, except as otherwise noted.      Objective    /83   Pulse 70   Temp 98  F (36.7  C) (Oral)   Resp 20   Ht 1.753 m (5' 9\")   Wt 111 kg (244 lb 12.8 oz)   SpO2 98%   BMI 36.15 kg/m    Body mass index is 36.15 kg/m .  Physical Exam   Eye exam - right eye normal lid, conjunctiva, cornea, pupil and fundus, left eye normal lid, conjunctiva, cornea, pupil and fundus.  Thyroid not palpable, not enlarged, no nodules detected.  CHEST:chest clear to IPPA, no tachypnea, retractions or cyanosis, and S1, S2 normal, no murmur, no gallop, rate regular.  Harjit was seen today for recheck medication and health maintenance.    Diagnoses and all orders for this visit:    Benign essential hypertension  -     losartan (COZAAR) 50 MG tablet; Take 1 tablet (50 mg) by mouth daily.    Percutaneous transluminal coronary angioplasty status  -     metoprolol succinate ER " (TOPROL XL) 100 MG 24 hr tablet; Take 1 tablet (100 mg) by mouth daily at 2 pm.    NAYELY (generalized anxiety disorder)  -     escitalopram (LEXAPRO) 20 MG tablet; Take 1 tablet (20 mg) by mouth daily.    Attention deficit hyperactivity disorder (ADHD), predominantly inattentive type    Coronary artery calcification seen on CAT scan  -     atorvastatin (LIPITOR) 80 MG tablet; Take 1 tablet (80 mg) by mouth daily.    Other orders  -     REVIEW OF HEALTH MAINTENANCE PROTOCOL ORDERS    Continue all meds. Recheck in 6 mos   Lower sodium diet.  Lower fat, higher fiber diet and consistent exercise.            Signed Electronically by: Piter Maynard PA-C

## 2025-06-19 DIAGNOSIS — F90.0 ATTENTION DEFICIT HYPERACTIVITY DISORDER (ADHD), PREDOMINANTLY INATTENTIVE TYPE: ICD-10-CM

## 2025-06-19 DIAGNOSIS — N52.8 OTHER MALE ERECTILE DYSFUNCTION: ICD-10-CM

## 2025-06-20 RX ORDER — DEXTROAMPHETAMINE SACCHARATE, AMPHETAMINE ASPARTATE MONOHYDRATE, DEXTROAMPHETAMINE SULFATE AND AMPHETAMINE SULFATE 3.75; 3.75; 3.75; 3.75 MG/1; MG/1; MG/1; MG/1
15 CAPSULE, EXTENDED RELEASE ORAL DAILY
Qty: 30 CAPSULE | Refills: 0 | Status: SHIPPED | OUTPATIENT
Start: 2025-06-20 | End: 2025-07-31

## 2025-06-20 RX ORDER — SILDENAFIL CITRATE 20 MG/1
TABLET ORAL
Qty: 30 TABLET | Refills: 11 | Status: SHIPPED | OUTPATIENT
Start: 2025-06-20

## (undated) DEVICE — GUIDEWIRE VASC 0.014INX180CM RUNTHROUGH 25-1011

## (undated) DEVICE — STATLOCK PSI DEVICE

## (undated) DEVICE — Device

## (undated) DEVICE — INTRO GLIDESHEATH SLENDER 6FR 10X45CM 60-1060

## (undated) DEVICE — SLEEVE TR BAND RADIAL COMPRESSION DEVICE 24CM TRB24-REG

## (undated) DEVICE — CATH GUIDELINER 6FR 5571

## (undated) DEVICE — CATH BALLOON EMERGE 2.5X20MM H7493918920250

## (undated) DEVICE — PACK HEART LEFT CUSTOM

## (undated) DEVICE — KIT ACCESSORY INTRO INFLATION SYS 20/30 PRIORITY 1000186-115

## (undated) DEVICE — SHEATH GUIDING R2P DESTINATION 75CM 6FR STERIL GS-R6ST1C75W

## (undated) DEVICE — CATH GUIDING BLUE YELLOW PTFE XB3 6FRX100CM 67005200

## (undated) DEVICE — TUBING PRESSURE 30"

## (undated) DEVICE — CATH BALLOON EMERGE 2.5X30MM H7493918930250

## (undated) DEVICE — KIT HAND CONTROL ACIST 014644 AR-P54

## (undated) DEVICE — MANIFOLD KIT ANGIO AUTOMATED 014613

## (undated) DEVICE — GW VASC .035IN DIA 260CML 7CML 3 MM RADIUS J CURVE 502455

## (undated) DEVICE — CATH BALLOON EUPHORA RX 2.5X15MM EUP2515X

## (undated) DEVICE — CATH GUIDING BLUE YELLOW PTFE AL1 6FRX100CM 67003600

## (undated) DEVICE — SHTH INTRO 0.021IN ID 6FR DIA

## (undated) DEVICE — CATH BALLOON NC EMERGE 3.50X8MM H7493926708350

## (undated) DEVICE — CATH GUIDING BLUE YELLOW PTFE XB3.5 6FRX100CM 67005400

## (undated) DEVICE — CATH CORONARY LITHOTRIPSY SHOCKWAVE 3.0X12MM C2IVL3012

## (undated) DEVICE — SLEEVE TR BAND RADIAL COMPRESSION DEVICE 29CM XX-RF06L

## (undated) DEVICE — CATH BALLOON NC EMERGE 3.25X20MM H7493926720320

## (undated) DEVICE — CATH GUIDING BLUE YELLOW PTFE JR4 6FRX100CM 67008200

## (undated) DEVICE — CATH BALLOON EMERGE OTW 1.5X20MM H7493919020150

## (undated) DEVICE — CATH ANGIO 6FR JL3.5 100CM ST+

## (undated) DEVICE — CATH BALLOON NC EMERGE 3.75X20MM H7493926720370

## (undated) DEVICE — SOL WATER IRRIG 1000ML BOTTLE 07139-09

## (undated) DEVICE — CATH US OD 5FR OD SEC 2.9FR EAGLE EYE PLATINUM 0.014 85900P

## (undated) DEVICE — CATH GUIDING BLUE YELLOW PTFE JL4 6FRX100CM 67000400

## (undated) DEVICE — VALVE HEMOSTASIS .096" COPILOT MECH 1003331

## (undated) DEVICE — GW MINAMO STRAIGHT 190CM

## (undated) DEVICE — CATH BALLOON NC EMERGE 3.00X20MM H7493926720300

## (undated) DEVICE — CATH BALLOON EUPHORA RX 2.0X15MM EUP2015X

## (undated) DEVICE — INTRO SHEATH AVANTI 4FRX23CM 504604T

## (undated) RX ORDER — PRASUGREL 10 MG/1
TABLET, FILM COATED ORAL
Status: DISPENSED
Start: 2021-11-05

## (undated) RX ORDER — HEPARIN SODIUM 1000 [USP'U]/ML
INJECTION, SOLUTION INTRAVENOUS; SUBCUTANEOUS
Status: DISPENSED
Start: 2021-10-13

## (undated) RX ORDER — SIMETHICONE 40MG/0.6ML
SUSPENSION, DROPS(FINAL DOSAGE FORM)(ML) ORAL
Status: DISPENSED
Start: 2018-01-04

## (undated) RX ORDER — METOPROLOL TARTRATE 50 MG
TABLET ORAL
Status: DISPENSED
Start: 2021-10-13

## (undated) RX ORDER — HYDRALAZINE HYDROCHLORIDE 20 MG/ML
INJECTION INTRAMUSCULAR; INTRAVENOUS
Status: DISPENSED
Start: 2021-10-13

## (undated) RX ORDER — HYDRALAZINE HYDROCHLORIDE 20 MG/ML
INJECTION INTRAMUSCULAR; INTRAVENOUS
Status: DISPENSED
Start: 2021-11-05

## (undated) RX ORDER — FENTANYL CITRATE 50 UG/ML
INJECTION, SOLUTION INTRAMUSCULAR; INTRAVENOUS
Status: DISPENSED
Start: 2021-11-05

## (undated) RX ORDER — NICARDIPINE HCL-0.9% SOD CHLOR 1 MG/10 ML
SYRINGE (ML) INTRAVENOUS
Status: DISPENSED
Start: 2021-11-05

## (undated) RX ORDER — NITROGLYCERIN 0.4 MG/1
TABLET SUBLINGUAL
Status: DISPENSED
Start: 2021-10-13

## (undated) RX ORDER — FENTANYL CITRATE 50 UG/ML
INJECTION, SOLUTION INTRAMUSCULAR; INTRAVENOUS
Status: DISPENSED
Start: 2021-10-13

## (undated) RX ORDER — NITROGLYCERIN 5 MG/ML
VIAL (ML) INTRAVENOUS
Status: DISPENSED
Start: 2021-10-13

## (undated) RX ORDER — FENTANYL CITRATE 50 UG/ML
INJECTION, SOLUTION INTRAMUSCULAR; INTRAVENOUS
Status: DISPENSED
Start: 2018-01-04

## (undated) RX ORDER — ACETAMINOPHEN 325 MG/1
TABLET ORAL
Status: DISPENSED
Start: 2021-11-05

## (undated) RX ORDER — NICARDIPINE HCL-0.9% SOD CHLOR 1 MG/10 ML
SYRINGE (ML) INTRAVENOUS
Status: DISPENSED
Start: 2021-10-13

## (undated) RX ORDER — NITROGLYCERIN 5 MG/ML
VIAL (ML) INTRAVENOUS
Status: DISPENSED
Start: 2021-11-05

## (undated) RX ORDER — HEPARIN SODIUM 1000 [USP'U]/ML
INJECTION, SOLUTION INTRAVENOUS; SUBCUTANEOUS
Status: DISPENSED
Start: 2021-11-05